# Patient Record
Sex: MALE | Race: WHITE | NOT HISPANIC OR LATINO | Employment: OTHER | URBAN - METROPOLITAN AREA
[De-identification: names, ages, dates, MRNs, and addresses within clinical notes are randomized per-mention and may not be internally consistent; named-entity substitution may affect disease eponyms.]

---

## 2021-01-20 DIAGNOSIS — Z23 ENCOUNTER FOR IMMUNIZATION: ICD-10-CM

## 2021-02-08 ENCOUNTER — IMMUNIZATIONS (OUTPATIENT)
Dept: FAMILY MEDICINE CLINIC | Facility: HOSPITAL | Age: 81
End: 2021-02-08

## 2021-02-08 DIAGNOSIS — Z23 ENCOUNTER FOR IMMUNIZATION: Primary | ICD-10-CM

## 2021-02-08 PROCEDURE — 0011A SARS-COV-2 / COVID-19 MRNA VACCINE (MODERNA) 100 MCG: CPT

## 2021-02-08 PROCEDURE — 91301 SARS-COV-2 / COVID-19 MRNA VACCINE (MODERNA) 100 MCG: CPT

## 2021-03-09 ENCOUNTER — IMMUNIZATIONS (OUTPATIENT)
Dept: FAMILY MEDICINE CLINIC | Facility: HOSPITAL | Age: 81
End: 2021-03-09

## 2021-03-09 DIAGNOSIS — Z23 ENCOUNTER FOR IMMUNIZATION: Primary | ICD-10-CM

## 2021-03-09 PROCEDURE — 0012A SARS-COV-2 / COVID-19 MRNA VACCINE (MODERNA) 100 MCG: CPT

## 2021-03-09 PROCEDURE — 91301 SARS-COV-2 / COVID-19 MRNA VACCINE (MODERNA) 100 MCG: CPT

## 2021-07-20 ENCOUNTER — HOSPITAL ENCOUNTER (EMERGENCY)
Facility: HOSPITAL | Age: 81
Discharge: HOME | End: 2021-07-20
Attending: EMERGENCY MEDICINE | Admitting: EMERGENCY MEDICINE
Payer: COMMERCIAL

## 2021-07-20 VITALS
TEMPERATURE: 97.1 F | WEIGHT: 175 LBS | HEART RATE: 83 BPM | BODY MASS INDEX: 23.7 KG/M2 | DIASTOLIC BLOOD PRESSURE: 86 MMHG | OXYGEN SATURATION: 97 % | HEIGHT: 72 IN | RESPIRATION RATE: 18 BRPM | SYSTOLIC BLOOD PRESSURE: 186 MMHG

## 2021-07-20 DIAGNOSIS — R21 RASH: Primary | ICD-10-CM

## 2021-07-20 DIAGNOSIS — R20.0 NUMBNESS OF TOES: ICD-10-CM

## 2021-07-20 LAB
GLUCOSE SERPL-MCNC: 91 MG/DL (ref 65–140)
VIT B12 SERPL-MCNC: 109 PG/ML (ref 100–900)

## 2021-07-20 PROCEDURE — 99282 EMERGENCY DEPT VISIT SF MDM: CPT | Performed by: PHYSICIAN ASSISTANT

## 2021-07-20 PROCEDURE — 82948 REAGENT STRIP/BLOOD GLUCOSE: CPT

## 2021-07-20 PROCEDURE — 36415 COLL VENOUS BLD VENIPUNCTURE: CPT | Performed by: PHYSICIAN ASSISTANT

## 2021-07-20 PROCEDURE — 82607 VITAMIN B-12: CPT | Performed by: PHYSICIAN ASSISTANT

## 2021-07-20 PROCEDURE — 83918 ORGANIC ACIDS TOTAL QUANT: CPT | Performed by: PHYSICIAN ASSISTANT

## 2021-07-20 PROCEDURE — 86617 LYME DISEASE ANTIBODY: CPT | Performed by: PHYSICIAN ASSISTANT

## 2021-07-20 PROCEDURE — 86618 LYME DISEASE ANTIBODY: CPT | Performed by: PHYSICIAN ASSISTANT

## 2021-07-20 PROCEDURE — 99283 EMERGENCY DEPT VISIT LOW MDM: CPT

## 2021-07-20 RX ORDER — TAMSULOSIN HYDROCHLORIDE 0.4 MG/1
0.4 CAPSULE ORAL
COMMUNITY

## 2021-07-20 NOTE — ED PROVIDER NOTES
History  Chief Complaint   Patient presents with    Rash     pt reports a rash on his legs and arms that he noticed about 3 weeks ago  denies any irritation or itchiness  states that he thinks he might have lyme's disease, as he found a tick on him about a month ago  denies any other symptoms  78 yo male w/ hx of HTN and PPM presents to the Emergency Department for evaluation of "rash" to the legs and arms intermittently x 3 weeks  Had removed a tick from his body prior to onset of symptoms, states he thought it was a skin tag and left it on for >2 days  No fevers, chills, sweats, N/V/D  He also reports ongoing toe and finger paresthesias  Prior to Admission Medications   Prescriptions Last Dose Informant Patient Reported? Taking?   tamsulosin (Flomax) 0 4 mg   Yes Yes   Sig: Take 0 4 mg by mouth daily with dinner      Facility-Administered Medications: None       Past Medical History:   Diagnosis Date    Hypertension        Past Surgical History:   Procedure Laterality Date    CARDIAC PACEMAKER PLACEMENT         History reviewed  No pertinent family history  I have reviewed and agree with the history as documented  E-Cigarette/Vaping     E-Cigarette/Vaping Substances     Social History     Tobacco Use    Smoking status: Never Smoker    Smokeless tobacco: Never Used   Substance Use Topics    Alcohol use: Yes     Comment: socially    Drug use: Never       Review of Systems   Constitutional: Negative for chills, diaphoresis and fever  Eyes: Negative for visual disturbance  Respiratory: Negative for cough and shortness of breath  Cardiovascular: Negative for chest pain and palpitations  Gastrointestinal: Negative for abdominal pain, diarrhea, nausea and vomiting  Genitourinary: Negative for dysuria, flank pain and frequency  Musculoskeletal: Negative for arthralgias and myalgias  Skin: Positive for color change  Negative for rash and wound     Allergic/Immunologic: Negative for immunocompromised state  Neurological: Positive for numbness  Negative for dizziness and light-headedness  Hematological: Does not bruise/bleed easily  Psychiatric/Behavioral: Negative for confusion  The patient is not nervous/anxious  Physical Exam  Physical Exam  Vitals and nursing note reviewed  Constitutional:       Appearance: He is well-developed  HENT:      Head: Normocephalic and atraumatic  Mouth/Throat:      Mouth: Mucous membranes are moist    Eyes:      Conjunctiva/sclera: Conjunctivae normal    Cardiovascular:      Rate and Rhythm: Normal rate and regular rhythm  Heart sounds: No murmur heard  Pulmonary:      Effort: Pulmonary effort is normal  No respiratory distress  Breath sounds: Normal breath sounds  Abdominal:      Palpations: Abdomen is soft  Tenderness: There is no abdominal tenderness  Musculoskeletal:      Cervical back: Neck supple  Skin:     General: Skin is warm and dry  Capillary Refill: Capillary refill takes less than 2 seconds  Comments: No obvious skin discolorations, lesions or rashes   Neurological:      General: No focal deficit present  Mental Status: He is alert        Comments: Normal digital sensation throughout   Psychiatric:         Mood and Affect: Mood normal          Behavior: Behavior normal          Vital Signs  ED Triage Vitals   Temperature Pulse Respirations Blood Pressure SpO2   07/20/21 1120 07/20/21 1121 07/20/21 1121 07/20/21 1121 07/20/21 1121   (!) 97 1 °F (36 2 °C) 83 18 (!) 186/86 97 %      Temp Source Heart Rate Source Patient Position - Orthostatic VS BP Location FiO2 (%)   07/20/21 1120 07/20/21 1121 -- -- --   Temporal Monitor         Pain Score       --                  Vitals:    07/20/21 1121   BP: (!) 186/86   Pulse: 83         Visual Acuity      ED Medications  Medications - No data to display    Diagnostic Studies  Results Reviewed     Procedure Component Value Units Date/Time Methylmalonic acid, serum [[id number]] Collected: 07/20/21 1208    Lab Status: In process Specimen: Blood from Arm, Right Updated: 07/20/21 1212    Lyme Antibody Profile with reflex to [facility] [[id number]] Collected: 07/20/21 1208    Lab Status: In process Specimen: Blood from Arm, Right Updated: 07/20/21 1212    Vitamin B12 [[id number]] Collected: 07/20/21 1208    Lab Status: In process Specimen: Blood from Arm, Right Updated: 07/20/21 1212    Fingerstick Glucose (POCT) [[id number]]  (Normal) Collected: 07/20/21 1200    Lab Status: Final result Updated: 07/20/21 1201     POC Glucose 91 mg/dl                  No orders to display              Procedures  Procedures         ED Course                                           MDM  Number of Diagnoses or Management Options  Numbness of toes: new and requires workup  Rash: new and requires workup  Diagnosis management comments: Will send lyme titer given preceding tick bite  Also send for B12/MMA given digital paresthesia, advised pt to f/u with PCP for these results       Amount and/or Complexity of Data Reviewed  Tests in the medicine section of CPT®: ordered and reviewed  Review and summarize past medical records: yes        Disposition  Final diagnoses:   Rash   Numbness of toes     Time reflects when diagnosis was documented in both MDM as applicable and the Disposition within this note     Time User Action Codes Description Comment    7/20/2021 11:46 AM Maria Elena Leos Add [R21] Rash     7/20/2021 11:48 AM Maria Elena Leos Add [R20 0] Numbness of toes       ED Disposition     ED Disposition Condition Date/Time Comment    Discharge Stable Tue Jul 20, 2021 11:46 AM Veronica Gut discharge to home/self care              Follow-up Information     Follow up With Specialties Details Why Contact Info    Everette Gant MD Family Medicine   [address]  [phone]            Discharge Medication List as of 7/20/2021 11:48 AM      CONTINUE these medications which have NOT CHANGED    Details   tamsulosin (Flomax) 0 4 mg Take 0 4 mg by mouth daily with dinner, Historical Med           No discharge procedures on file      PDMP Review     None          ED Provider  Electronically Signed by           Shahnaz Schroeder PA-C  07/20/21 4121

## 2021-07-22 LAB — B BURGDOR IGG+IGM SER-ACNC: 539

## 2021-07-23 LAB
B BURGDOR IGG PATRN SER IB-IMP: POSITIVE
B BURGDOR IGM PATRN SER IB-IMP: NEGATIVE
B BURGDOR18KD IGG SER QL IB: PRESENT
B BURGDOR23KD IGG SER QL IB: PRESENT
B BURGDOR23KD IGM SER QL IB: PRESENT
B BURGDOR28KD IGG SER QL IB: PRESENT
B BURGDOR30KD IGG SER QL IB: PRESENT
B BURGDOR39KD IGG SER QL IB: PRESENT
B BURGDOR39KD IGM SER QL IB: ABNORMAL
B BURGDOR41KD IGG SER QL IB: PRESENT
B BURGDOR41KD IGM SER QL IB: ABNORMAL
B BURGDOR45KD IGG SER QL IB: ABNORMAL
B BURGDOR58KD IGG SER QL IB: PRESENT
B BURGDOR66KD IGG SER QL IB: PRESENT
B BURGDOR93KD IGG SER QL IB: PRESENT
METHYLMALONATE SERPL-SCNC: 315 NMOL/L (ref 0–378)
SL AMB DISCLAIMER: NORMAL

## 2021-07-26 ENCOUNTER — TELEPHONE (OUTPATIENT)
Dept: EMERGENCY DEPT | Facility: HOSPITAL | Age: 81
End: 2021-07-26

## 2021-07-26 DIAGNOSIS — A69.20 LYME DISEASE: Primary | ICD-10-CM

## 2021-07-26 RX ORDER — DOXYCYCLINE HYCLATE 100 MG/1
100 CAPSULE ORAL 2 TIMES DAILY
Qty: 42 CAPSULE | Refills: 0 | Status: SHIPPED | OUTPATIENT
Start: 2021-07-26 | End: 2021-08-16

## 2023-05-04 ENCOUNTER — OFFICE VISIT (OUTPATIENT)
Dept: CARDIOLOGY CLINIC | Facility: CLINIC | Age: 83
End: 2023-05-04

## 2023-05-04 VITALS
HEART RATE: 64 BPM | SYSTOLIC BLOOD PRESSURE: 142 MMHG | BODY MASS INDEX: 24.42 KG/M2 | WEIGHT: 174.4 LBS | HEIGHT: 71 IN | DIASTOLIC BLOOD PRESSURE: 60 MMHG

## 2023-05-04 DIAGNOSIS — I49.5 SICK SINUS SYNDROME (HCC): ICD-10-CM

## 2023-05-04 DIAGNOSIS — I10 PRIMARY HYPERTENSION: ICD-10-CM

## 2023-05-04 DIAGNOSIS — I48.91 ATRIAL FIBRILLATION, UNSPECIFIED TYPE (HCC): Primary | ICD-10-CM

## 2023-05-04 DIAGNOSIS — I71.21 ANEURYSM OF ASCENDING AORTA WITHOUT RUPTURE (HCC): ICD-10-CM

## 2023-05-04 PROBLEM — Z95.0 PACEMAKER: Status: ACTIVE | Noted: 2023-05-04

## 2023-05-04 RX ORDER — APIXABAN 5 MG/1
5 TABLET, FILM COATED ORAL 2 TIMES DAILY
COMMUNITY
Start: 2023-04-21 | End: 2023-05-04

## 2023-05-04 RX ORDER — AMLODIPINE BESYLATE 10 MG/1
10 TABLET ORAL DAILY
Qty: 90 TABLET | Refills: 3 | Status: SHIPPED | OUTPATIENT
Start: 2023-05-04

## 2023-05-04 RX ORDER — METOPROLOL SUCCINATE 50 MG/1
50 TABLET, EXTENDED RELEASE ORAL DAILY
Qty: 90 TABLET | Refills: 3 | Status: SHIPPED | OUTPATIENT
Start: 2023-05-04

## 2023-05-04 RX ORDER — LISINOPRIL 40 MG/1
TABLET ORAL
COMMUNITY
End: 2023-05-04 | Stop reason: SDUPTHER

## 2023-05-04 RX ORDER — LISINOPRIL 20 MG/1
20 TABLET ORAL DAILY
Qty: 90 TABLET | Refills: 3 | Status: SHIPPED | OUTPATIENT
Start: 2023-05-04

## 2023-05-04 RX ORDER — AMLODIPINE BESYLATE 10 MG/1
10 TABLET ORAL DAILY
COMMUNITY
Start: 2023-04-13 | End: 2023-05-04 | Stop reason: SDUPTHER

## 2023-05-04 NOTE — PROGRESS NOTES
[address] Cardiology  Office Consultation  Willard Whatley 81 y o  male MRN: [id number]        Chief Complaint    Chief Complaint   Patient presents with    New Patient Visit     Est  Care     Edema     Right foot-mild       Referring Provider: Self, Referral    Impression & Plan:    1  Atrial fibrillation, unspecified type [facility])  He is presently atrial paced  Continue metoprolol XL 50 mg daily  Continue apixaban 5 mg twice daily for thromboembolic prophylaxis  - POCT ECG  - apixaban (Eliquis) 5 mg; Take 1 tablet (5 mg total) by mouth 2 (two) times a day  Dispense: 90 tablet; Refill: 3  - metoprolol succinate (TOPROL-XL) 50 mg 24 hr tablet; Take 1 tablet (50 mg total) by mouth daily  Dispense: 90 tablet; Refill: 3  - Lipid Panel with Direct LDL reflex; Future    2  Aneurysm of ascending aorta without rupture (HCC)  We will continue to monitor with a noncontrast CT after next visit  3  Primary hypertension  Controlled on current therapy  Continue amlodipine 10 mg daily and metoprolol XL 50 mg daily and lisinopril 20 mg daily  - lisinopril (ZESTRIL) 20 mg tablet; Take 1 tablet (20 mg total) by mouth daily  Dispense: 90 tablet; Refill: 3  - amLODIPine (NORVASC) 10 mg tablet; Take 1 tablet (10 mg total) by mouth daily  Dispense: 90 tablet; Refill: 3  - metoprolol succinate (TOPROL-XL) 50 mg 24 hr tablet; Take 1 tablet (50 mg total) by mouth daily  Dispense: 90 tablet; Refill: 3  - Lipid Panel with Direct LDL reflex; Future    4  Sick sinus syndrome ([address] )  With pacemaker in place  We will enroll him in our device clinic  We will see Willard Whatley back in 12 months for routine follow-up  HPI: Willard Whatley is a 81y o  year old male with PAF, SSS s/p PPM, HTN, presenting today to establish care with cardiology locally  He was previously seen by [facility] but no longer wants to make the commute  He has no cardiac complaints         Cardiac testing:     He had an echocardiogram at [facility] 10/14/2022 which showed normal LV size, wall thickness, systolic function  LVEF 60 to 65%  Indeterminate diastolic function  Mild RV dilation with normal systolic function  Moderately dilated left atrium  No significant valvular disease  Mild dilation of the aortic root and ascending aorta (4 0 cm and 4 1 cm, respectively)      EKG reviewed personally:   EKG in the office 5/4/2023 shows atrial paced rhythm, 64 bpm, normal axis,  ms and incomplete LBBB, nonspecific ST changes  Abnormal study  Relevant Laboratory Studies:  (Laboratory studies personally reviewed)      Review of Systems   Constitutional: Negative for activity change and unexpected weight change  HENT: Negative for facial swelling  Eyes: Negative for visual disturbance  Respiratory: Negative for cough and chest tightness  Cardiovascular: Negative for chest pain  Gastrointestinal: Negative for blood in stool  Musculoskeletal: Negative for myalgias  Skin: Negative for pallor  Allergic/Immunologic: Negative for immunocompromised state  Neurological: Negative for syncope and light-headedness  Psychiatric/Behavioral: Negative for agitation and hallucinations  Past Medical History:   Diagnosis Date    Hypertension      Past Surgical History:   Procedure Laterality Date    CARDIAC PACEMAKER PLACEMENT       Social History     Substance and Sexual Activity   Alcohol Use Yes    Comment: socially     Social History     Substance and Sexual Activity   Drug Use Never     Social History     Tobacco Use   Smoking Status Never   Smokeless Tobacco Never     No family history on file  Allergies:  No Known Allergies    Medications (as of START of this encounter):    Outpatient Medications Prior to Visit   Medication Sig Dispense Refill    amLODIPine (NORVASC) 10 mg tablet Take 10 mg by mouth daily      Eliquis 5 MG 5 mg 2 (two) times a day      lisinopril (ZESTRIL) 40 mg tablet       "METOPROLOL-HYDROCHLOROTHIAZIDE PO 50 mg daily      tamsulosin (FLOMAX) 0 4 mg Take 0 4 mg by mouth daily with dinner       No facility-administered medications prior to visit  Vitals:    05/04/23 1008   BP: 142/60   Pulse: 64     Weight (last 2 days)     Date/Time Weight    05/04/23 1008 79 1 (174 4)          General: Camilo Masterson is a well appearing elderly male, in no acute distress, sitting comfortably  HEENT: moist mucous membranes, EOMI  Neck:  No JVD, supple, trachea midline   Cardiovascular: unremarkable S1/S2, regular rate and rhythm, no murmurs, rubs or gallops   Pulmonary: normal respiratory effort, CTAB   Abdomen: soft and nondistended  Extremities: No lower extremity edema  Warm and well perfused extremities  Neuro: no focal motor deficits, AAOx3 (person, place, time)  Psych: Normal mood and affect, cooperative        Time Spent:  Total time (face-to-face and non-face-to-face) spent on today's visit was 40 minutes  This includes preparation for the visits (i e  reviewing test results), performance of a medically appropriate history and examination, and orders for medications, tests or other procedures  This time is exclusive of procedures performed and time spent teaching  Lelia Dickson MD    This note was completed in part utilizing [facility] [employer] recognition software  Grammatical errors, random word insertion, spelling mistakes, occasional wrong word or \"sound-alike\" substitutions and incomplete sentences may be an occasional consequence of the system secondary to software limitations, ambient noise and hardware issues  At the time of dictation, efforts were made to edit, clarify and /or correct errors  Please read the chart carefully and recognize, using context, where substitutions have occurred    If you have any questions or concerns about the context, text or information contained within the body of this dictation, please contact myself, the provider, for further " clarification

## 2023-05-23 ENCOUNTER — TELEPHONE (OUTPATIENT)
Dept: CARDIOLOGY CLINIC | Facility: CLINIC | Age: 83
End: 2023-05-23

## 2023-05-23 NOTE — TELEPHONE ENCOUNTER
P/C left a message on nurse line, would like to speak to a nurse     Called pt and left a message to call office

## 2023-07-31 ENCOUNTER — APPOINTMENT (OUTPATIENT)
Dept: LAB | Facility: CLINIC | Age: 83
End: 2023-07-31
Payer: COMMERCIAL

## 2023-07-31 DIAGNOSIS — I10 PRIMARY HYPERTENSION: ICD-10-CM

## 2023-07-31 DIAGNOSIS — I48.91 ATRIAL FIBRILLATION, UNSPECIFIED TYPE (HCC): ICD-10-CM

## 2023-07-31 LAB
CHOLEST SERPL-MCNC: 144 MG/DL
HDLC SERPL-MCNC: 44 MG/DL
LDLC SERPL CALC-MCNC: 80 MG/DL (ref 0–100)
TRIGL SERPL-MCNC: 98 MG/DL

## 2023-07-31 PROCEDURE — 36415 COLL VENOUS BLD VENIPUNCTURE: CPT

## 2023-07-31 PROCEDURE — 80061 LIPID PANEL: CPT

## 2023-10-11 ENCOUNTER — APPOINTMENT (EMERGENCY)
Dept: CT IMAGING | Facility: HOSPITAL | Age: 83
End: 2023-10-11
Payer: COMMERCIAL

## 2023-10-11 ENCOUNTER — APPOINTMENT (EMERGENCY)
Dept: RADIOLOGY | Facility: HOSPITAL | Age: 83
End: 2023-10-11
Payer: COMMERCIAL

## 2023-10-11 ENCOUNTER — HOSPITAL ENCOUNTER (EMERGENCY)
Facility: HOSPITAL | Age: 83
Discharge: HOME | End: 2023-10-11
Attending: EMERGENCY MEDICINE
Payer: COMMERCIAL

## 2023-10-11 VITALS
HEART RATE: 60 BPM | HEIGHT: 71 IN | RESPIRATION RATE: 17 BRPM | TEMPERATURE: 97.5 F | OXYGEN SATURATION: 97 % | BODY MASS INDEX: 24.32 KG/M2 | SYSTOLIC BLOOD PRESSURE: 122 MMHG | DIASTOLIC BLOOD PRESSURE: 58 MMHG
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
## 2023-10-11 DIAGNOSIS — M50.30 DEGENERATIVE DISC DISEASE, CERVICAL: ICD-10-CM

## 2023-10-11 DIAGNOSIS — E27.8 ADRENAL NODULE (HCC): ICD-10-CM

## 2023-10-11 DIAGNOSIS — E04.2 MULTIPLE THYROID NODULES: ICD-10-CM

## 2023-10-11 DIAGNOSIS — I72.2 RENAL ARTERY ANEURYSM (HCC): ICD-10-CM

## 2023-10-11 LAB
2HR DELTA HS TROPONIN: 0 NG/L
ABO GROUP BLD: NORMAL
ALBUMIN SERPL BCP-MCNC: 3.8 G/DL (ref 3.5–5)
ALP SERPL-CCNC: 54 U/L (ref 34–104)
ALT SERPL W P-5'-P-CCNC: 15 U/L (ref 7–52)
ANION GAP SERPL CALCULATED.3IONS-SCNC: 3 MMOL/L
AST SERPL W P-5'-P-CCNC: 17 U/L (ref 13–39)
ATRIAL RATE: 64 BPM
BASOPHILS # BLD AUTO: 0.04 THOUSANDS/ÂΜL (ref 0–0.1)
BASOPHILS NFR BLD AUTO: 1 % (ref 0–1)
BILIRUB SERPL-MCNC: 0.69 MG/DL (ref 0.2–1)
BLD GP AB SCN SERPL QL: NEGATIVE
BUN SERPL-MCNC: 12 MG/DL (ref 5–25)
CALCIUM SERPL-MCNC: 8.8 MG/DL (ref 8.4–10.2)
CARDIAC TROPONIN I PNL SERPL HS: 6 NG/L
CARDIAC TROPONIN I PNL SERPL HS: 6 NG/L
CHLORIDE SERPL-SCNC: 107 MMOL/L (ref 96–108)
CO2 SERPL-SCNC: 27 MMOL/L (ref 21–32)
CREAT SERPL-MCNC: 1.11 MG/DL (ref 0.6–1.3)
EOSINOPHIL # BLD AUTO: 0.09 THOUSAND/ÂΜL (ref 0–0.61)
EOSINOPHIL NFR BLD AUTO: 2 % (ref 0–6)
ERYTHROCYTE [DISTWIDTH] IN BLOOD BY AUTOMATED COUNT: 13.9 % (ref 11.6–15.1)
GFR SERPL CREATININE-BSD FRML MDRD: 61 ML/MIN/1.73SQ M
GLUCOSE SERPL-MCNC: 108 MG/DL (ref 65–140)
HCT VFR BLD AUTO: 39.5 % (ref 36.5–49.3)
HGB BLD-MCNC: 13.4 G/DL (ref 12–17)
IMM GRANULOCYTES # BLD AUTO: 0.03 THOUSAND/UL (ref 0–0.2)
IMM GRANULOCYTES NFR BLD AUTO: 1 % (ref 0–2)
LYMPHOCYTES # BLD AUTO: 1.05 THOUSANDS/ÂΜL (ref 0.6–4.47)
LYMPHOCYTES NFR BLD AUTO: 18 % (ref 14–44)
MAGNESIUM SERPL-MCNC: 1.9 MG/DL (ref 1.9–2.7)
MCH RBC QN AUTO: 31.2 PG (ref 26.8–34.3)
MCHC RBC AUTO-ENTMCNC: 33.9 G/DL (ref 31.4–37.4)
MCV RBC AUTO: 92 FL (ref 82–98)
MONOCYTES # BLD AUTO: 0.56 THOUSAND/ÂΜL (ref 0.17–1.22)
MONOCYTES NFR BLD AUTO: 10 % (ref 4–12)
NEUTROPHILS # BLD AUTO: 4.01 THOUSANDS/ÂΜL (ref 1.85–7.62)
NEUTS SEG NFR BLD AUTO: 68 % (ref 43–75)
NRBC BLD AUTO-RTO: 0 /100 WBCS
PLATELET # BLD AUTO: 153 THOUSANDS/UL (ref 149–390)
PMV BLD AUTO: 9.9 FL (ref 8.9–12.7)
POTASSIUM SERPL-SCNC: 4.2 MMOL/L (ref 3.5–5.3)
PR INTERVAL: 196 MS
PROT SERPL-MCNC: 6.1 G/DL (ref 6.4–8.4)
QRS AXIS: 81 DEGREES
QRSD INTERVAL: 114 MS
QT INTERVAL: 438 MS
QTC INTERVAL: 451 MS
RBC # BLD AUTO: 4.3 MILLION/UL (ref 3.88–5.62)
RH BLD: POSITIVE
SODIUM SERPL-SCNC: 137 MMOL/L (ref 135–147)
SPECIMEN EXPIRATION DATE: NORMAL
T WAVE AXIS: 86 DEGREES
VENTRICULAR RATE: 64 BPM
WBC # BLD AUTO: 5.78 THOUSAND/UL (ref 4.31–10.16)

## 2023-10-11 PROCEDURE — 70450 CT HEAD/BRAIN W/O DYE: CPT

## 2023-10-11 PROCEDURE — 72170 X-RAY EXAM OF PELVIS: CPT

## 2023-10-11 PROCEDURE — 86850 RBC ANTIBODY SCREEN: CPT

## 2023-10-11 PROCEDURE — 36415 COLL VENOUS BLD VENIPUNCTURE: CPT

## 2023-10-11 PROCEDURE — 85025 COMPLETE CBC W/AUTO DIFF WBC: CPT

## 2023-10-11 PROCEDURE — 83735 ASSAY OF MAGNESIUM: CPT

## 2023-10-11 PROCEDURE — 93005 ELECTROCARDIOGRAM TRACING: CPT

## 2023-10-11 PROCEDURE — 72125 CT NECK SPINE W/O DYE: CPT

## 2023-10-11 PROCEDURE — 99285 EMERGENCY DEPT VISIT HI MDM: CPT

## 2023-10-11 PROCEDURE — 93010 ELECTROCARDIOGRAM REPORT: CPT | Performed by: INTERNAL MEDICINE

## 2023-10-11 PROCEDURE — 84484 ASSAY OF TROPONIN QUANT: CPT

## 2023-10-11 PROCEDURE — 80053 COMPREHEN METABOLIC PANEL: CPT

## 2023-10-11 PROCEDURE — 71045 X-RAY EXAM CHEST 1 VIEW: CPT

## 2023-10-11 PROCEDURE — 71260 CT THORAX DX C+: CPT

## 2023-10-11 PROCEDURE — 74177 CT ABD & PELVIS W/CONTRAST: CPT

## 2023-10-11 PROCEDURE — 86900 BLOOD TYPING SEROLOGIC ABO: CPT

## 2023-10-11 PROCEDURE — 86901 BLOOD TYPING SEROLOGIC RH(D): CPT

## 2023-10-11 RX ADMIN — IOHEXOL 100 ML: 350 INJECTION, SOLUTION INTRAVENOUS at 11:09

## 2023-10-11 NOTE — ED PROVIDER NOTES
Emergency Department Trauma Note  Prudencio Berman 81 y.o. male MRN: [id number]  Unit/Bed#: ED OVR 02/OVR 02 Encounter: [id number]      Trauma Alert: Trauma Acuity: Trauma Evaluation  Model of Arrival: Mode of Arrival: Direct from scene via    Trauma Team: Current Providers  Attending Provider: Laith Mullen DO  Physician Assistant: Newton Champion, PAAbrilC  Registered Nurse: Zaria Gomez RN  Consultants:     None      History of Present Illness     Chief Complaint:   Chief Complaint   Patient presents with    Fall     Pt reports that he fell striking head yesterday. Pt on Eliquis. +LOC     HPI:  Prudencio Berman is a 81 y.o. male who presents with headache and back pain after fall yesterday. Mechanism:Details of Incident: patient reports feeling dizziness while walking in to kitchen, reports passing out and waking up on floor Injury Date: 10/10/23 Injury Time: 0900 Injury Occurence Location - [address]    This is an 82 y/o male with PMH a fib on eliquis, sick sinus syndrome with pacemaker, HTN who presents to the ER with back pain, headache after fall yesterday around 1030 AM. Patient reports he was walking up the stairs after working outside and felt lightheaded and then passed out on the hardwood floor when he got to the top of the stairs. It was an unwitnessed fall and he reports he did lose consciousness. Since then has had some right lower back pain and headache. He also reports he has had this intermittent lightheadedness for one month before this fall yesterday. he denies any chest pain, shortness of breath, difficulty breathing, abdominal pain, nausea, vomiting, blood in urine, fevers. No sick contacts, recent travel, recent hospitalizations or surgeries. History provided by:  Patient   used: No      Review of Systems   Constitutional:  Negative for chills and fever. Respiratory:  Negative for shortness of breath.     Cardiovascular:  Negative for chest pain, palpitations and leg swelling. Gastrointestinal:  Negative for abdominal pain, nausea and vomiting. Genitourinary:  Negative for hematuria. Musculoskeletal:  Positive for back pain. Negative for neck pain. Skin:  Negative for color change. Neurological:  Positive for syncope, light-headedness and headaches. Negative for dizziness, weakness and numbness. Psychiatric/Behavioral:  Negative for behavioral problems, confusion and sleep disturbance. All other systems reviewed and are negative. Historical Information     Immunizations:   Immunization History   Administered Date(s) Administered    COVID-19 MODERNA VACC 0.5 ML IM 02/08/2021, 03/09/2021, 11/01/2021       Past Medical History:   Diagnosis Date    A-fib ([address])     Hypertension      No family history on file. Past Surgical History:   Procedure Laterality Date    CARDIAC PACEMAKER PLACEMENT       Social History     Tobacco Use    Smoking status: Never    Smokeless tobacco: Never   Vaping Use    Vaping Use: Never used   Substance Use Topics    Alcohol use: Yes     Comment: socially    Drug use: Never     E-Cigarette/Vaping    E-Cigarette Use Never User      E-Cigarette/Vaping Substances       Family History: non-contributory    Meds/Allergies   Prior to Admission Medications   Prescriptions Last Dose Informant Patient Reported? Taking?    amLODIPine (NORVASC) 10 mg tablet   No No   Sig: Take 1 tablet (10 mg total) by mouth daily   apixaban (Eliquis) 5 mg   No No   Sig: Take 1 tablet (5 mg total) by mouth 2 (two) times a day   lisinopril (ZESTRIL) 20 mg tablet   No No   Sig: Take 1 tablet (20 mg total) by mouth daily   metoprolol succinate (TOPROL-XL) 50 mg 24 hr tablet   No No   Sig: Take 1 tablet (50 mg total) by mouth daily   tamsulosin (FLOMAX) 0.4 mg  Self Yes No   Sig: Take 0.4 mg by mouth daily with dinner      Facility-Administered Medications: None       No Known Allergies    PHYSICAL EXAM    PE limited by: nothing    Objective Vitals:   First set: Temperature: 97.5 °F (36.4 °C) (10/11/23 1033)  Pulse: 71 (Simultaneous filing. User may not have seen previous data.) (10/11/23 1033)  Respirations: 18 (10/11/23 1033)  Blood Pressure: 162/75 (10/11/23 1033)  SpO2: 99 % (Simultaneous filing. User may not have seen previous data.) (10/11/23 1033)    Primary Survey:   (A) Airway: patent  (B) Breathing: normal  (C) Circulation: Pulses:   normal  (D) Disabliity:  GCS Total:  15  (E) Expose:  Completed    Secondary Survey: (Click on Physical Exam tab above)  Physical Exam  Vitals and nursing note reviewed. Constitutional:       General: He is awake. Appearance: Normal appearance. He is well-developed. HENT:      Head: Normocephalic and atraumatic. Right Ear: External ear normal.      Left Ear: External ear normal.      Nose: Nose normal.   Eyes:      General: No scleral icterus. Extraocular Movements: Extraocular movements intact. Cardiovascular:      Rate and Rhythm: Normal rate and regular rhythm. Heart sounds: Normal heart sounds, S1 normal and S2 normal. No murmur heard. No gallop. Pulmonary:      Effort: Pulmonary effort is normal.      Breath sounds: Normal breath sounds. No wheezing, rhonchi or rales. Musculoskeletal:         General: Normal range of motion. Cervical back: Normal range of motion. Back:       Comments: Tenderness to palpation of ilac crest right lower back. No ecchymosis, step of or deformity noted. No midline c, t or l spine tenderness. No swelling, bruising, step offs or deformities of the spine noted. Pelvis stable   Skin:     General: Skin is warm and dry. Neurological:      General: No focal deficit present. Mental Status: He is alert and oriented to person, place, and time. GCS: GCS eye subscore is 4. GCS verbal subscore is 5. GCS motor subscore is 6. Cranial Nerves: No facial asymmetry. Sensory: Sensation is intact.       Motor: Motor function is intact. Gait: Gait is intact. Psychiatric:         Attention and Perception: Attention and perception normal.         Mood and Affect: Mood normal.         Behavior: Behavior normal. Behavior is cooperative. Cervical spine cleared by clinical criteria?  No (imaging required)      Invasive Devices       None                   Lab Results:   Results Reviewed       Procedure Component Value Units Date/Time    HS Troponin I 2hr [[id number]]  (Normal) Collected: 10/11/23 1234    Lab Status: Final result Specimen: Blood from Arm, Right Updated: 10/11/23 1306     hs TnI 2hr 6 ng/L      Delta 2hr hsTnI 0 ng/L     Magnesium [[id number]]  (Normal) Collected: 10/11/23 1043    Lab Status: Final result Specimen: Blood from Arm, Right Updated: 10/11/23 1302     Magnesium 1.9 mg/dL     HS Troponin I 4hr [[id number]]     Lab Status: No result Specimen: Blood     HS Troponin 0hr (reflex protocol) [[id number]]  (Normal) Collected: 10/11/23 1043    Lab Status: Final result Specimen: Blood from Arm, Right Updated: 10/11/23 1130     hs TnI 0hr 6 ng/L     Comprehensive metabolic panel [[id number]]  (Abnormal) Collected: 10/11/23 1043    Lab Status: Final result Specimen: Blood from Arm, Right Updated: 10/11/23 1124     Sodium 137 mmol/L      Potassium 4.2 mmol/L      Chloride 107 mmol/L      CO2 27 mmol/L      ANION GAP 3 mmol/L      BUN 12 mg/dL      Creatinine 1.11 mg/dL      Glucose 108 mg/dL      Calcium 8.8 mg/dL      AST 17 U/L      ALT 15 U/L      Alkaline Phosphatase 54 U/L      Total Protein 6.1 g/dL      Albumin 3.8 g/dL      Total Bilirubin 0.69 mg/dL      eGFR 61 ml/min/1.73sq m     Narrative:      [place] guidelines for Chronic Kidney Disease (CKD):     Stage 1 with normal or high GFR (GFR > 90 mL/min/1.73 square meters)    Stage 2 Mild CKD (GFR = 60-89 mL/min/1.73 square meters)    Stage 3A Moderate CKD (GFR = 45-59 mL/min/1.73 square meters)    Stage 3B Moderate CKD (GFR = 30-44 mL/min/1.73 square meters)    Stage 4 Severe CKD (GFR = 15-29 mL/min/1.73 square meters)    Stage 5 End Stage CKD (GFR <15 mL/min/1.73 square meters)  Note: GFR calculation is accurate only with a steady state creatinine    CBC and differential [[id number]] Collected: 10/11/23 1043    Lab Status: Final result Specimen: Blood from Arm, Right Updated: 10/11/23 1056     WBC 5.78 Thousand/uL      RBC 4.30 Million/uL      Hemoglobin 13.4 g/dL      Hematocrit 39.5 %      MCV 92 fL      MCH 31.2 pg      MCHC 33.9 g/dL      RDW 13.9 %      MPV 9.9 fL      Platelets 015 Thousands/uL      nRBC 0 /100 WBCs      Neutrophils Relative 68 %      Immat GRANS % 1 %      Lymphocytes Relative 18 %      Monocytes Relative 10 %      Eosinophils Relative 2 %      Basophils Relative 1 %      Neutrophils Absolute 4.01 Thousands/µL      Immature Grans Absolute 0.03 Thousand/uL      Lymphocytes Absolute 1.05 Thousands/µL      Monocytes Absolute 0.56 Thousand/µL      Eosinophils Absolute 0.09 Thousand/µL      Basophils Absolute 0.04 Thousands/µL                    Imaging Studies:   Direct to CT: Yes  TRAUMA - CT head wo contrast   Final Result by Brooklyn Kinney MD (10/11 1223)      No acute intracranial hemorrhage seen   No mass effect or midline shift seen                  Workstation performed: [id number]         TRAUMA - CT spine cervical wo contrast   Final Result by Brooklyn Kinney MD (10/11 1150)      No acute compression collapse of the vetebra      Moderate to severe central canal narrowing at C5-6 with a degenerative disc disease and broad-based ridging and disc and osteophyte complex with bilateral moderate foraminal narrowing      Incidentally detected 2.2 cm left thyroid nodule and 1.8 cm right thyroid nodule, meets the size criteria for further evaluation with ultrasound for incidentally detected thyroid nodules      The study was marked in EPIC for immediate notification.       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               Workstation performed: [id number]         XR Trauma pelvis ap only 1 or 2 vw   Final Result by Jair Koroma MD (10/11 1224)   No acute osseous abnormality. Workstation performed: [id number]         XR Trauma chest portable   Final Result by Jair Koroma MD (10/11 0484 31 [id number] 02)   No acute cardiopulmonary findings.                   Workstation performed: [id number]               Procedures  ECG 12 Lead Documentation Only    Date/Time: 10/11/2023 11:21 AM    Performed by: Patricia Miller PA-C  Authorized by: Patricia Miller PA-C    Indications / Diagnosis:  Dizziness/syncope  Patient location:  ED  Previous ECG:     Previous ECG:  Compared to current    Comparison ECG info:  Paced normal sinus rhythm with occasional PVCs when compared to EKG from 5/4/23 PVCs now presents otherwise no new changes    Similarity:  Changes noted  Interpretation:     Interpretation: normal    Rate:     ECG rate: 64    ECG rate assessment: normal    Rhythm:     Rhythm: sinus rhythm    Ectopy:     Ectopy: PVCs    ST segments:     ST segments:  Normal           ED Course           Medical Decision Making  80 y/o male here for fall after syncopal event, HA, dizziness  Differential diagnosis including but not limited to: ACS, arrythmias, pacemaker failure, anemia, dehydration, viral syndrome, less likely (azs patient is well appearing and has stable vital signs but will get imaging to r/o) intracranial hemorrhage, intra-abdominal bleeding, rib fracture, hip fracture, pneumo/hemothorax  Assessment: fall, with incidental findings on CT of: thyroid nodules, renal artery aneurysm, DDD, adrenal nodule  Plan:  no acute findings on labs or imaging. Heart score of 3. Supportive care with PCP follow up. Informed him of his incidental findings that require follow up imaging. He  was given strict return to ER precautions both verbally and in discharge papers. Patient verbalized understanding and agrees with plan. Amount and/or Complexity of Data Reviewed  Labs: ordered. Radiology: ordered. Risk  Prescription drug management. 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 QCLA] Fall, initial encounter     10/11/2023 12:13 PM Gladystine Dulce Add [E04.2] Multiple thyroid nodules     10/11/2023 12:14 PM Gladystine Dulce Add [M50.30] Degenerative disc disease, cervical     10/11/2023 12:29 PM Gladystine Dulce Add [I72.2] Renal artery aneurysm ([address])     10/11/2023 12:29 PM Gladystine Dulce Add [E27.8] Adrenal nodule [facility])           ED Disposition       ED Disposition Discharge    Condition   Stable    Date/Time   Wed Oct 11, 2023  1:10 PM    Comment   Apropose discharge to home/self care.                    Follow-up Information       Follow up With Specialties Details Why Contact Info Additional Information    Leonila Christie MD Family Medicine Schedule an appointment as soon as possible for a visit   [address] [facility]  [phone] [address] [facility] Vascular Surgery Schedule an appointment as soon as possible for a visit   [address] [address] [phone] [facility], [address] [place] [address], [place], [place], [address] [place] [address] Emergency Department Emergency Medicine Go to  if you develop chest pain, shortness of breath, difficulty breathing, intense headache, slurred speech, facial droop, difficulty walking, numbness, tingling, weakness of extremity, fevers [address] [facility] [address] [phone] [address] [place] [address] Emergency Department, [address] [facility], [address]          Discharge Medication List as of 10/11/2023  1:10 PM        CONTINUE these medications which have NOT CHANGED    Details   amLODIPine (NORVASC) 10 mg tablet Take 1 tablet (10 mg total) by mouth daily, Starting Thu 5/4/2023, Normal      apixaban (Eliquis) 5 mg Take 1 tablet (5 mg total) by mouth 2 (two) times a day, Starting Thu 5/4/2023, Normal      lisinopril (ZESTRIL) 20 mg tablet Take 1 tablet (20 mg total) by mouth daily, Starting Thu 5/4/2023, Normal      metoprolol succinate (TOPROL-XL) 50 mg 24 hr tablet Take 1 tablet (50 mg total) by mouth daily, Starting Thu 5/4/2023, Normal      tamsulosin (FLOMAX) 0.4 mg Take 0.4 mg by mouth daily with dinner, Historical Med               PDMP Review       None            ED Provider  Electronically Signed by           Stevie Salazar Zachary Catalan PA-C  10/11/23 9917

## 2023-10-11 NOTE — DISCHARGE INSTRUCTIONS
CT findings:   - Incidentally detected 2.2 cm left thyroid nodule and 1.8 cm right thyroid nodule, meets the size criteria for further evaluation with ultrasound for incidentally detected thyroid nodules.   - Incidentally noted is a 1.8 cm aneurysm in relation to the upper pole branch of the left renal artery, nonemergent vascular evaluation suggested  - 1.6 cm left adrenal nodule, indeterminate possibly adenoma if patient has no known oncological history, can be characterized with the nonemergent noncontrast CT/CT with adrenal protocol   You need to schedule an appointment with your PCP to schedule U/S, nonemergent CT  Follow up with vascular surgery for incidental finding of renal artery aneurysm  F/u with PCP for lightheadedness  Return to the ER if you develop chest pain, shortness of breath, difficulty breathing, intense headache, slurred speech, facial droop, difficulty walking, numbness, tingling, weakness of extremity, fevers

## 2024-03-27 ENCOUNTER — IN-CLINIC DEVICE VISIT (OUTPATIENT)
Dept: CARDIOLOGY CLINIC | Facility: CLINIC | Age: 84
End: 2024-03-27
Payer: COMMERCIAL

## 2024-03-27 DIAGNOSIS — Z95.0 PRESENCE OF CARDIAC PACEMAKER: Primary | ICD-10-CM

## 2024-03-27 PROCEDURE — 93280 PM DEVICE PROGR EVAL DUAL: CPT | Performed by: INTERNAL MEDICINE

## 2024-03-27 NOTE — PROGRESS NOTES
Results for orders placed or performed in visit on 03/27/24   Cardiac EP device report    Narrative    DEVICE INTERROGATED IN THE [place] OFFICE: PT NEW TO [facility]: BATTERY VOLTAGE ADEQUATE (8.4 YRS). AP: 99.4%. : 0.1% (MVP-ON). ALL LEAD PARAMETERS WITHIN NORMAL LIMITS. NO SIGNIFICANT HIGH RATE EPISODES AT/AF EPISODES & VT EPISODES PREVIOUSLY ADDRESSED W/ PRIOR CARDIOLOGIST~ AT/AF EPISODES W EGMS SHOWING AF, MAX DURATION 13 HRS). AF BURDEN: 0.5%. VT EPISODE SHOWING NSVT 10 BEATS @ 173 BPM. PT TAKES ELIQUIS, METOPROLOL SUCC. NO AVAIL EF IN EPIC. NO PROGRAMMING CHANGES MADE TO DEVICE PARAMETERS. NORMAL DEVICE FUNCTION. CH

## 2024-05-17 ENCOUNTER — OFFICE VISIT (OUTPATIENT)
Dept: CARDIOLOGY CLINIC | Facility: CLINIC | Age: 84
End: 2024-05-17
Payer: COMMERCIAL

## 2024-05-17 VITALS
DIASTOLIC BLOOD PRESSURE: 60 MMHG | HEIGHT: 71 IN | HEART RATE: 67 BPM | SYSTOLIC BLOOD PRESSURE: 120 MMHG | BODY MASS INDEX: 22.96 KG/M2 | WEIGHT: 164 LBS

## 2024-05-17 DIAGNOSIS — I71.21 ANEURYSM OF ASCENDING AORTA WITHOUT RUPTURE (HCC): ICD-10-CM

## 2024-05-17 DIAGNOSIS — I49.5 SICK SINUS SYNDROME (HCC): ICD-10-CM

## 2024-05-17 DIAGNOSIS — I48.0 PAROXYSMAL ATRIAL FIBRILLATION (HCC): Primary | ICD-10-CM

## 2024-05-17 DIAGNOSIS — R60.0 BILATERAL LEG EDEMA: ICD-10-CM

## 2024-05-17 PROCEDURE — 99214 OFFICE O/P EST MOD 30 MIN: CPT | Performed by: INTERNAL MEDICINE

## 2024-05-17 RX ORDER — FUROSEMIDE 40 MG/1
40 TABLET ORAL DAILY
COMMUNITY
Start: 2024-03-19

## 2024-05-17 NOTE — ASSESSMENT & PLAN NOTE
Unclear etiology, possible adverse effect of amlodipine   Stop amlodipine, lisinopril as patient has been hypotensive with addition of Lasix  No s/s of CHF --no dyspnea, orthopnea, PND  However we will check echo, BNP  Check urine study to evaluate for proteinuria  Check BMP as he has been on lasix therapy.  He reports minimal increase urine output.

## 2024-05-17 NOTE — PROGRESS NOTES
"Cardiology Outpatient Follow-Up Note - Daniel Martins 83 y.o. male MRN: [id number]      Assessment/Plan:    1. Paroxysmal atrial fibrillation (HCC)  Overview:  On rate control with metoprolol XL  Anticoagulated with Eliquis 5 mg BID  Device interrogation March 2024 showed 0.5% AF burden  Assessment & Plan:  Continue current medications.  2. Aneurysm of ascending aorta without rupture (HCC)  Overview:  CT on 10/11/23: 4.1 cm ascending aorta.  On anti-impulse therapy with beta-blocker.  Will need annual surveillance.  Assessment & Plan:  Next CT scan approximately October 2024  3. Sick sinus syndrome (HCC)  Overview:  [employer]tr[employer] PPM implanted Oct 2019  4. Bilateral leg edema  Assessment & Plan:  Unclear etiology, possible adverse effect of amlodipine   Stop amlodipine, lisinopril as patient has been hypotensive with addition of Lasix  No s/s of CHF --no dyspnea, orthopnea, PND  However we will check echo, BNP  Check urine study to evaluate for proteinuria  Check BMP as he has been on lasix therapy.  He reports minimal increase urine output.  Orders:  -     Echo complete w/ contrast if indicated; Future; Expected date: 05/17/2024  -     Albumin / creatinine urine ratio; Future  -     Albumin / creatinine urine ratio  -     Basic metabolic panel; Future  -     Basic metabolic panel  -     B-Type Natriuretic Peptide(BNP); Future  -     B-Type Natriuretic Peptide(BNP)        We will see Daniel Martins back in 1 month for follow-up.    Subjective:     HPI: Daniel Martins is a 83 y.o. year old male with PAF, TAA, SSS s/p PPM, presenting for follow-up with complaints of new LE edema.     He has been having a couple weeks of LE edema. He was evaluated by his PCP and was started on lasix 40 mg daily. He has been feeling lightheaded since starting the lasix. He states that while lightheaded he measured his BP and SBP was in 70s.   No dyspnea, orthopnea, or PND. \"Running up the steps doesn't bother me\". Denies chest pain, chest " "pressure.     EP device report 3/27/2024--AF 0.5%, NSVT 10 beats, no other significant high rate episodes.    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     1.6 cm left adrenal nodule, indeterminate possibly adenoma if patient has no known oncological history, can be characterized with the nonemergent noncontrast CT/CT with adrenal protocol     Mild nonspecific thickening of the jejunum probably due to redundant small bowel loops     Diffuse bladder wall thickening likely sequela of chronic bladder outlet obstruction         EKGs, personally reviewed:  N/a    Relevant Labs & Results:    CMP 10/11/23 - K 4.2, Cr 1.11  Lipid panel 7/31/23 - LDL 80 mg/dL   CBC 10/11/23 - Hgb 13.4 g/dL    ROS:  Review of Systems:  Review of Systems    Objective:     Vitals:   Vitals:    05/17/24 0939   BP: 120/60   BP Location: Left arm   Patient Position: Sitting   Cuff Size: Standard   Pulse: 67   Weight: 74.4 kg (164 lb)   Height: 5' 11\" (1.803 m)    Body surface area is 1.94 meters squared.  Wt Readings from Last 3 Encounters:   05/17/24 74.4 kg (164 lb)   05/04/23 79.1 kg (174 lb 6.4 oz)   07/20/21 79.4 kg (175 lb)       Physical Exam:  General: Daniel Martins is a well appearing male, in no acute distress, sitting comfortably  HEENT: moist mucous membranes, EOMI  Neck:  No JVD, supple, trachea midline  Cardiovascular: soft S1/S2, regular rate and rhythm, no murmurs, rubs or gallops  Pulmonary: normal respiratory effort, CTAB  Abdomen: soft and nondistended  Extremities: +2 " "pitting bilateral lower extremity edema.   Neuro: no focal motor deficits, AAOx3 (person, place, time)  Psych: Normal mood and affect, cooperative      Medications (at the START of this encounter):  Outpatient Medications Prior to Visit   Medication Sig Dispense Refill    apixaban (Eliquis) 5 mg Take 1 tablet (5 mg total) by mouth 2 (two) times a day 90 tablet 3    furosemide (LASIX) 40 mg tablet Take 40 mg by mouth daily      metoprolol succinate (TOPROL-XL) 50 mg 24 hr tablet Take 1 tablet (50 mg total) by mouth daily 90 tablet 3    tamsulosin (FLOMAX) 0.4 mg Take 0.4 mg by mouth daily with dinner      amLODIPine (NORVASC) 10 mg tablet Take 1 tablet (10 mg total) by mouth daily 90 tablet 3    lisinopril (ZESTRIL) 20 mg tablet Take 1 tablet (20 mg total) by mouth daily 90 tablet 3     No facility-administered medications prior to visit.                 Time Spent:  Total time (face-to-face and non-face-to-face) spent on today's visit was 25 minutes. This includes preparation for the visits (i.e. reviewing test results), performance of a medically appropriate history and examination, and orders for medications, tests or other procedures. This time is exclusive of procedures performed and time spent teaching.      This note was completed in part utilizing Dragon [employer] voice recognition software. Grammatical errors, random word insertion, spelling mistakes, occasional wrong word or \"sound-alike\" substitutions and incomplete sentences may be an occasional consequence of the system secondary to software limitations, ambient noise and hardware issues. At the time of dictation, efforts were made to edit, clarify and /or correct errors.  Please read the chart carefully and recognize, using context, where substitutions have occurred.  If you have any questions or concerns about the context, text or information contained within the body of this dictation, please contact myself, the provider, for further " clarification.

## 2024-05-21 LAB
BUN SERPL-MCNC: 16 MG/DL (ref 8–27)
BUN/CREAT SERPL: 15 (ref 10–24)
CALCIUM SERPL-MCNC: 7.8 MG/DL (ref 8.6–10.2)
CHLORIDE SERPL-SCNC: 104 MMOL/L (ref 96–106)
CO2 SERPL-SCNC: 24 MMOL/L (ref 20–29)
CREAT SERPL-MCNC: 1.09 MG/DL (ref 0.76–1.27)
EGFR: 68 ML/MIN/1.73
GLUCOSE SERPL-MCNC: 109 MG/DL (ref 70–99)
POTASSIUM SERPL-SCNC: 4.2 MMOL/L (ref 3.5–5.2)
SODIUM SERPL-SCNC: 138 MMOL/L (ref 134–144)

## 2024-05-22 LAB
ALBUMIN/CREAT UR: 3 MG/G CREAT (ref 0–29)
BNP SERPL-MCNC: 59.4 PG/ML (ref 0–100)
CREAT UR-MCNC: 118.8 MG/DL
MICROALBUMIN UR-MCNC: 3 UG/ML

## 2024-05-24 ENCOUNTER — HOSPITAL ENCOUNTER (EMERGENCY)
Facility: HOSPITAL | Age: 84
Discharge: HOME | End: 2024-05-24
Attending: EMERGENCY MEDICINE
Payer: COMMERCIAL

## 2024-05-24 VITALS
HEART RATE: 60 BPM | RESPIRATION RATE: 15 BRPM | TEMPERATURE: 98.1 F | SYSTOLIC BLOOD PRESSURE: 147 MMHG | DIASTOLIC BLOOD PRESSURE: 62 MMHG | WEIGHT: 155.4 LBS | BODY MASS INDEX: 21.67 KG/M2 | OXYGEN SATURATION: 96 %

## 2024-05-24 DIAGNOSIS — L03.90 CELLULITIS: Primary | ICD-10-CM

## 2024-05-24 LAB
ALBUMIN SERPL BCP-MCNC: 2.7 G/DL (ref 3.5–5)
ALP SERPL-CCNC: 45 U/L (ref 34–104)
ALT SERPL W P-5'-P-CCNC: 16 U/L (ref 7–52)
ANION GAP SERPL CALCULATED.3IONS-SCNC: 2 MMOL/L (ref 4–13)
AST SERPL W P-5'-P-CCNC: 17 U/L (ref 13–39)
BASOPHILS # BLD AUTO: 0.01 THOUSANDS/ÂΜL (ref 0–0.1)
BASOPHILS NFR BLD AUTO: 0 % (ref 0–1)
BILIRUB SERPL-MCNC: 0.83 MG/DL (ref 0.2–1)
BNP SERPL-MCNC: 79 PG/ML (ref 0–100)
BUN SERPL-MCNC: 26 MG/DL (ref 5–25)
CALCIUM ALBUM COR SERPL-MCNC: 8.7 MG/DL (ref 8.3–10.1)
CALCIUM SERPL-MCNC: 7.7 MG/DL (ref 8.4–10.2)
CHLORIDE SERPL-SCNC: 103 MMOL/L (ref 96–108)
CO2 SERPL-SCNC: 30 MMOL/L (ref 21–32)
CREAT SERPL-MCNC: 1.18 MG/DL (ref 0.6–1.3)
EOSINOPHIL # BLD AUTO: 0.03 THOUSAND/ÂΜL (ref 0–0.61)
EOSINOPHIL NFR BLD AUTO: 1 % (ref 0–6)
ERYTHROCYTE [DISTWIDTH] IN BLOOD BY AUTOMATED COUNT: 15 % (ref 11.6–15.1)
GFR SERPL CREATININE-BSD FRML MDRD: 57 ML/MIN/1.73SQ M
GLUCOSE SERPL-MCNC: 115 MG/DL (ref 65–140)
HCT VFR BLD AUTO: 35.3 % (ref 36.5–49.3)
HGB BLD-MCNC: 11.9 G/DL (ref 12–17)
IMM GRANULOCYTES # BLD AUTO: 0.04 THOUSAND/UL (ref 0–0.2)
IMM GRANULOCYTES NFR BLD AUTO: 1 % (ref 0–2)
LIPASE SERPL-CCNC: 46 U/L (ref 11–82)
LYMPHOCYTES # BLD AUTO: 0.61 THOUSANDS/ÂΜL (ref 0.6–4.47)
LYMPHOCYTES NFR BLD AUTO: 11 % (ref 14–44)
MCH RBC QN AUTO: 32.6 PG (ref 26.8–34.3)
MCHC RBC AUTO-ENTMCNC: 33.7 G/DL (ref 31.4–37.4)
MCV RBC AUTO: 97 FL (ref 82–98)
MONOCYTES # BLD AUTO: 0.58 THOUSAND/ÂΜL (ref 0.17–1.22)
MONOCYTES NFR BLD AUTO: 11 % (ref 4–12)
NEUTROPHILS # BLD AUTO: 4.22 THOUSANDS/ÂΜL (ref 1.85–7.62)
NEUTS SEG NFR BLD AUTO: 76 % (ref 43–75)
NRBC BLD AUTO-RTO: 0 /100 WBCS
PLATELET # BLD AUTO: 158 THOUSANDS/UL (ref 149–390)
PMV BLD AUTO: 8.7 FL (ref 8.9–12.7)
POTASSIUM SERPL-SCNC: 3.9 MMOL/L (ref 3.5–5.3)
PROT SERPL-MCNC: 4.7 G/DL (ref 6.4–8.4)
RBC # BLD AUTO: 3.65 MILLION/UL (ref 3.88–5.62)
SODIUM SERPL-SCNC: 135 MMOL/L (ref 135–147)
WBC # BLD AUTO: 5.49 THOUSAND/UL (ref 4.31–10.16)

## 2024-05-24 PROCEDURE — 36415 COLL VENOUS BLD VENIPUNCTURE: CPT

## 2024-05-24 PROCEDURE — 85025 COMPLETE CBC W/AUTO DIFF WBC: CPT | Performed by: EMERGENCY MEDICINE

## 2024-05-24 PROCEDURE — 83690 ASSAY OF LIPASE: CPT | Performed by: EMERGENCY MEDICINE

## 2024-05-24 PROCEDURE — 80053 COMPREHEN METABOLIC PANEL: CPT | Performed by: EMERGENCY MEDICINE

## 2024-05-24 PROCEDURE — 83880 ASSAY OF NATRIURETIC PEPTIDE: CPT | Performed by: EMERGENCY MEDICINE

## 2024-05-24 PROCEDURE — 99283 EMERGENCY DEPT VISIT LOW MDM: CPT

## 2024-05-24 PROCEDURE — 99284 EMERGENCY DEPT VISIT MOD MDM: CPT | Performed by: EMERGENCY MEDICINE

## 2024-05-24 RX ORDER — CEPHALEXIN 250 MG/1
500 CAPSULE ORAL ONCE
Status: COMPLETED | OUTPATIENT
Start: 2024-05-24 | End: 2024-05-24

## 2024-05-24 RX ORDER — CEPHALEXIN 500 MG/1
500 CAPSULE ORAL EVERY 6 HOURS SCHEDULED
Qty: 28 CAPSULE | Refills: 0 | Status: SHIPPED | OUTPATIENT
Start: 2024-05-24 | End: 2024-05-31

## 2024-05-24 RX ADMIN — CEPHALEXIN 500 MG: 250 CAPSULE ORAL at 12:23

## 2024-05-24 NOTE — ED PROVIDER NOTES
History  Chief Complaint   Patient presents with    Arm Swelling     Pt c/o left arm  swelling that started yesterday. Pt also states he has had bilateral lower leg swelling for the past x 2 weeks. Pt denies cp/sob/n/v/d or fevers     83-year-old male presents for evaluation of left forearm redness and swelling that started yesterday.  Patient reports he was gardening 4 days ago and obtained a small cut to his left proximal forearm.  Did not bother him until yesterday where his arm started to swell and the redness spread.  Denies chest pain, shortness of breath, fever and otherwise feels well.  Patient with a history of edema and states his arm was seeping clear fluid which is now resolved.  Denies any purulent drainage.        Prior to Admission Medications   Prescriptions Last Dose Informant Patient Reported? Taking?   apixaban (Eliquis) 5 mg  Self No No   Sig: Take 1 tablet (5 mg total) by mouth 2 (two) times a day   furosemide (LASIX) 40 mg tablet  Self Yes No   Sig: Take 40 mg by mouth daily   metoprolol succinate (TOPROL-XL) 50 mg 24 hr tablet  Self No No   Sig: Take 1 tablet (50 mg total) by mouth daily   tamsulosin (FLOMAX) 0.4 mg  Self Yes No   Sig: Take 0.4 mg by mouth daily with dinner      Facility-Administered Medications: None       Past Medical History:   Diagnosis Date    A-fib (HCC)     Hypertension        Past Surgical History:   Procedure Laterality Date    CARDIAC PACEMAKER PLACEMENT         History reviewed. No pertinent family history.  I have reviewed and agree with the history as documented.    E-Cigarette/Vaping    E-Cigarette Use Never User      E-Cigarette/Vaping Substances     Social History     Tobacco Use    Smoking status: Never    Smokeless tobacco: Never   Vaping Use    Vaping status: Never Used   Substance Use Topics    Alcohol use: Yes     Comment: socially    Drug use: Never       Review of Systems   Constitutional:  Negative for fever.   Skin:  Positive for color change.        Physical Exam  Physical Exam  Vitals and nursing note reviewed.   Constitutional:       General: He is not in acute distress.     Appearance: He is well-developed.   HENT:      Head: Normocephalic and atraumatic.      Right Ear: External ear normal.      Left Ear: External ear normal.      Nose: Nose normal.   Eyes:      General: No scleral icterus.  Pulmonary:      Effort: Pulmonary effort is normal. No respiratory distress.   Abdominal:      General: There is no distension.      Palpations: Abdomen is soft.   Musculoskeletal:         General: No deformity. Normal range of motion.      Cervical back: Normal range of motion and neck supple.   Skin:     General: Skin is warm.      Findings: Erythema present. No rash.      Comments: Large area of erythema of left forearm.  Picture below   Neurological:      General: No focal deficit present.      Mental Status: He is alert.      Gait: Gait normal.   Psychiatric:         Mood and Affect: Mood normal.                 Vital Signs  ED Triage Vitals [05/24/24 1116]   Temperature Pulse Respirations Blood Pressure SpO2   98.1 °F (36.7 °C) 68 18 114/83 96 %      Temp Source Heart Rate Source Patient Position - Orthostatic VS BP Location FiO2 (%)   Oral Monitor Sitting Right arm --      Pain Score       5           Vitals:    05/24/24 1116 05/24/24 1227   BP: 114/83 147/62   Pulse: 68 60   Patient Position - Orthostatic VS: Sitting          Visual Acuity      ED Medications  Medications   cephalexin (KEFLEX) capsule 500 mg (500 mg Oral Given 5/24/24 1223)       Diagnostic Studies  Results Reviewed       Procedure Component Value Units Date/Time    B-Type Natriuretic Peptide(BNP) [[id number]]  (Normal) Collected: 05/24/24 1126    Lab Status: Final result Specimen: Blood from Arm, Right Updated: 05/24/24 1206     BNP 79 pg/mL     Comprehensive metabolic panel [[id number]]  (Abnormal) Collected: 05/24/24 1126    Lab Status: Final result Specimen: Blood from Arm, Right  Updated: 05/24/24 1159     Sodium 135 mmol/L      Potassium 3.9 mmol/L      Chloride 103 mmol/L      CO2 30 mmol/L      ANION GAP 2 mmol/L      BUN 26 mg/dL      Creatinine 1.18 mg/dL      Glucose 115 mg/dL      Calcium 7.7 mg/dL      Corrected Calcium 8.7 mg/dL      AST 17 U/L      ALT 16 U/L      Alkaline Phosphatase 45 U/L      Total Protein 4.7 g/dL      Albumin 2.7 g/dL      Total Bilirubin 0.83 mg/dL      eGFR 57 ml/min/1.73sq m     Narrative:      National Kidney Disease Foundation guidelines for Chronic Kidney Disease (CKD):     Stage 1 with normal or high GFR (GFR > 90 mL/min/1.73 square meters)    Stage 2 Mild CKD (GFR = 60-89 mL/min/1.73 square meters)    Stage 3A Moderate CKD (GFR = 45-59 mL/min/1.73 square meters)    Stage 3B Moderate CKD (GFR = 30-44 mL/min/1.73 square meters)    Stage 4 Severe CKD (GFR = 15-29 mL/min/1.73 square meters)    Stage 5 End Stage CKD (GFR <15 mL/min/1.73 square meters)  Note: GFR calculation is accurate only with a steady state creatinine    Lipase [[id number]]  (Normal) Collected: 05/24/24 1126    Lab Status: Final result Specimen: Blood from Arm, Right Updated: 05/24/24 1159     Lipase 46 u/L     CBC and differential [[id number]]  (Abnormal) Collected: 05/24/24 1126    Lab Status: Final result Specimen: Blood from Arm, Right Updated: 05/24/24 1132     WBC 5.49 Thousand/uL      RBC 3.65 Million/uL      Hemoglobin 11.9 g/dL      Hematocrit 35.3 %      MCV 97 fL      MCH 32.6 pg      MCHC 33.7 g/dL      RDW 15.0 %      MPV 8.7 fL      Platelets 158 Thousands/uL      nRBC 0 /100 WBCs      Segmented % 76 %      Immature Grans % 1 %      Lymphocytes % 11 %      Monocytes % 11 %      Eosinophils Relative 1 %      Basophils Relative 0 %      Absolute Neutrophils 4.22 Thousands/µL      Absolute Immature Grans 0.04 Thousand/uL      Absolute Lymphocytes 0.61 Thousands/µL      Absolute Monocytes 0.58 Thousand/µL      Eosinophils Absolute 0.03 Thousand/µL      Basophils Absolute 0.01  Thousands/µL                    No orders to display              Procedures  Procedures         ED Course                                             Medical Decision Making  83-year-old male presenting with left forearm cellulitis.  Vital signs stable.  No leukocytosis.  Stable for outpatient antibiotics.  Very strict return precautions discussed given size of cellulitis.  Patient okay with trial of outpatient treatment.     Amount and/or Complexity of Data Reviewed  Labs: ordered.    Risk  Prescription drug management.             Disposition  Final diagnoses:   Cellulitis     Time reflects when diagnosis was documented in both MDM as applicable and the Disposition within this note       Time User Action Codes Description Comment    5/24/2024 12:23 PM Black Joseph [L03.90] Cellulitis           ED Disposition       ED Disposition   Discharge    Condition   Stable    Date/Time   Fri May 24, 2024 12:23 PM    Comment   Daniel Martins discharge to home/self care.                   Follow-up Information       Follow up With Specialties Details Why Contact Info Additional Information    Bacilio Estrada, DO Family Medicine In 2 days For wound re-check [address] [address]  [phone]        [facility] Emergency Department Emergency Medicine  If symptoms worsen [address] [facility] [address] [phone] [facility] Emergency Department, [address] [place], [place] [address]            Discharge Medication List as of 5/24/2024 12:23 PM        START taking these medications    Details   cephalexin (KEFLEX) 500 mg capsule Take 1 capsule (500 mg total) by mouth every 6 (six) hours for 7 days, Starting Fri 5/24/2024, Until Fri 5/31/2024, Normal           CONTINUE these medications which have NOT CHANGED    Details   apixaban (Eliquis) 5 mg Take 1 tablet (5 mg total) by mouth 2 (two) times a day, Starting  Thu 5/4/2023, Normal      furosemide (LASIX) 40 mg tablet Take 40 mg by mouth daily, Starting Tue 3/19/2024, Historical Med      metoprolol succinate (TOPROL-XL) 50 mg 24 hr tablet Take 1 tablet (50 mg total) by mouth daily, Starting Thu 5/4/2023, Normal      tamsulosin (FLOMAX) 0.4 mg Take 0.4 mg by mouth daily with dinner, Historical Med             No discharge procedures on file.    PDMP Review       None            ED Provider  Electronically Signed by             Black Joseph DO  05/24/24 5350

## 2024-06-05 ENCOUNTER — HOSPITAL ENCOUNTER (OUTPATIENT)
Dept: NON INVASIVE DIAGNOSTICS | Age: 84
Discharge: HOME | End: 2024-06-05
Payer: COMMERCIAL

## 2024-06-05 VITALS
HEART RATE: 86 BPM | WEIGHT: 155 LBS | HEIGHT: 71 IN | SYSTOLIC BLOOD PRESSURE: 147 MMHG | DIASTOLIC BLOOD PRESSURE: 62 MMHG | BODY MASS INDEX: 21.7 KG/M2

## 2024-06-05 DIAGNOSIS — R60.0 BILATERAL LEG EDEMA: ICD-10-CM

## 2024-06-05 LAB
AORTIC ROOT: 3.9 CM
APICAL FOUR CHAMBER EJECTION FRACTION: 64 %
ASCENDING AORTA: 3.9 CM
BSA FOR ECHO PROCEDURE: 1.89 M2
E WAVE DECELERATION TIME: 139 MS
E/A RATIO: 1.36
FRACTIONAL SHORTENING: 34 (ref 28–44)
INTERVENTRICULAR SEPTUM IN DIASTOLE (PARASTERNAL SHORT AXIS VIEW): 1.3 CM
INTERVENTRICULAR SEPTUM: 1.3 CM (ref 0.6–1.1)
LAAS-AP2: 15.3 CM2
LAAS-AP4: 15.9 CM2
LEFT ATRIUM SIZE: 3.9 CM
LEFT ATRIUM VOLUME (MOD BIPLANE): 44 ML
LEFT ATRIUM VOLUME INDEX (MOD BIPLANE): 23.3 ML/M2
LEFT INTERNAL DIMENSION IN SYSTOLE: 3.1 CM (ref 2.1–4)
LEFT VENTRICULAR INTERNAL DIMENSION IN DIASTOLE: 4.7 CM (ref 3.5–6)
LEFT VENTRICULAR POSTERIOR WALL IN END DIASTOLE: 1.2 CM
LEFT VENTRICULAR STROKE VOLUME: 62 ML
LVSV (TEICH): 62 ML
MV E'TISSUE VEL-LAT: 10 CM/S
MV E'TISSUE VEL-SEP: 9 CM/S
MV PEAK A VEL: 0.67 M/S
MV PEAK E VEL: 91 CM/S
MV STENOSIS PRESSURE HALF TIME: 40 MS
MV VALVE AREA P 1/2 METHOD: 5.5
RIGHT ATRIUM AREA SYSTOLE A4C: 12.5 CM2
RIGHT VENTRICLE ID DIMENSION: 3.2 CM
SL CV LEFT ATRIUM LENGTH A2C: 4.3 CM
SL CV LV EF: 55
SL CV PED ECHO LEFT VENTRICLE DIASTOLIC VOLUME (MOD BIPLANE) 2D: 101 ML
SL CV PED ECHO LEFT VENTRICLE SYSTOLIC VOLUME (MOD BIPLANE) 2D: 39 ML
TR MAX PG: 35 MMHG
TR PEAK VELOCITY: 3 M/S
TRICUSPID ANNULAR PLANE SYSTOLIC EXCURSION: 3.1 CM
TRICUSPID VALVE PEAK REGURGITATION VELOCITY: 2.98 M/S

## 2024-06-05 PROCEDURE — 93306 TTE W/DOPPLER COMPLETE: CPT | Performed by: INTERNAL MEDICINE

## 2024-06-05 PROCEDURE — 93306 TTE W/DOPPLER COMPLETE: CPT

## 2024-06-17 NOTE — PROGRESS NOTES
"Cardiology Office Follow Up  Daniel Martins  1940  [id number]      ASSESSMENT:   Paroxysmal atrial fibrillation (HCC)  Overview:  On rate control with metoprolol XL  Anticoagulated with Eliquis 5 mg BID  Device interrogation March 2024 showed 0.5% AF burden  Assessment & Plan:  Continue current medications.  2. Aneurysm of ascending aorta without rupture (HCC)  Overview:  CT on 10/11/23: 4.1 cm ascending aorta.  On anti-impulse therapy with beta-blocker.  Will need annual surveillance.  Assessment & Plan:  Next CT scan approximately October 2024  3. Sick sinus syndrome (HCC)  Overview:  [employer] PPM implanted Oct 2019  4. Bilateral leg edema  Assessment & Plan:  6/5/2024 echo: EF 55% mild regurgitant valvular disease  5/24/2024 lab work showing normal BNP    PLAN/ DISCUSSION:  Atrial paced rhythm on EKG today  Continue apixaban and metoprolol  Continue furosemide 40 daily  Lower extremity edema still present today which I suspect is multifactorial and perhaps in part from hypoalbuminemia.  Improving protein in diet was discussed.  Daily weights and low-sodium discussed in detail as well.  No changes were made to medications today.  We will see him back in 6 months or sooner if needed.    Interval History/ HPI:   He was last seen in the office about 1 month ago at which time amlodipine was discontinued and he was changed to lisinopril given lower extremity edema.  In the interim he has had an echocardiogram which was fairly unremarkable.  He is here today for follow-up.    Today comes the office unaccompanied.  He reports some intermittent neck pain which may be related to prior Lyme disease.  He reports that his legs are still swollen but no worse than usual.  He previously felt that there was some swelling in his arms but this has resolved.     Vitals:  /68 (BP Location: Left arm, Patient Position: Sitting, Cuff Size: Standard)   Pulse 62   Ht 5' 11\" (1.803 m)   Wt 70 kg (154 lb 6.4 oz)   BMI 21.53 " kg/m²      Past Medical History:   Diagnosis Date    A-fib (HCC)     Hypertension      Social History     Socioeconomic History    Marital status: /Civil Union     Spouse name: Not on file    Number of children: Not on file    Years of education: Not on file    Highest education level: Not on file   Occupational History    Not on file   Tobacco Use    Smoking status: Never    Smokeless tobacco: Never   Vaping Use    Vaping status: Never Used   Substance and Sexual Activity    Alcohol use: Yes     Comment: socially    Drug use: Never    Sexual activity: Not on file   Other Topics Concern    Not on file   Social History Narrative    Not on file     Social Determinants of Health     Financial Resource Strain: Not on file   Food Insecurity: Not on file   Transportation Needs: Not on file   Physical Activity: Not on file   Stress: Not on file   Social Connections: Not on file   Intimate Partner Violence: Not on file   Housing Stability: Not on file      No family history on file.  Past Surgical History:   Procedure Laterality Date    CARDIAC PACEMAKER PLACEMENT         Current Outpatient Medications:     apixaban (Eliquis) 5 mg, Take 1 tablet (5 mg total) by mouth 2 (two) times a day, Disp: 90 tablet, Rfl: 3    furosemide (LASIX) 40 mg tablet, Take 40 mg by mouth daily, Disp: , Rfl:     metoprolol succinate (TOPROL-XL) 50 mg 24 hr tablet, Take 1 tablet (50 mg total) by mouth daily, Disp: 90 tablet, Rfl: 3    tamsulosin (FLOMAX) 0.4 mg, Take 0.4 mg by mouth daily with dinner, Disp: , Rfl:       Review of Systems:  Review of Systems   Constitutional:  Negative for chills and fever.   HENT:  Negative for ear pain and sore throat.    Eyes:  Negative for pain and visual disturbance.   Respiratory:  Negative for cough and shortness of breath.    Cardiovascular:  Positive for leg swelling. Negative for chest pain and palpitations.   Gastrointestinal:  Negative for abdominal pain and vomiting.   Genitourinary:  Negative  for dysuria and hematuria.   Musculoskeletal:  Negative for arthralgias and back pain.   Skin:  Negative for color change and rash.   Neurological:  Negative for seizures and syncope.   All other systems reviewed and are negative.        Physical Exam:  Physical Exam  Constitutional:       Appearance: He is well-developed.   HENT:      Head: Normocephalic and atraumatic.   Eyes:      Pupils: Pupils are equal, round, and reactive to light.   Cardiovascular:      Rate and Rhythm: Normal rate and regular rhythm.      Heart sounds: No murmur heard.     No friction rub. No gallop.   Pulmonary:      Effort: Pulmonary effort is normal. No respiratory distress.      Breath sounds: Normal breath sounds. No wheezing or rales.   Abdominal:      General: Bowel sounds are normal. There is no distension.      Palpations: Abdomen is soft.      Tenderness: There is no abdominal tenderness.   Musculoskeletal:         General: Swelling present. No deformity. Normal range of motion.      Cervical back: Normal range of motion and neck supple.   Skin:     General: Skin is warm and dry.   Neurological:      Mental Status: He is alert and oriented to person, place, and time.   Psychiatric:         Behavior: Behavior normal.         This note was completed in part utilizing M-Modal Fluency Direct Software.  Grammatical errors, random word insertions, spelling mistakes, and incomplete sentences can be an occasional consequence of this system secondary to software limitations, ambient noise, and hardware issues.  If you have any questions or concerns about the content, text, or information contained within the body of this dictation, please contact the provider for clarification.

## 2024-06-20 DIAGNOSIS — I10 PRIMARY HYPERTENSION: ICD-10-CM

## 2024-06-20 DIAGNOSIS — I48.91 ATRIAL FIBRILLATION, UNSPECIFIED TYPE (HCC): ICD-10-CM

## 2024-06-20 RX ORDER — METOPROLOL SUCCINATE 50 MG/1
50 TABLET, EXTENDED RELEASE ORAL DAILY
Qty: 90 TABLET | Refills: 1 | Status: SHIPPED | OUTPATIENT
Start: 2024-06-20

## 2024-06-20 RX ORDER — AMLODIPINE BESYLATE 10 MG/1
10 TABLET ORAL DAILY
Qty: 90 TABLET | Refills: 3 | OUTPATIENT
Start: 2024-06-20

## 2024-06-25 ENCOUNTER — OFFICE VISIT (OUTPATIENT)
Dept: CARDIOLOGY CLINIC | Facility: CLINIC | Age: 84
End: 2024-06-25
Payer: COMMERCIAL

## 2024-06-25 VITALS
SYSTOLIC BLOOD PRESSURE: 126 MMHG | DIASTOLIC BLOOD PRESSURE: 68 MMHG | HEIGHT: 71 IN | HEART RATE: 62 BPM | WEIGHT: 154.4 LBS | BODY MASS INDEX: 21.61 KG/M2

## 2024-06-25 DIAGNOSIS — I48.0 PAROXYSMAL ATRIAL FIBRILLATION (HCC): ICD-10-CM

## 2024-06-25 DIAGNOSIS — I49.5 SICK SINUS SYNDROME (HCC): Primary | ICD-10-CM

## 2024-06-25 DIAGNOSIS — I10 PRIMARY HYPERTENSION: ICD-10-CM

## 2024-06-25 PROCEDURE — 99214 OFFICE O/P EST MOD 30 MIN: CPT | Performed by: PHYSICIAN ASSISTANT

## 2024-06-25 PROCEDURE — 93000 ELECTROCARDIOGRAM COMPLETE: CPT | Performed by: PHYSICIAN ASSISTANT

## 2024-06-25 NOTE — PATIENT INSTRUCTIONS
No changes to medications today, please continue everything the same. If you need refills of your cardiac medications prescribed by our office, please call us ahead of time so that they can be filled.   Please try to increase the protein in your diet   We will see you back in the office in 6 months. If you have any questions or concerns in the mean time please do not hesitate to call our office.

## 2024-07-01 ENCOUNTER — REMOTE DEVICE CLINIC VISIT (OUTPATIENT)
Dept: CARDIOLOGY CLINIC | Facility: CLINIC | Age: 84
End: 2024-07-01
Payer: COMMERCIAL

## 2024-07-01 DIAGNOSIS — Z95.0 CARDIAC PACEMAKER IN SITU: Primary | ICD-10-CM

## 2024-07-01 PROCEDURE — 93294 REM INTERROG EVL PM/LDLS PM: CPT | Performed by: INTERNAL MEDICINE

## 2024-07-01 PROCEDURE — 93296 REM INTERROG EVL PM/IDS: CPT | Performed by: INTERNAL MEDICINE

## 2024-07-02 NOTE — PROGRESS NOTES
"Results for orders placed or performed in visit on 07/01/24   Cardiac EP device report    Narrative    CARELINK TRANSMISSION: BATTERY STATUS \"8 YRS.\" %  0%. ALL AVAILABLE LEAD PARAMETERS WITHIN NORMAL LIMITS. NO SIGNIFICANT HIGH RATE EPISODES. NORMAL DEVICE FUNCTION. NC         "

## 2024-09-30 ENCOUNTER — REMOTE DEVICE CLINIC VISIT (OUTPATIENT)
Dept: CARDIOLOGY CLINIC | Facility: CLINIC | Age: 84
End: 2024-09-30
Payer: COMMERCIAL

## 2024-09-30 DIAGNOSIS — Z95.0 CARDIAC PACEMAKER IN SITU: Primary | ICD-10-CM

## 2024-09-30 PROCEDURE — 93294 REM INTERROG EVL PM/LDLS PM: CPT | Performed by: INTERNAL MEDICINE

## 2024-09-30 PROCEDURE — 93296 REM INTERROG EVL PM/IDS: CPT | Performed by: INTERNAL MEDICINE

## 2024-09-30 NOTE — PROGRESS NOTES
Results for orders placed or performed in visit on 09/30/24   Cardiac EP device report    Narrative    MDT DC PPM (MVP ON) - ACTIVE SYSTEM IS MRI CONDITIONAL  CARELINK TRANSMISSION: BATTERY VOLTAGE ADEQUATE (7.9 YR). AP 99.5%  <0.1% (AAIR-DDDR 60PPM). ALL AVAILABLE LEAD PARAMETERS WITHIN NORMAL LIMITS. 1 VT-NS EPISODE WITH SVT ON EGM ( MORPHOLOGY SIMILARE TO INTRINSIC QRS). EF 55% (6/5/24 ECHO). PATIENT TAKING ELIQUIS, METOPROLOL SUCC. NORMAL DEVICE FUNCTION.  ES

## 2024-11-07 ENCOUNTER — HOSPITAL ENCOUNTER (INPATIENT)
Facility: HOSPITAL | Age: 84
LOS: 2 days | Discharge: HOME | DRG: 603 | End: 2024-11-09
Attending: EMERGENCY MEDICINE | Admitting: INTERNAL MEDICINE
Payer: COMMERCIAL

## 2024-11-07 DIAGNOSIS — L03.114 LEFT ARM CELLULITIS: Primary | ICD-10-CM

## 2024-11-07 DIAGNOSIS — M79.89 LEFT ARM SWELLING: ICD-10-CM

## 2024-11-07 LAB
ALBUMIN SERPL BCG-MCNC: 2.4 G/DL (ref 3.5–5)
ALP SERPL-CCNC: 44 U/L (ref 34–104)
ALT SERPL W P-5'-P-CCNC: 10 U/L (ref 7–52)
ANION GAP SERPL CALCULATED.3IONS-SCNC: 3 MMOL/L (ref 4–13)
APTT PPP: 34 SECONDS (ref 23–34)
AST SERPL W P-5'-P-CCNC: 13 U/L (ref 13–39)
BASOPHILS # BLD AUTO: 0.01 THOUSANDS/ÂΜL (ref 0–0.1)
BASOPHILS NFR BLD AUTO: 0 % (ref 0–1)
BILIRUB SERPL-MCNC: 0.96 MG/DL (ref 0.2–1)
BUN SERPL-MCNC: 18 MG/DL (ref 5–25)
CALCIUM ALBUM COR SERPL-MCNC: 9 MG/DL (ref 8.3–10.1)
CALCIUM SERPL-MCNC: 7.7 MG/DL (ref 8.4–10.2)
CHLORIDE SERPL-SCNC: 104 MMOL/L (ref 96–108)
CO2 SERPL-SCNC: 28 MMOL/L (ref 21–32)
CREAT SERPL-MCNC: 1.09 MG/DL (ref 0.6–1.3)
EOSINOPHIL # BLD AUTO: 0.04 THOUSAND/ÂΜL (ref 0–0.61)
EOSINOPHIL NFR BLD AUTO: 1 % (ref 0–6)
ERYTHROCYTE [DISTWIDTH] IN BLOOD BY AUTOMATED COUNT: 14.5 % (ref 11.6–15.1)
GFR SERPL CREATININE-BSD FRML MDRD: 62 ML/MIN/1.73SQ M
GLUCOSE SERPL-MCNC: 119 MG/DL (ref 65–140)
HCT VFR BLD AUTO: 37.5 % (ref 36.5–49.3)
HGB BLD-MCNC: 13.1 G/DL (ref 12–17)
IMM GRANULOCYTES # BLD AUTO: 0.02 THOUSAND/UL (ref 0–0.2)
IMM GRANULOCYTES NFR BLD AUTO: 0 % (ref 0–2)
INR PPP: 1.07 (ref 0.85–1.19)
LACTATE SERPL-SCNC: 0.6 MMOL/L (ref 0.5–2)
LYMPHOCYTES # BLD AUTO: 0.65 THOUSANDS/ÂΜL (ref 0.6–4.47)
LYMPHOCYTES NFR BLD AUTO: 14 % (ref 14–44)
MCH RBC QN AUTO: 34.4 PG (ref 26.8–34.3)
MCHC RBC AUTO-ENTMCNC: 34.9 G/DL (ref 31.4–37.4)
MCV RBC AUTO: 98 FL (ref 82–98)
MONOCYTES # BLD AUTO: 0.5 THOUSAND/ÂΜL (ref 0.17–1.22)
MONOCYTES NFR BLD AUTO: 11 % (ref 4–12)
NEUTROPHILS # BLD AUTO: 3.53 THOUSANDS/ÂΜL (ref 1.85–7.62)
NEUTS SEG NFR BLD AUTO: 74 % (ref 43–75)
NRBC BLD AUTO-RTO: 0 /100 WBCS
PLATELET # BLD AUTO: 172 THOUSANDS/UL (ref 149–390)
PMV BLD AUTO: 9.6 FL (ref 8.9–12.7)
POTASSIUM SERPL-SCNC: 3.7 MMOL/L (ref 3.5–5.3)
PROCALCITONIN SERPL-MCNC: 0.56 NG/ML
PROT SERPL-MCNC: 4.6 G/DL (ref 6.4–8.4)
PROTHROMBIN TIME: 14.4 SECONDS (ref 12.3–15)
RBC # BLD AUTO: 3.81 MILLION/UL (ref 3.88–5.62)
SODIUM SERPL-SCNC: 135 MMOL/L (ref 135–147)
WBC # BLD AUTO: 4.75 THOUSAND/UL (ref 4.31–10.16)

## 2024-11-07 PROCEDURE — 87040 BLOOD CULTURE FOR BACTERIA: CPT

## 2024-11-07 PROCEDURE — 93005 ELECTROCARDIOGRAM TRACING: CPT

## 2024-11-07 PROCEDURE — 85610 PROTHROMBIN TIME: CPT

## 2024-11-07 PROCEDURE — 85730 THROMBOPLASTIN TIME PARTIAL: CPT

## 2024-11-07 PROCEDURE — 80053 COMPREHEN METABOLIC PANEL: CPT

## 2024-11-07 PROCEDURE — 84145 PROCALCITONIN (PCT): CPT

## 2024-11-07 PROCEDURE — 83605 ASSAY OF LACTIC ACID: CPT

## 2024-11-07 PROCEDURE — 99285 EMERGENCY DEPT VISIT HI MDM: CPT

## 2024-11-07 PROCEDURE — 36415 COLL VENOUS BLD VENIPUNCTURE: CPT

## 2024-11-07 PROCEDURE — 99223 1ST HOSP IP/OBS HIGH 75: CPT | Performed by: INTERNAL MEDICINE

## 2024-11-07 PROCEDURE — 96365 THER/PROPH/DIAG IV INF INIT: CPT

## 2024-11-07 PROCEDURE — 99284 EMERGENCY DEPT VISIT MOD MDM: CPT

## 2024-11-07 PROCEDURE — 85025 COMPLETE CBC W/AUTO DIFF WBC: CPT

## 2024-11-07 RX ORDER — CEFAZOLIN SODIUM 2 G/50ML
2000 SOLUTION INTRAVENOUS EVERY 8 HOURS
Status: DISCONTINUED | OUTPATIENT
Start: 2024-11-07 | End: 2024-11-09 | Stop reason: HOSPADM

## 2024-11-07 RX ORDER — CEFEPIME HYDROCHLORIDE 2 G/50ML
2000 INJECTION, SOLUTION INTRAVENOUS ONCE
Status: COMPLETED | OUTPATIENT
Start: 2024-11-07 | End: 2024-11-07

## 2024-11-07 RX ORDER — FUROSEMIDE 40 MG/1
40 TABLET ORAL DAILY
Status: DISCONTINUED | OUTPATIENT
Start: 2024-11-07 | End: 2024-11-09 | Stop reason: HOSPADM

## 2024-11-07 RX ORDER — METOPROLOL SUCCINATE 50 MG/1
50 TABLET, EXTENDED RELEASE ORAL DAILY
Status: DISCONTINUED | OUTPATIENT
Start: 2024-11-07 | End: 2024-11-09 | Stop reason: HOSPADM

## 2024-11-07 RX ORDER — TAMSULOSIN HYDROCHLORIDE 0.4 MG/1
0.4 CAPSULE ORAL
Status: DISCONTINUED | OUTPATIENT
Start: 2024-11-07 | End: 2024-11-09 | Stop reason: HOSPADM

## 2024-11-07 RX ORDER — ACETAMINOPHEN 325 MG/1
650 TABLET ORAL EVERY 6 HOURS PRN
Status: DISCONTINUED | OUTPATIENT
Start: 2024-11-07 | End: 2024-11-09 | Stop reason: HOSPADM

## 2024-11-07 RX ADMIN — CEFEPIME HYDROCHLORIDE 2000 MG: 2 INJECTION, SOLUTION INTRAVENOUS at 09:31

## 2024-11-07 RX ADMIN — CEFAZOLIN SODIUM 2000 MG: 2 SOLUTION INTRAVENOUS at 21:17

## 2024-11-07 RX ADMIN — APIXABAN 5 MG: 5 TABLET, FILM COATED ORAL at 12:41

## 2024-11-07 RX ADMIN — APIXABAN 5 MG: 5 TABLET, FILM COATED ORAL at 21:17

## 2024-11-07 RX ADMIN — TAMSULOSIN HYDROCHLORIDE 0.4 MG: 0.4 CAPSULE ORAL at 17:33

## 2024-11-07 RX ADMIN — FUROSEMIDE 40 MG: 40 TABLET ORAL at 12:41

## 2024-11-07 RX ADMIN — METOPROLOL SUCCINATE 50 MG: 50 TABLET, EXTENDED RELEASE ORAL at 12:41

## 2024-11-07 NOTE — PLAN OF CARE

## 2024-11-07 NOTE — ASSESSMENT & PLAN NOTE
Presented with left upper extremity swelling and redness.  Patient reported he usually has intermittent swelling in his hands however he noticed redness and worsening swelling in the beginning of the week.  Fell about 3 weeks ago and had skin tear however it was healing well.   Not meeting sepsis criteria  Denied any spider bites.  Start Ancef  Ace wrap  Extremity elevation  If improvement, discharge tomorrow on oral antibiotics

## 2024-11-07 NOTE — H&P
H&P - Hospitalist   Name: Daniel Martins 84 y.o. male I MRN: [id number]  Unit/Bed#: -01 I Date of Admission: 11/7/2024   Date of Service: 11/7/2024 I Hospital Day: 0     Assessment & Plan  Cellulitis of left upper extremity  Presented with left upper extremity swelling and redness.  Patient reported he usually has intermittent swelling in his hands however he noticed redness and worsening swelling in the beginning of the week.  Fell about 3 weeks ago and had skin tear however it was healing well.   Not meeting sepsis criteria  Denied any spider bites.  Start Ancef  Ace wrap  Extremity elevation  If improvement, discharge tomorrow on oral antibiotics  Hypertension  Continue metoprolol 50 mg and Lasix  Monitor  Atrial fibrillation (HCC)  History of A-fib  On Toprol and Eliquis  Continue  Bilateral leg edema  History of bilateral lower extremity edema  Continue Lasix      VTE Pharmacologic Prophylaxis: VTE Score: 3 Moderate Risk (Score 3-4) - Pharmacological DVT Prophylaxis Ordered: apixaban (Eliquis).  Code Status: Level 1 - Full Code   Discussion with family: Patient declined call to .     Anticipated Length of Stay: Patient will be admitted on an inpatient basis with an anticipated length of stay of greater than 2 midnights secondary to cellulitis, upper extremity swelling .    History of Present Illness   Chief Complaint: Left upper extremity swelling and redness    Daniel Martins is a 84 y.o. male with a PMH of A-fib, hypertension, bilateral leg edema, sick sinus syndrome status post pacemaker, who presents with left upper extremity edema and redness.  Patient reported 3 weeks ago he fell on the left arm and had a skin tear however that healed well.  He has intermittently bilateral hands edema however towards the beginning of this week he noted worsening left upper extremity swelling and redness mostly around his elbow.    Presented to emergency department.  Stable vital signs on presentation.   Was admitted for cellulitis management.    Review of Systems   Constitutional:  Negative for chills and fever.   Respiratory:  Negative for shortness of breath.    Cardiovascular:  Positive for leg swelling. Negative for chest pain.   Gastrointestinal:  Negative for abdominal pain, diarrhea, nausea and vomiting.   Genitourinary:  Negative for difficulty urinating.   Musculoskeletal:  Positive for joint swelling.   All other systems reviewed and are negative.      Historical Information   Past Medical History:   Diagnosis Date    A-fib (HCC)     Hypertension     Lyme disease      Past Surgical History:   Procedure Laterality Date    CARDIAC PACEMAKER PLACEMENT       Social History     Tobacco Use    Smoking status: Never    Smokeless tobacco: Never   Vaping Use    Vaping status: Never Used   Substance and Sexual Activity    Alcohol use: Yes     Comment: socially    Drug use: Never    Sexual activity: Not on file     E-Cigarette/Vaping    E-Cigarette Use Never User      E-Cigarette/Vaping Substances     History reviewed. No pertinent family history.  Social History:  Marital Status: /Civil Union   Occupation: retired   Patient Pre-hospital Living Situation: Home  Patient Pre-hospital Level of Mobility: walks  Patient Pre-hospital Diet Restrictions: none    Meds/Allergies   I have reviewed home medications with patient personally.  Prior to Admission medications    Medication Sig Start Date End Date Taking? Authorizing Provider   apixaban (Eliquis) 5 mg Take 1 tablet (5 mg total) by mouth 2 (two) times a day 5/4/23  Yes Bertrand Cordero MD   furosemide (LASIX) 40 mg tablet Take 40 mg by mouth daily 3/19/24  Yes Historical Provider, MD   metoprolol succinate (TOPROL-XL) 50 mg 24 hr tablet TAKE 1 TABLET BY MOUTH EVERY DAY 6/20/24  Yes Bertrand Cordero MD   tamsulosin (FLOMAX) 0.4 mg Take 0.4 mg by mouth daily with dinner   Yes Historical Provider, MD     No Known Allergies    Objective :  Temp:  [97 °F  (36.1 °C)-97.8 °F (36.6 °C)] 97.2 °F (36.2 °C)  HR:  [60-97] 63  BP: (105-179)/(63-92) 105/63  Resp:  [15-20] 20  SpO2:  [95 %-99 %] 97 %  O2 Device: None (Room air)    Physical Exam  Vitals and nursing note reviewed.   Constitutional:       General: He is not in acute distress.     Appearance: He is not diaphoretic.   HENT:      Head: Normocephalic.   Eyes:      General:         Right eye: No discharge.         Left eye: No discharge.   Cardiovascular:      Rate and Rhythm: Normal rate and regular rhythm.   Pulmonary:      Effort: Pulmonary effort is normal. No respiratory distress.      Breath sounds: Normal breath sounds. No wheezing, rhonchi or rales.   Abdominal:      General: There is no distension.      Palpations: Abdomen is soft.      Tenderness: There is no abdominal tenderness. There is no guarding or rebound.   Musculoskeletal:         General: Swelling present.      Cervical back: Normal range of motion.      Right lower leg: Edema (trace) present.      Left lower leg: Edema (trace) present.   Skin:     General: Skin is warm.      Findings: Erythema present.   Neurological:      Mental Status: He is alert and oriented to person, place, and time.   Psychiatric:         Mood and Affect: Mood normal.         Behavior: Behavior normal.         Thought Content: Thought content normal.         Judgment: Judgment normal.          Lines/Drains:            Lab Results: I have reviewed the following results:  Results from last 7 days   Lab Units 11/07/24  0921   WBC Thousand/uL 4.75   HEMOGLOBIN g/dL 13.1   HEMATOCRIT % 37.5   PLATELETS Thousands/uL 172   SEGS PCT % 74   LYMPHO PCT % 14   MONO PCT % 11   EOS PCT % 1     Results from last 7 days   Lab Units 11/07/24  0921   SODIUM mmol/L 135   POTASSIUM mmol/L 3.7   CHLORIDE mmol/L 104   CO2 mmol/L 28   BUN mg/dL 18   CREATININE mg/dL 1.09   ANION GAP mmol/L 3*   CALCIUM mg/dL 7.7*   ALBUMIN g/dL 2.4*   TOTAL BILIRUBIN mg/dL 0.96   ALK PHOS U/L 44   ALT U/L 10  "  AST U/L 13   GLUCOSE RANDOM mg/dL 119     Results from last 7 days   Lab Units 11/07/24  0921   INR  1.07         No results found for: \"HGBA1C\"  Results from last 7 days   Lab Units 11/07/24  0921   LACTIC ACID mmol/L 0.6   PROCALCITONIN ng/ml 0.56*       Imaging Results Review: No pertinent imaging studies reviewed.  Other Study Results Review: EKG was reviewed.     Administrative Statements       ** Please Note: This note has been constructed using a voice recognition system. **    "

## 2024-11-07 NOTE — ED PROVIDER NOTES
Time reflects when diagnosis was documented in both MDM as applicable and the Disposition within this note       Time User Action Codes Description Comment    11/7/2024 11:22 AM Val Bean Add [L03.114] Left arm cellulitis           ED Disposition       ED Disposition   Admit    Condition   Stable    Date/Time   Thu Nov 7, 2024 11:22 AM    Comment   Case was discussed with Dr. Sanchez and the patient's admission status was agreed to be Admission Status: inpatient status to the service of Dr. Sanchez .               Assessment & Plan       Medical Decision Making  DDX including but not limited to: Cellulitis, dependent edema; considered but doubt abscess or DVT    Patient with wound to the left arm now with 2 weeks of redness, warmth, edema, and swelling.  Exam is consistent with an acute cellulitis of the left upper extremity.  He is compliant with his Eliquis and has no prior VTE's, doubt DVT.  Given heart rate of 97 on arrival, will obtain sepsis screening labs.  Will give first dose of IV cefepime.       Problems Addressed:  Left arm cellulitis: acute illness or injury    Amount and/or Complexity of Data Reviewed  Labs: ordered.    Risk  OTC drugs.  Prescription drug management.  Decision regarding hospitalization.        ED Course as of 11/07/24 1139   Thu Nov 07, 2024   1105 Patient updated on results, he notes no pain at rest, only some pain when moving the arm at times.  Given the extent of his exam and progression of symptoms x 2 weeks, I do recommend admission for IV antibiotics and reevaluation prior to discharge on oral antibiotics.  The patient reports he is going to call his wife to make sure that is okay   1125 The patient spoke to his wife and does feel comfortable moving forward with hospitalization for IV antibiotics.  I discussed the case with Dr. Sanchez from [facility] who was accepting of admission       Medications   cefepime (MAXIPIME) IVPB (premix in dextrose) 2,000 mg 50 mL (0 mg  Intravenous Stopped 11/7/24 1036)       ED Risk Strat Scores                           SBIRT 20yo+      Flowsheet Row Most Recent Value   Initial Alcohol Screen: US AUDIT-C     1. How often do you have a drink containing alcohol? 6 Filed at: 11/07/2024 0850   2. How many drinks containing alcohol do you have on a typical day you are drinking?  1 Filed at: 11/07/2024 0850   3a. Male UNDER 65: How often do you have five or more drinks on one occasion? 0 Filed at: 11/07/2024 0850   Audit-C Score 7 Filed at: 11/07/2024 0850   Full Alcohol Screen: US AUDIT    4. How often during the last year have you found that you were not able to stop drinking once you had started? 0 Filed at: 11/07/2024 0850   5. How often during past year have you failed to do what was normally expected of you because of drinking?  0 Filed at: 11/07/2024 0850   6. How often in past year have you needed a first drink in the morning to get yourself going after a heavy drinking session?  0 Filed at: 11/07/2024 0850   7. How often in past year have you had feeling of guilt or remorse after drinking?  0 Filed at: 11/07/2024 0850   8. How often in past year have you been unable to remember what happened night before because you had been drinking?  0 Filed at: 11/07/2024 0850   9. Have you or someone else been injured as a result of your drinking?  0 Filed at: 11/07/2024 0850   10. Has a relative, friend, doctor or other health worker been concerned about your drinking and suggested you cut down?  0 Filed at: 11/07/2024 0850   AUDIT Total Score 7 Filed at: 11/07/2024 0850   LEIA: How many times in the past year have you...    Used an illegal drug or used a prescription medication for non-medical reasons? Never Filed at: 11/07/2024 0850              Aramis' Criteria for DVT      Flowsheet Row Most Recent Value   Wells' Criteria for DVT    Active cancer Treatment or palliation within 6 months 0 Filed at: 11/07/2024 0909   Bedridden recently >3 days or major  surgery within 12 weeks 0 Filed at: 11/07/2024 0909   Calf swelling >3 cm compared to the other leg 0 Filed at: 11/07/2024 0909   Entire leg swollen 0 Filed at: 11/07/2024 0909   Collateral (nonvaricose) superficial veins present 0 Filed at: 11/07/2024 0909   Localized tenderness along the deep venous system 0 Filed at: 11/07/2024 0909   Pitting edema, confined to symptomatic leg 1 Filed at: 11/07/2024 0909   Paralysis, paresis, or recent plaster immobilization of the lower extremity 0 Filed at: 11/07/2024 0909   Previously documented DVT 0 Filed at: 11/07/2024 0909   Alternative diagnosis to DVT as likely or more likely 2 Filed at: 11/07/2024 0909   Wells DVT Critera Score -1 Filed at: 11/07/2024 0909                      History of Present Illness       Chief Complaint   Patient presents with    Arm Swelling     Patient presents to the ER with left arm swelling.       Past Medical History:   Diagnosis Date    A-fib (HCC)     Hypertension     Lyme disease       Past Surgical History:   Procedure Laterality Date    CARDIAC PACEMAKER PLACEMENT        History reviewed. No pertinent family history.   Social History     Tobacco Use    Smoking status: Never    Smokeless tobacco: Never   Vaping Use    Vaping status: Never Used   Substance Use Topics    Alcohol use: Yes     Comment: socially    Drug use: Never      E-Cigarette/Vaping    E-Cigarette Use Never User       E-Cigarette/Vaping Substances      I have reviewed and agree with the history as documented.     The patient is an 84-year-old male with PMH of HTN and A-fib on Eliquis presenting for evaluation of 2 weeks of left upper extremity swelling, redness, and warmth.  The patient started 2 weeks ago with swelling around his forearm and wrist.  He notes over the past 2 weeks he has had progressive swelling to the whole arm with associated redness, warmth, and discomfort.  He denies associated fevers and chills.  He denies trauma to the arm.  There is a healing  abrasion to the antecubital of the left arm which I asked if was before this all started.  He is uncertain but does believe he had a wound there prior to the symptoms.  He presents today as he has been taking his water pill as directed but continues to have swelling of the left arm and for further evaluation.      History provided by:  Patient   used: No        Review of Systems   Constitutional:  Negative for chills and fever.   Respiratory:  Negative for shortness of breath.    Cardiovascular:  Negative for chest pain.   Gastrointestinal:  Negative for abdominal pain, diarrhea and vomiting.   Musculoskeletal:  Positive for joint swelling (Left upper extremity swelling x 2 weeks). Negative for arthralgias, back pain and gait problem.   Skin:  Positive for color change (Redness to right upper extremity x 1 to 2 weeks) and wound (Healing abrasion to left forearm).   Neurological:  Negative for weakness and numbness.   All other systems reviewed and are negative.          Objective       ED Triage Vitals [11/07/24 0846]   Temperature Pulse Blood Pressure Respirations SpO2 Patient Position - Orthostatic VS   97.8 °F (36.6 °C) 97 160/73 18 98 % Lying      Temp Source Heart Rate Source BP Location FiO2 (%) Pain Score    Oral Monitor Right arm -- 3      Vitals      Date and Time Temp Pulse SpO2 Resp BP Pain Score FACES Pain Rating User   11/07/24 1100 -- 64 99 % 15 159/67 -- -- MI   11/07/24 0846 97.8 °F (36.6 °C) 97 98 % 18 160/73 3 -- CAC            Physical Exam  Vitals and nursing note reviewed.   Constitutional:       General: He is not in acute distress.     Appearance: Normal appearance. He is normal weight. He is not ill-appearing or toxic-appearing.   HENT:      Head: Normocephalic and atraumatic.      Nose: Nose normal.      Mouth/Throat:      Mouth: Mucous membranes are moist.      Pharynx: Oropharynx is clear.   Eyes:      Extraocular Movements: Extraocular movements intact.       Conjunctiva/sclera: Conjunctivae normal.   Cardiovascular:      Rate and Rhythm: Normal rate and regular rhythm.      Pulses:           Radial pulses are 2+ on the right side and 2+ on the left side.        Dorsalis pedis pulses are 2+ on the right side and 2+ on the left side.      Heart sounds: Normal heart sounds, S1 normal and S2 normal.   Pulmonary:      Effort: Pulmonary effort is normal. No respiratory distress.      Breath sounds: Normal breath sounds and air entry.   Musculoskeletal:         General: Normal range of motion.      Cervical back: Normal range of motion and neck supple.      Comments: Left upper extremity with pitting edema as well as redness, warmth.  Clear demarcation of redness and warmth at the upper bicep prior to the left shoulder joint.  Full range of motion of the left shoulder without difficulty.  Able to range the left elbow and left wrist without difficulty, tenderness, or pain.  Distal radial pulse 2+.  Full  strength.   Skin:     General: Skin is warm and dry.      Capillary Refill: Capillary refill takes less than 2 seconds.   Neurological:      General: No focal deficit present.      Mental Status: He is alert and oriented to person, place, and time. Mental status is at baseline.         Results Reviewed       Procedure Component Value Units Date/Time    Lactic acid [[id number]]  (Normal) Collected: 11/07/24 0921    Lab Status: Final result Specimen: Blood from Arm, Right Updated: 11/07/24 1002     LACTIC ACID 0.6 mmol/L     Narrative:      Result may be elevated if tourniquet was used during collection.    Comprehensive metabolic panel [[id number]]  (Abnormal) Collected: 11/07/24 0921    Lab Status: Final result Specimen: Blood from Arm, Right Updated: 11/07/24 1002     Sodium 135 mmol/L      Potassium 3.7 mmol/L      Chloride 104 mmol/L      CO2 28 mmol/L      ANION GAP 3 mmol/L      BUN 18 mg/dL      Creatinine 1.09 mg/dL      Glucose 119 mg/dL      Calcium 7.7 mg/dL       Corrected Calcium 9.0 mg/dL      AST 13 U/L      ALT 10 U/L      Alkaline Phosphatase 44 U/L      Total Protein 4.6 g/dL      Albumin 2.4 g/dL      Total Bilirubin 0.96 mg/dL      eGFR 62 ml/min/1.73sq m     Narrative:      National Kidney Disease Foundation guidelines for Chronic Kidney Disease (CKD):     Stage 1 with normal or high GFR (GFR > 90 mL/min/1.73 square meters)    Stage 2 Mild CKD (GFR = 60-89 mL/min/1.73 square meters)    Stage 3A Moderate CKD (GFR = 45-59 mL/min/1.73 square meters)    Stage 3B Moderate CKD (GFR = 30-44 mL/min/1.73 square meters)    Stage 4 Severe CKD (GFR = 15-29 mL/min/1.73 square meters)    Stage 5 End Stage CKD (GFR <15 mL/min/1.73 square meters)  Note: GFR calculation is accurate only with a steady state creatinine    Procalcitonin [[id number]]  (Abnormal) Collected: 11/07/24 0921    Lab Status: Final result Specimen: Blood from Arm, Right Updated: 11/07/24 1002     Procalcitonin 0.56 ng/ml     Protime-INR [[id number]]  (Normal) Collected: 11/07/24 0921    Lab Status: Final result Specimen: Blood from Arm, Right Updated: 11/07/24 0947     Protime 14.4 seconds      INR 1.07    Narrative:      INR Therapeutic Range    Indication                                             INR Range      Atrial Fibrillation                                               2.0-3.0  Hypercoagulable State                                    2.0.2.3  Left Ventricular Asist Device                            2.0-3.0  Mechanical Heart Valve                                  -    Aortic(with afib, MI, embolism, HF, LA enlargement,    and/or coagulopathy)                                     2.0-3.0 (2.5-3.5)     Mitral                                                             2.5-3.5  Prosthetic/Bioprosthetic Heart Valve               2.0-3.0  Venous thromboembolism (VTE: VT, PE        2.0-3.0    APTT [[id number]]  (Normal) Collected: 11/07/24 0921    Lab Status: Final result Specimen: Blood from Arm, Right  Updated: 11/07/24 0947     PTT 34 seconds     CBC and differential [[id number]]  (Abnormal) Collected: 11/07/24 0921    Lab Status: Final result Specimen: Blood from Arm, Right Updated: 11/07/24 0934     WBC 4.75 Thousand/uL      RBC 3.81 Million/uL      Hemoglobin 13.1 g/dL      Hematocrit 37.5 %      MCV 98 fL      MCH 34.4 pg      MCHC 34.9 g/dL      RDW 14.5 %      MPV 9.6 fL      Platelets 172 Thousands/uL      nRBC 0 /100 WBCs      Segmented % 74 %      Immature Grans % 0 %      Lymphocytes % 14 %      Monocytes % 11 %      Eosinophils Relative 1 %      Basophils Relative 0 %      Absolute Neutrophils 3.53 Thousands/µL      Absolute Immature Grans 0.02 Thousand/uL      Absolute Lymphocytes 0.65 Thousands/µL      Absolute Monocytes 0.50 Thousand/µL      Eosinophils Absolute 0.04 Thousand/µL      Basophils Absolute 0.01 Thousands/µL     Blood culture #2 [[id number]] Collected: 11/07/24 0921    Lab Status: In process Specimen: Blood from Arm, Right Updated: 11/07/24 0932    Blood culture #1 [[id number]] Collected: 11/07/24 0921    Lab Status: In process Specimen: Blood from Arm, Left Updated: 11/07/24 0932            No orders to display       ECG 12 Lead Documentation Only    Date/Time: 11/7/2024 9:47 AM    Performed by: LINWOOD Johansen  Authorized by: LINWOOD Johansen    Indications / Diagnosis:  Sepsis  ECG reviewed by me, the ED Provider: yes    Patient location:  ED  Previous ECG:     Previous ECG:  Compared to current    Comparison ECG info:  October 11, 2023    Similarity:  No change    Comparison to cardiac monitor: Yes    Interpretation:     Interpretation: non-specific    Rate:     ECG rate:  64    ECG rate assessment: normal    Rhythm:     Rhythm: sinus rhythm and paced    Pacing:     Capture:  Complete    Type of pacing:  Atrial  Ectopy:     Ectopy: none    QRS:     QRS axis:  Normal    QRS intervals:  Normal  Conduction:     Conduction: normal    ST segments:     ST segments:  Non-specific  T  waves:     T waves: non-specific    Comments:      Sinus rhythm, normal axis, normal intervals, nonstick T and ST abnormalities      ED Medication and Procedure Management   Prior to Admission Medications   Prescriptions Last Dose Informant Patient Reported? Taking?   apixaban (Eliquis) 5 mg  Self No No   Sig: Take 1 tablet (5 mg total) by mouth 2 (two) times a day   furosemide (LASIX) 40 mg tablet  Self Yes No   Sig: Take 40 mg by mouth daily   metoprolol succinate (TOPROL-XL) 50 mg 24 hr tablet   No No   Sig: TAKE 1 TABLET BY MOUTH EVERY DAY   tamsulosin (FLOMAX) 0.4 mg  Self Yes No   Sig: Take 0.4 mg by mouth daily with dinner      Facility-Administered Medications: None     Patient's Medications   Discharge Prescriptions    No medications on file     No discharge procedures on file.  ED SEPSIS DOCUMENTATION   Time reflects when diagnosis was documented in both MDM as applicable and the Disposition within this note       Time User Action Codes Description Comment    11/7/2024 11:22 AM Val Bean Add [L03.114] Left arm cellulitis            Initial Sepsis Screening       Row Name 11/07/24 4120                Is the patient's history suggestive of a new or worsening infection? Yes (Proceed)  -TS        Suspected source of infection soft tissue  -TS        Indicate SIRS criteria Tachycardia > 90 bpm  -TS        Are two or more of the above signs & symptoms of infection both present and new to the patient? No  -TS                  User Key  (r) = Recorded By, (t) = Taken By, (c) = Cosigned By      Initials Name Provider Type    TS LINWOOD Johansen Nurse Practitioner                       LINWOOD Johansen  11/07/24 3728

## 2024-11-08 ENCOUNTER — APPOINTMENT (INPATIENT)
Dept: NON INVASIVE DIAGNOSTICS | Facility: HOSPITAL | Age: 84
DRG: 603 | End: 2024-11-08
Payer: COMMERCIAL

## 2024-11-08 LAB
ANION GAP SERPL CALCULATED.3IONS-SCNC: 3 MMOL/L (ref 4–13)
ATRIAL RATE: 64 BPM
BUN SERPL-MCNC: 18 MG/DL (ref 5–25)
CALCIUM SERPL-MCNC: 7.6 MG/DL (ref 8.4–10.2)
CHLORIDE SERPL-SCNC: 107 MMOL/L (ref 96–108)
CO2 SERPL-SCNC: 27 MMOL/L (ref 21–32)
CREAT SERPL-MCNC: 1.08 MG/DL (ref 0.6–1.3)
ERYTHROCYTE [DISTWIDTH] IN BLOOD BY AUTOMATED COUNT: 14.6 % (ref 11.6–15.1)
GFR SERPL CREATININE-BSD FRML MDRD: 63 ML/MIN/1.73SQ M
GLUCOSE SERPL-MCNC: 110 MG/DL (ref 65–140)
HCT VFR BLD AUTO: 34.4 % (ref 36.5–49.3)
HGB BLD-MCNC: 11.7 G/DL (ref 12–17)
MCH RBC QN AUTO: 33.6 PG (ref 26.8–34.3)
MCHC RBC AUTO-ENTMCNC: 34 G/DL (ref 31.4–37.4)
MCV RBC AUTO: 99 FL (ref 82–98)
P AXIS: 77 DEGREES
PLATELET # BLD AUTO: 165 THOUSANDS/UL (ref 149–390)
PMV BLD AUTO: 9.7 FL (ref 8.9–12.7)
POTASSIUM SERPL-SCNC: 3.5 MMOL/L (ref 3.5–5.3)
PR INTERVAL: 170 MS
QRS AXIS: 70 DEGREES
QRSD INTERVAL: 120 MS
QT INTERVAL: 438 MS
QTC INTERVAL: 451 MS
RBC # BLD AUTO: 3.48 MILLION/UL (ref 3.88–5.62)
SODIUM SERPL-SCNC: 137 MMOL/L (ref 135–147)
T WAVE AXIS: 87 DEGREES
VENTRICULAR RATE: 64 BPM
WBC # BLD AUTO: 4.48 THOUSAND/UL (ref 4.31–10.16)

## 2024-11-08 PROCEDURE — 93010 ELECTROCARDIOGRAM REPORT: CPT | Performed by: INTERNAL MEDICINE

## 2024-11-08 PROCEDURE — 80048 BASIC METABOLIC PNL TOTAL CA: CPT | Performed by: INTERNAL MEDICINE

## 2024-11-08 PROCEDURE — 93971 EXTREMITY STUDY: CPT

## 2024-11-08 PROCEDURE — 85027 COMPLETE CBC AUTOMATED: CPT | Performed by: INTERNAL MEDICINE

## 2024-11-08 PROCEDURE — 99232 SBSQ HOSP IP/OBS MODERATE 35: CPT | Performed by: INTERNAL MEDICINE

## 2024-11-08 PROCEDURE — 93971 EXTREMITY STUDY: CPT | Performed by: SURGERY

## 2024-11-08 PROCEDURE — 99222 1ST HOSP IP/OBS MODERATE 55: CPT | Performed by: PHYSICIAN ASSISTANT

## 2024-11-08 RX ADMIN — METOPROLOL SUCCINATE 50 MG: 50 TABLET, EXTENDED RELEASE ORAL at 10:05

## 2024-11-08 RX ADMIN — CEFAZOLIN SODIUM 2000 MG: 2 SOLUTION INTRAVENOUS at 05:24

## 2024-11-08 RX ADMIN — CEFAZOLIN SODIUM 2000 MG: 2 SOLUTION INTRAVENOUS at 14:01

## 2024-11-08 RX ADMIN — FUROSEMIDE 40 MG: 40 TABLET ORAL at 10:05

## 2024-11-08 RX ADMIN — CEFAZOLIN SODIUM 2000 MG: 2 SOLUTION INTRAVENOUS at 20:46

## 2024-11-08 RX ADMIN — TAMSULOSIN HYDROCHLORIDE 0.4 MG: 0.4 CAPSULE ORAL at 16:35

## 2024-11-08 RX ADMIN — APIXABAN 5 MG: 5 TABLET, FILM COATED ORAL at 20:45

## 2024-11-08 RX ADMIN — APIXABAN 5 MG: 5 TABLET, FILM COATED ORAL at 10:05

## 2024-11-08 NOTE — ASSESSMENT & PLAN NOTE
Presented with left upper extremity swelling and redness.  Patient reported he usually has intermittent swelling in his hands however he noticed redness and worsening swelling in the beginning of the week.  Fell about 3 weeks ago and had skin tear however it was healing well.   Not meeting sepsis criteria  Denied any spider bites.  Start Ancef  Ace wrap  Extremity elevation  Persistent swelling-check left upper extremity Doppler

## 2024-11-08 NOTE — PROGRESS NOTES
Progress Note - Hospitalist   Name: Daniel Martins 84 y.o. male I MRN: [id number]  Unit/Bed#: -01 I Date of Admission: 11/7/2024   Date of Service: 11/8/2024 I Hospital Day: 1    Assessment & Plan  Cellulitis of left upper extremity  Presented with left upper extremity swelling and redness.  Patient reported he usually has intermittent swelling in his hands however he noticed redness and worsening swelling in the beginning of the week.  Fell about 3 weeks ago and had skin tear however it was healing well.   Not meeting sepsis criteria  Denied any spider bites.  Start Ancef  Ace wrap  Extremity elevation  Persistent swelling-check left upper extremity Doppler  Hypertension  Continue metoprolol 50 mg and Lasix  Monitor  Atrial fibrillation (HCC)  History of A-fib  On Toprol and Eliquis  Continue  Bilateral leg edema  History of bilateral lower extremity edema  Continue Lasix    VTE  Prophylaxis:   Pharmacologic: in place  Mechanical VTE Prophylaxis in Place: Yes    Patient Centered Rounds: I have performed bedside rounds with nursing staff today.    Discussions with Specialists or Other Care Team Provider: case management    Education and Discussions with Family / Patient: patient, declined call    Mobility:   Basic Mobility Inpatient Raw Score: 24  JH-HLM Goal: 8: Walk 250 feet or more  JH-HLM Achieved: 8: Walk 250 feet ot more  JH-HLM Goal achieved. Continue to encourage appropriate mobility.    Total Time Spent on Date of Encounter in care of patient: 45 mins. This time was spent on one or more of the following: performing physical exam; counseling and coordination of care; obtaining or reviewing history; documenting in the medical record; reviewing/ordering tests, medications or procedures; communicating with other healthcare professionals and discussing with patient's family/caregivers.      Current Length of Stay: 1 day(s)    Current Patient Status: Inpatient        Code Status: Level 1 - Full  Code    Discharge Plan: Pt will require continued inpatient hospitalization.    Subjective:   States still has swelling.     Patient is seen and examined at bedside.  All other ROS are negative.    Objective:     Vitals:   Temp (24hrs), Av.4 °F (36.3 °C), Min:97 °F (36.1 °C), Max:97.7 °F (36.5 °C)    Temp:  [97 °F (36.1 °C)-97.7 °F (36.5 °C)] 97.5 °F (36.4 °C)  HR:  [60-74] 67  Resp:  [20] 20  BP: (105-154)/(63-74) 115/68  SpO2:  [95 %-98 %] 98 %  Body mass index is 21.62 kg/m².     Input and Output Summary (last 24 hours):       Intake/Output Summary (Last 24 hours) at 2024 1314  Last data filed at 2024 0601  Gross per 24 hour   Intake 240 ml   Output --   Net 240 ml       Physical Exam:       GEN: No acute distress, comfortable  HEEENT: No JVD, PERRLA, no scleral icterus  RESP: Lungs clear to auscultation bilaterally  CV: RRR, +s1/s2   ABD: SOFT NON TENDER, POSITIVE BOWEL SOUNDS, NO DISTENTION  PSYCH: CALM  NEURO: Mentation baseline, NO FOCAL DEFICITS  SKIN: NO RASH  EXTREM: Left upper extremity swollen, erythematous, not warm    Additional Data:     Labs:    Results from last 7 days   Lab Units 24  0523 24  0921   WBC Thousand/uL 4.48 4.75   HEMOGLOBIN g/dL 11.7* 13.1   HEMATOCRIT % 34.4* 37.5   PLATELETS Thousands/uL 165 172   SEGS PCT %  --  74   LYMPHO PCT %  --  14   MONO PCT %  --  11   EOS PCT %  --  1     Results from last 7 days   Lab Units 24  0523 24  0921   SODIUM mmol/L 137 135   POTASSIUM mmol/L 3.5 3.7   CHLORIDE mmol/L 107 104   CO2 mmol/L 27 28   BUN mg/dL 18 18   CREATININE mg/dL 1.08 1.09   ANION GAP mmol/L 3* 3*   CALCIUM mg/dL 7.6* 7.7*   ALBUMIN g/dL  --  2.4*   TOTAL BILIRUBIN mg/dL  --  0.96   ALK PHOS U/L  --  44   ALT U/L  --  10   AST U/L  --  13   GLUCOSE RANDOM mg/dL 110 119     Results from last 7 days   Lab Units 24  0921   INR  1.07             Results from last 7 days   Lab Units 24  0921   LACTIC ACID mmol/L 0.6   PROCALCITONIN  ng/ml 0.56*       Lines/Drains:  Invasive Devices       Peripheral Intravenous Line  Duration             Peripheral IV 11/07/24 Right Antecubital 1 day                    Telemetry:        * I Have Reviewed All Lab Data Listed Above.           Imaging:     Results for orders placed during the hospital encounter of 10/11/23    XR Trauma chest portable    Narrative  CHEST    INDICATION:   TRAUMA.    COMPARISON:  None    EXAM PERFORMED/VIEWS:  XR CHEST PORTABLE      FINDINGS:    Lung volumes are within normal limits.    Lungs are clear.    No effusion or pneumothorax.    Heart, mediastinal and hilar structures are within normal limits. Left-sided dual-chamber pacemaker.    No acute osseous or soft tissue pathology.    Impression  No acute cardiopulmonary findings.            Workstation performed: [id number]    No results found for this or any previous visit.      *I have reviewed all imaging reports listed above      Recent Cultures (last 7 days):     Results from last 7 days   Lab Units 11/07/24  0921   BLOOD CULTURE  Received in Microbiology Lab. Culture in Progress.  Received in Microbiology Lab. Culture in Progress.       Last 24 Hours Medication List:   Current Facility-Administered Medications   Medication Dose Route Frequency Provider Last Rate    acetaminophen  650 mg Oral Q6H PRN Rhiannon Sanchez MD      apixaban  5 mg Oral BID Rhiannon Sanchez MD      cefazolin  2,000 mg Intravenous Q8H Rhiannon Sanchez MD 2,000 mg (11/08/24 0524)    furosemide  40 mg Oral Daily Rhiannon Sanchez MD      metoprolol succinate  50 mg Oral Daily Rhiannon Sanchez MD      tamsulosin  0.4 mg Oral Daily With Dinner Rhiannon Sanchez MD          Today, Patient Was Seen By: Doc Swenson MD    ** Please Note: Dictation voice to text software may have been used in the creation of this document. **

## 2024-11-08 NOTE — PLAN OF CARE
Problem: PAIN - ADULT  Goal: Verbalizes/displays adequate comfort level or baseline comfort level  Description: Interventions:  - Encourage patient to monitor pain and request assistance  - Assess pain using appropriate pain scale  - Administer analgesics based on type and severity of pain and evaluate response  - Implement non-pharmacological measures as appropriate and evaluate response  - Consider cultural and social influences on pain and pain management  - Notify physician/advanced practitioner if interventions unsuccessful or patient reports new pain  Outcome: Progressing     Problem: INFECTION - ADULT  Goal: Absence or prevention of progression during hospitalization  Description: INTERVENTIONS:  - Assess and monitor for signs and symptoms of infection  - Monitor lab/diagnostic results  - Monitor all insertion sites, i.e. indwelling lines, tubes, and drains  - Monitor endotracheal if appropriate and nasal secretions for changes in amount and color  - [place] appropriate cooling/warming therapies per order  - Administer medications as ordered  - Instruct and encourage patient and family to use good hand hygiene technique  - Identify and instruct in appropriate isolation precautions for identified infection/condition  Outcome: Progressing  Goal: Absence of fever/infection during neutropenic period  Description: INTERVENTIONS:  - Monitor WBC    Outcome: Progressing     Problem: SAFETY ADULT  Goal: Maintain or return to baseline ADL function  Description: INTERVENTIONS:  -  Assess patient's ability to carry out ADLs; assess patient's baseline for ADL function and identify physical deficits which impact ability to perform ADLs (bathing, care of mouth/teeth, toileting, grooming, dressing, etc.)  - Assess/evaluate cause of self-care deficits   - Assess range of motion  - Assess patient's mobility; develop plan if impaired  - Assess patient's need for assistive devices and provide as appropriate  - Encourage  maximum independence but intervene and supervise when necessary  - Involve family in performance of ADLs  - Assess for home care needs following discharge   - Consider OT consult to assist with ADL evaluation and planning for discharge  - Provide patient education as appropriate  Outcome: Progressing     Problem: DISCHARGE PLANNING  Goal: Discharge to home or other facility with appropriate resources  Description: INTERVENTIONS:  - Identify barriers to discharge w/patient and caregiver  - Arrange for needed discharge resources and transportation as appropriate  - Identify discharge learning needs (meds, wound care, etc.)  - Arrange for interpretive services to assist at discharge as needed  - Refer to Case Management Department for coordinating discharge planning if the patient needs post-hospital services based on physician/advanced practitioner order or complex needs related to functional status, cognitive ability, or social support system  Outcome: Progressing     Problem: Knowledge Deficit  Goal: Patient/family/caregiver demonstrates understanding of disease process, treatment plan, medications, and discharge instructions  Description: Complete learning assessment and assess knowledge base.  Interventions:  - Provide teaching at level of understanding  - Provide teaching via preferred learning methods  Outcome: Progressing

## 2024-11-08 NOTE — CONSULTS
Consultation - Surgery-General   Name: Daniel Martins 84 y.o. male I MRN: [id number]  Unit/Bed#: -01 I Date of Admission: 11/7/2024   Date of Service: 11/8/2024 I Hospital Day: 1   Inpatient consult to Acute Care Surgery  Consult performed by: Maximilian Estes PA-C  Consult ordered by: Doc Swenson MD      Physician Requesting Evaluation: Doc Swenson MD   Reason for Evaluation / Principal Problem: left upper extremity swelling      Assessment & Plan  Cellulitis of left upper extremity  Left upper extremity duplex preliminary results reviewed  No sign of DVT or thrombophlebitis  No fluctuance or signs of collection that would need drained upon my exam    Continue Lasix and Ancef  ACE wrap replaced for compression  Elevate LUE as much as possible  Ice to left upper extremity as needed  High protein diet and/or Ensure supplementation      Hypertension    Atrial fibrillation (HCC)  On eliquis     Bilateral leg edema      Pacemaker  Implanted October 2019    Surgery-General service signing off.      History of Present Illness   Daniel Martins is a 84 y.o. male with history of Lyme disease, HTN, and a fib on eliquis whom presented to the ED yesterday for LUE swelling and redness. Approx 3 weeks ago patient had a skin tear in the antecubital fossa of the left arm secondary to a fall however he reports that has healed nicely. Denies any other bites or wounds. For several months now patient has reported intermittent upper extremity swelling that spontaneously resolves. He said over the last couple days the redness and tenderness started and the fluid in his left arm became severe, so much so it was dripping and creating puddles.   Denies any pain with flexion/extension of the elbow or wrist. The redness and swelling have improved over the past 24 hrs per patient.  Patient has been compliant with his eliquis. No history of DVT.   Patient does report taking a commercial flight last week.  Denies any lymph node  dissection surgery, upper extremity AV fistula, recent central lines, etc.  ECHO in June 2024 was relatively unremarkable with mild regurgitation at all 4 valves and EF of 55%      Review of Systems   Constitutional:  Negative for appetite change, chills, fatigue and fever.   HENT:  Negative for congestion, rhinorrhea, sneezing, sore throat and trouble swallowing.    Eyes: Negative.    Respiratory:  Negative for cough, chest tightness, shortness of breath and wheezing.    Cardiovascular:  Positive for leg swelling. Negative for chest pain and palpitations.   Gastrointestinal:  Negative for abdominal pain, blood in stool, diarrhea, nausea and vomiting.   Genitourinary:  Positive for frequency. Negative for difficulty urinating, dysuria, flank pain and hematuria.   Musculoskeletal: Negative.    Skin:  Positive for color change. Negative for rash and wound.   Neurological:  Negative for dizziness, syncope, weakness, light-headedness and headaches.   Hematological: Negative.    Psychiatric/Behavioral: Negative.       I have reviewed the patient's PMH, PSH, Social History, Family History, Meds, and Allergies    Objective :  Temp:  [97.3 °F (36.3 °C)-97.7 °F (36.5 °C)] 97.3 °F (36.3 °C)  HR:  [65-74] 65  BP: (115-144)/(68-83) 144/83  SpO2:  [97 %-100 %] 100 %  O2 Device: None (Room air)      Physical Exam  Constitutional:       General: He is awake. He is not in acute distress.     Appearance: Normal appearance. He is well-developed, well-groomed and underweight.      Interventions: He is not intubated.  HENT:      Head: Normocephalic and atraumatic. No right periorbital erythema or left periorbital erythema.   Eyes:      General: No scleral icterus.  Cardiovascular:      Rate and Rhythm: Normal rate and regular rhythm.   Pulmonary:      Effort: Pulmonary effort is normal. No tachypnea or bradypnea. He is not intubated.      Breath sounds: Normal breath sounds. No stridor or transmitted upper airway sounds.   Abdominal:       General: Abdomen is flat.      Palpations: Abdomen is soft.      Tenderness: There is no abdominal tenderness.   Musculoskeletal:      Right lower leg: Edema present.      Left lower le+ Edema present.   Skin:     General: Skin is warm and dry.      Coloration: Skin is ashen. Skin is not cyanotic.      Findings: Erythema (LUE) present. No abscess.   Neurological:      General: No focal deficit present.      Mental Status: He is alert.      GCS: GCS eye subscore is 4. GCS verbal subscore is 5. GCS motor subscore is 6.      Motor: Motor function is intact.   Psychiatric:         Attention and Perception: Attention normal.         Mood and Affect: Mood normal.         Speech: Speech normal.         Behavior: Behavior normal. Behavior is cooperative.         LUE pitting edema        Lab Results: I have reviewed the following results:  Recent Labs     24  0924  0523   WBC 4.75 4.48   HGB 13.1 11.7*   HCT 37.5 34.4*    165   SODIUM 135 137   K 3.7 3.5    107   CO2 28 27   BUN 18 18   CREATININE 1.09 1.08   GLUC 119 110   AST 13  --    ALT 10  --    ALB 2.4*  --    TBILI 0.96  --    ALKPHOS 44  --    PTT 34  --    INR 1.07  --    LACTICACID 0.6  --        Imaging Results Review: I reviewed radiology reports from this admission including: Ultrasound(s).  Other Study Results Review: No additional pertinent studies reviewed.    VTE Pharmacologic Prophylaxis: VTE covered by:  apixaban, Oral, 5 mg at 24 1005     VTE Mechanical Prophylaxis: sequential compression device    Administrative Statements   I have spent a total time of 50 minutes in caring for this patient on the day of the visit/encounter including Diagnostic results, Instructions for management, Patient and family education, Risk factor reductions, Counseling / Coordination of care, Documenting in the medical record, Reviewing / ordering tests, medicine, procedures  , Obtaining or reviewing history  , and Communicating with  other healthcare professionals .

## 2024-11-08 NOTE — ASSESSMENT & PLAN NOTE
Left upper extremity duplex preliminary results reviewed  No sign of DVT or thrombophlebitis  No fluctuance or signs of collection that would need drained upon my exam    Continue Lasix and Ancef  ACE wrap replaced for compression  Elevate LUE as much as possible  Ice to left upper extremity as needed  High protein diet and/or Ensure supplementation

## 2024-11-08 NOTE — UTILIZATION REVIEW
Initial Clinical Review    Admission: Date/Time/Statement:   Admission Orders (From admission, onward)       Ordered        11/07/24 1123  INPATIENT ADMISSION  Once                          Orders Placed This Encounter   Procedures    INPATIENT ADMISSION     Standing Status:   Standing     Number of Occurrences:   1     Order Specific Question:   Level of Care     Answer:   Med Surg [16]     Order Specific Question:   Estimated length of stay     Answer:   More than 2 Midnights     Order Specific Question:   Certification     Answer:   I certify that inpatient services are medically necessary for this patient for a duration of greater than two midnights. See H&P and MD Progress Notes for additional information about the patient's course of treatment.     ED Arrival Information       Expected   -    Arrival   11/7/2024 08:39    Acuity   Urgent              Means of arrival   Walk-In    Escorted by   Self    Service   Hospitalist    Admission type   Emergency              Arrival complaint   Lt arm swelling             Chief Complaint   Patient presents with    Arm Swelling     Patient presents to the ER with left arm swelling.       Initial Presentation: 84 y.o. male presents to ED from home with LUE  edema and redness.  States he fell  on left arm 3 weeks  prior to admission, had a  skin tear which healed well.  Has  intermittent B/L  hand edema,  but  noticed  worsening  LUE  swelling and redness, mostly  at the elbow  a  few days  ago. PMH  is  SSS, S/P  pacemaker, HTN and Afib.  Not meeting sepsis criteria.  Admit  Ip with  Cellulitis  LUE  and plan is   [place],  elevate extremity, monitor labs  and continue home meds.                Anticipated Length of Stay/Certification Statement: Patient will be admitted on an inpatient basis with an anticipated length of stay of greater than 2 midnights secondary to cellulitis, upper extremity swelling .       Date:   11/8   Day 2:   Remains on   [place].  Still with swelling   LUE.     LUE  swollen, erythematous, no warmth on exam.  Continue  ace wrap/eleavtion  LUE.       ED Treatment-Medication Administration from 11/07/2024 0839 to 11/07/2024 1359         Date/Time Order Dose Route Action     11/07/2024 0931 cefepime (MAXIPIME) IVPB (premix in dextrose) 2,000 mg 50 mL 2,000 mg Intravenous New Bag     11/07/2024 1241 apixaban (ELIQUIS) tablet 5 mg 5 mg Oral Given     11/07/2024 1241 furosemide (LASIX) tablet 40 mg 40 mg Oral Given     11/07/2024 1241 metoprolol succinate (TOPROL-XL) 24 hr tablet 50 mg 50 mg Oral Given            Scheduled Medications:  apixaban, 5 mg, Oral, BID  cefazolin, 2,000 mg, Intravenous, Q8H  furosemide, 40 mg, Oral, Daily  metoprolol succinate, 50 mg, Oral, Daily  tamsulosin, 0.4 mg, Oral, Daily With Dinner      Continuous IV Infusions:     PRN Meds:  acetaminophen, 650 mg, Oral, Q6H PRN      ED Triage Vitals [11/07/24 0846]   Temperature Pulse Respirations Blood Pressure SpO2 Pain Score   97.8 °F (36.6 °C) 97 18 160/73 98 % 3     Weight (last 2 days)       Date/Time Weight    11/07/24 0846 70.3 (155)            Vital Signs (last 3 days)       Date/Time Temp Pulse Resp BP MAP (mmHg) SpO2 O2 Flow Rate (L/min) O2 Device Patient Position - Orthostatic VS CIWA-Ar Total Pain    11/08/24 07:18:21 97.5 °F (36.4 °C) 67 -- 115/68 84 98 % -- -- -- -- --    11/07/24 22:08:19 97.5 °F (36.4 °C) 74 -- 124/71 89 97 % -- None (Room air) Lying -- --    11/07/24 2000 -- -- -- -- -- -- -- -- -- -- No Pain    11/07/24 1758 97.7 °F (36.5 °C) -- -- -- -- -- -- -- -- 0 --    11/07/24 1755 -- -- -- -- -- 98 % 0 L/min None (Room air) -- -- --    11/07/24 15:26:33 97.2 °F (36.2 °C) 63 -- 105/63 77 97 % -- None (Room air) Sitting -- --    11/07/24 14:05:50 97 °F (36.1 °C) 67 -- 131/74 93 97 % -- None (Room air) Sitting -- --    11/07/24 1330 -- 60 20 154/67 97 95 % -- None (Room air) Lying -- 3    11/07/24 1248 -- 60 18 179/79 -- 99 % -- None (Room air) Lying -- 3    11/07/24 1200 -- 60  17 154/68 98 97 % -- None (Room air) -- -- --    11/07/24 1130 -- 89 17 168/92 123 98 % -- None (Room air) -- -- --    11/07/24 1100 -- 64 15 159/67 97 99 % -- -- -- -- --    11/07/24 0846 97.8 °F (36.6 °C) 97 18 160/73 -- 98 % -- -- Lying -- 3              Pertinent Labs/Diagnostic Test Results:   Radiology:    Results from last 7 days   Lab Units 11/08/24  0523 11/07/24  0921   WBC Thousand/uL 4.48 4.75   HEMOGLOBIN g/dL 11.7* 13.1   HEMATOCRIT % 34.4* 37.5   PLATELETS Thousands/uL 165 172   TOTAL NEUT ABS Thousands/µL  --  3.53         Results from last 7 days   Lab Units 11/08/24  0523 11/07/24  0921   SODIUM mmol/L 137 135   POTASSIUM mmol/L 3.5 3.7   CHLORIDE mmol/L 107 104   CO2 mmol/L 27 28   ANION GAP mmol/L 3* 3*   BUN mg/dL 18 18   CREATININE mg/dL 1.08 1.09   EGFR ml/min/1.73sq m 63 62   CALCIUM mg/dL 7.6* 7.7*     Results from last 7 days   Lab Units 11/07/24  0921   AST U/L 13   ALT U/L 10   ALK PHOS U/L 44   TOTAL PROTEIN g/dL 4.6*   ALBUMIN g/dL 2.4*   TOTAL BILIRUBIN mg/dL 0.96         Results from last 7 days   Lab Units 11/08/24  0523 11/07/24  0921   GLUCOSE RANDOM mg/dL 110 119           Results from last 7 days   Lab Units 11/07/24  0921   PROTIME seconds 14.4   INR  1.07   PTT seconds 34         Results from last 7 days   Lab Units 11/07/24  0921   PROCALCITONIN ng/ml 0.56*     Results from last 7 days   Lab Units 11/07/24  0921   LACTIC ACID mmol/L 0.6         Results from last 7 days   Lab Units 11/07/24  0921   BLOOD CULTURE  Received in Microbiology Lab. Culture in Progress.  Received in Microbiology Lab. Culture in Progress.               Present on Admission:   Hypertension   Atrial fibrillation (HCC)   Bilateral leg edema      Admitting Diagnosis: Arm swelling [M79.89]  Left arm cellulitis [L03.114]  Age/Sex: 84 y.o. male    Network Utilization Review Department  ATTENTION: Please call with any questions or concerns to [phone] and carefully listen to the prompts so that you are  directed to the right person. All voicemails are confidential.   For Discharge needs, contact Care Management DC Support Team at [phone] opt. 2  Send all requests for admission clinical reviews, approved or denied determinations and any other requests to dedicated fax number below belonging to the [facility] where the patient is receiving treatment. List of dedicated fax numbers for the Facilities:  FACILITY NAME UR FAX NUMBER   ADMISSION DENIALS (Administrative/Medical Necessity) [phone]   DISCHARGE SUPPORT TEAM (NETWORK) [phone]   PARENT CHILD HEALTH (Maternity/NICU/Pediatrics) [phone]   [facility] [phone]   [facility] [phone]   [facility] [phone]   [facility] [phone]   [facility] [phone]   [facility] [phone]   [facility] [phone]   [facility] [phone]   [facility] [phone]   [facility] [phone]   [facility] [phone]   [facility] [phone]

## 2024-11-08 NOTE — PLAN OF CARE
Problem: Potential for Falls  Goal: Patient will remain free of falls  Description: INTERVENTIONS:  - Educate patient/family on patient safety including physical limitations  - Instruct patient to call for assistance with activity   - Consult OT/PT to assist with strengthening/mobility   - Keep Call bell within reach  - Keep bed low and locked with side rails adjusted as appropriate  - Keep care items and personal belongings within reach  - Initiate and maintain comfort rounds  - Make Fall Risk Sign visible to staff  - Offer Toileting every 2 Hours, in advance of need  - Initiate/Maintain call bell alarm  - Obtain necessary fall risk management equipment: call bell usage  - Apply yellow socks and bracelet for high fall risk patients  - Consider moving patient to room near nurses station  11/8/2024 1328 by Daniel Caldera LPN  Outcome: Progressing  11/8/2024 1325 by Daniel Caldera LPN  Outcome: Progressing     Problem: INFECTION - ADULT  Goal: Absence or prevention of progression during hospitalization  Description: INTERVENTIONS:  - Assess and monitor for signs and symptoms of infection  - Monitor lab/diagnostic results  - Monitor all insertion sites, i.e. indwelling lines, tubes, and drains  - Monitor endotracheal if appropriate and nasal secretions for changes in amount and color  - [place] appropriate cooling/warming therapies per order  - Administer medications as ordered  - Instruct and encourage patient and family to use good hand hygiene technique  - Identify and instruct in appropriate isolation precautions for identified infection/condition  11/8/2024 1328 by Daniel Caldera LPN  Outcome: Progressing  11/8/2024 1325 by Daniel Caldera LPN  Outcome: Progressing  Goal: Absence of fever/infection during neutropenic period  Description: INTERVENTIONS:  - Monitor WBC    11/8/2024 1328 by Daniel Caldera LPN  Outcome: Progressing  11/8/2024 1325 by Daniel Caldera LPN  Outcome: Progressing

## 2024-11-08 NOTE — PLAN OF CARE
Problem: INFECTION - ADULT  Goal: Absence or prevention of progression during hospitalization  Description: INTERVENTIONS:  - Assess and monitor for signs and symptoms of infection  - Monitor lab/diagnostic results  - Monitor all insertion sites, i.e. indwelling lines, tubes, and drains  - Monitor endotracheal if appropriate and nasal secretions for changes in amount and color  - [place] appropriate cooling/warming therapies per order  - Administer medications as ordered  - Instruct and encourage patient and family to use good hand hygiene technique  - Identify and instruct in appropriate isolation precautions for identified infection/condition  11/7/2024 2014 by Rayshawn Nye RN  Outcome: Progressing  11/7/2024 2012 by Rayshawn Nye RN  Outcome: Progressing    Goal: Maintain or return to baseline ADL function  Description: INTERVENTIONS:  -  Assess patient's ability to carry out ADLs; assess patient's baseline for ADL function and identify physical deficits which impact ability to perform ADLs (bathing, care of mouth/teeth, toileting, grooming, dressing, etc.)  - Assess/evaluate cause of self-care deficits   - Assess range of motion  - Assess patient's mobility; develop plan if impaired  - Assess patient's need for assistive devices and provide as appropriate  - Encourage maximum independence but intervene and supervise when necessary  - Involve family in performance of ADLs  - Assess for home care needs following discharge   - Consider OT consult to assist with ADL evaluation and planning for discharge  - Provide patient education as appropriate  11/7/2024 2014 by Rayshawn Nye RN  Outcome: Progressing  11/7/2024 2012 by Rayshawn Nye RN  Outcome: Progressing     Problem: DISCHARGE PLANNING  Goal: Discharge to home or other facility with appropriate resources  Description: INTERVENTIONS:  - Identify barriers to discharge w/patient and caregiver  - Arrange for needed discharge resources and transportation as  appropriate  - Identify discharge learning needs (meds, wound care, etc.)  - Arrange for interpretive services to assist at discharge as needed  - Refer to Case Management Department for coordinating discharge planning if the patient needs post-hospital services based on physician/advanced practitioner order or complex needs related to functional status, cognitive ability, or social support system  11/7/2024 2014 by Rayshawn Nye RN  Outcome: Progressing  11/7/2024 2012 by Rayshawn Nye RN  Outcome: Progressing     Problem: Knowledge Deficit  Goal: Patient/family/caregiver demonstrates understanding of disease process, treatment plan, medications, and discharge instructions  Description: Complete learning assessment and assess knowledge base.  Interventions:  - Provide teaching at level of understanding  - Provide teaching via preferred learning methods  11/7/2024 2014 by Rayshawn Nye RN  Outcome: Progressing  11/7/2024 2012 by Rayshawn Nye RN  Outcome: Progressing

## 2024-11-08 NOTE — PLAN OF CARE
Problem: Potential for Falls  Goal: Patient will remain free of falls  Description: INTERVENTIONS:  - Educate patient/family on patient safety including physical limitations  - Instruct patient to call for assistance with activity   - Consult OT/PT to assist with strengthening/mobility   - Keep Call bell within reach  - Keep bed low and locked with side rails adjusted as appropriate  - Keep care items and personal belongings within reach  - Initiate and maintain comfort rounds  - Make Fall Risk Sign visible to staff  - Offer Toileting every 2 Hours, in advance of need  - Initiate/Maintain call bell alarm  - Obtain necessary fall risk management equipment: call bell usage  - Apply yellow socks and bracelet for high fall risk patients  - Consider moving patient to room near nurses station  Outcome: Progressing     Problem: PAIN - ADULT  Goal: Verbalizes/displays adequate comfort level or baseline comfort level  Description: Interventions:  - Encourage patient to monitor pain and request assistance  - Assess pain using appropriate pain scale  - Administer analgesics based on type and severity of pain and evaluate response  - Implement non-pharmacological measures as appropriate and evaluate response  - Consider cultural and social influences on pain and pain management  - Notify physician/advanced practitioner if interventions unsuccessful or patient reports new pain  Outcome: Progressing     Problem: INFECTION - ADULT  Goal: Absence or prevention of progression during hospitalization  Description: INTERVENTIONS:  - Assess and monitor for signs and symptoms of infection  - Monitor lab/diagnostic results  - Monitor all insertion sites, i.e. indwelling lines, tubes, and drains  - Monitor endotracheal if appropriate and nasal secretions for changes in amount and color  - [place] appropriate cooling/warming therapies per order  - Administer medications as ordered  - Instruct and encourage patient and family to use good  hand hygiene technique  - Identify and instruct in appropriate isolation precautions for identified infection/condition  Outcome: Progressing  Goal: Absence of fever/infection during neutropenic period  Description: INTERVENTIONS:  - Monitor WBC    Outcome: Progressing

## 2024-11-08 NOTE — CASE MANAGEMENT
Case Management Assessment & Discharge Planning Note    Patient name Daniel Martins  Location /-01 MRN [id number]  : 1940 Date 2024       Current Admission Date: 2024  Current Admission Diagnosis:Cellulitis of left upper extremity   Patient Active Problem List    Diagnosis Date Noted Date Diagnosed    Cellulitis of left upper extremity 2024     Bilateral leg edema 2024     Aneurysm of ascending aorta without rupture (HCC) 2023     Atrial fibrillation (HCC) 2023     Sick sinus syndrome (HCC) 2023     Pacemaker 2023     Hypertension        LOS (days): 1  Geometric Mean LOS (GMLOS) (days): 3.1  Days to GMLOS:1.9     OBJECTIVE:    Risk of Unplanned Readmission Score: 9.68         Current admission status: Inpatient       Preferred Pharmacy:   [id number] - [address] [address]  Phone: [phone] Fax: [phone]    Primary Care Provider: Bacilio Estrada DO    Primary Insurance: BLUE CROSS [employer] REP  Secondary Insurance:     ASSESSMENT:  Active Health Care Proxies    There are no active Health Care Proxies on file.       Advance Directives  Does patient have a Health Care POA?: Yes  Does patient have Advance Directives?: Yes  Advance Directives: Living will, Power of  for health care  Primary Contact: Paulina Martins, wife         Readmission Root Cause  30 Day Readmission: No    Patient Information  Admitted from:: Home  Mental Status: Alert  During Assessment patient was accompanied by: Not accompanied during assessment  Assessment information provided by:: Patient  Primary Caregiver: Self  Support Systems: Self, Spouse/significant other, Family members, Friend  County of Residence: [place]  What city do you live in?: [place]  Home entry access options. Select all that apply.: No steps to enter home  Type of Current Residence: 2 story home  Upon entering residence, is there a  bedroom on the main floor (no further steps)?: Yes  Upon entering residence, is there a bathroom on the main floor (no further steps)?: Yes  Living Arrangements: Lives w/ Spouse/significant other    Activities of Daily Living Prior to Admission  Functional Status: Independent  Completes ADLs independently?: Yes  Ambulates independently?: Yes  Does patient use assisted devices?: No  Does patient currently own DME?: No  Does patient have a history of Outpatient Therapy (PT/OT)?: No  Does the patient have a history of Short-Term Rehab?: No  Does patient have a history of HHC?: No  Does patient currently have HHC?: No         Patient Information Continued  Income Source: Pension/custodial  Does patient have prescription coverage?: Yes  Does patient receive dialysis treatments?: No  Does patient have a history of substance abuse?: No  Does patient have a history of Mental Health Diagnosis?: No         Means of Transportation  Means of Transport to Appts:: Drives Self          DISCHARGE DETAILS:    Discharge planning discussed with:: Patient  Freedom of Choice: Yes     CM contacted family/caregiver?: No- see comments (Pt is alert and oriented)  Were Treatment Team discharge recommendations reviewed with patient/caregiver?: Yes  Did patient/caregiver verbalize understanding of patient care needs?: Yes  Were patient/caregiver advised of the risks associated with not following Treatment Team discharge recommendations?: Yes         Requested Home Health Care         Is the patient interested in HHC at discharge?: No    DME Referral Provided  Referral made for DME?: No              Treatment Team Recommendation: Home  Discharge Destination Plan:: Home                                         Additional Comments: CM met with pt to discuss role of CM and any needs prior to discharge. CM lives w/ spouse in 2SH w/ 0 CHATA and 2nd flr set up. Indp PTA. No DME. Pt is retired and drives. Pt prefers to use [facility] in [place].No  hx STR/OP PT/HH/DA/MH. Pt will drive himself home at discharge.

## 2024-11-09 VITALS
TEMPERATURE: 97.7 F | OXYGEN SATURATION: 100 % | BODY MASS INDEX: 21.7 KG/M2 | HEIGHT: 71 IN | HEART RATE: 60 BPM | SYSTOLIC BLOOD PRESSURE: 150 MMHG | WEIGHT: 155 LBS | DIASTOLIC BLOOD PRESSURE: 77 MMHG | RESPIRATION RATE: 20 BRPM

## 2024-11-09 LAB
ANION GAP SERPL CALCULATED.3IONS-SCNC: 4 MMOL/L (ref 4–13)
BASOPHILS # BLD AUTO: 0.03 THOUSANDS/ÂΜL (ref 0–0.1)
BASOPHILS NFR BLD AUTO: 1 % (ref 0–1)
BUN SERPL-MCNC: 14 MG/DL (ref 5–25)
CALCIUM SERPL-MCNC: 7.7 MG/DL (ref 8.4–10.2)
CHLORIDE SERPL-SCNC: 108 MMOL/L (ref 96–108)
CO2 SERPL-SCNC: 26 MMOL/L (ref 21–32)
CREAT SERPL-MCNC: 1.01 MG/DL (ref 0.6–1.3)
EOSINOPHIL # BLD AUTO: 0.07 THOUSAND/ÂΜL (ref 0–0.61)
EOSINOPHIL NFR BLD AUTO: 2 % (ref 0–6)
ERYTHROCYTE [DISTWIDTH] IN BLOOD BY AUTOMATED COUNT: 14.3 % (ref 11.6–15.1)
GFR SERPL CREATININE-BSD FRML MDRD: 68 ML/MIN/1.73SQ M
GLUCOSE SERPL-MCNC: 105 MG/DL (ref 65–140)
HCT VFR BLD AUTO: 35.9 % (ref 36.5–49.3)
HGB BLD-MCNC: 12.2 G/DL (ref 12–17)
IMM GRANULOCYTES # BLD AUTO: 0.02 THOUSAND/UL (ref 0–0.2)
IMM GRANULOCYTES NFR BLD AUTO: 1 % (ref 0–2)
LYMPHOCYTES # BLD AUTO: 0.94 THOUSANDS/ÂΜL (ref 0.6–4.47)
LYMPHOCYTES NFR BLD AUTO: 24 % (ref 14–44)
MCH RBC QN AUTO: 34 PG (ref 26.8–34.3)
MCHC RBC AUTO-ENTMCNC: 34 G/DL (ref 31.4–37.4)
MCV RBC AUTO: 100 FL (ref 82–98)
MONOCYTES # BLD AUTO: 0.44 THOUSAND/ÂΜL (ref 0.17–1.22)
MONOCYTES NFR BLD AUTO: 11 % (ref 4–12)
NEUTROPHILS # BLD AUTO: 2.42 THOUSANDS/ÂΜL (ref 1.85–7.62)
NEUTS SEG NFR BLD AUTO: 61 % (ref 43–75)
NRBC BLD AUTO-RTO: 0 /100 WBCS
PLATELET # BLD AUTO: 178 THOUSANDS/UL (ref 149–390)
PMV BLD AUTO: 9.3 FL (ref 8.9–12.7)
POTASSIUM SERPL-SCNC: 3.3 MMOL/L (ref 3.5–5.3)
RBC # BLD AUTO: 3.59 MILLION/UL (ref 3.88–5.62)
SODIUM SERPL-SCNC: 138 MMOL/L (ref 135–147)
WBC # BLD AUTO: 3.92 THOUSAND/UL (ref 4.31–10.16)

## 2024-11-09 PROCEDURE — 80048 BASIC METABOLIC PNL TOTAL CA: CPT | Performed by: INTERNAL MEDICINE

## 2024-11-09 PROCEDURE — 85025 COMPLETE CBC W/AUTO DIFF WBC: CPT | Performed by: INTERNAL MEDICINE

## 2024-11-09 PROCEDURE — 99239 HOSP IP/OBS DSCHRG MGMT >30: CPT | Performed by: INTERNAL MEDICINE

## 2024-11-09 PROCEDURE — 99232 SBSQ HOSP IP/OBS MODERATE 35: CPT | Performed by: SURGERY

## 2024-11-09 RX ORDER — CEFUROXIME AXETIL 500 MG/1
500 TABLET ORAL EVERY 12 HOURS SCHEDULED
Qty: 10 TABLET | Refills: 0 | Status: SHIPPED | OUTPATIENT
Start: 2024-11-09 | End: 2024-11-14

## 2024-11-09 RX ORDER — POTASSIUM CHLORIDE 1500 MG/1
40 TABLET, EXTENDED RELEASE ORAL ONCE
Status: DISCONTINUED | OUTPATIENT
Start: 2024-11-09 | End: 2024-11-09 | Stop reason: HOSPADM

## 2024-11-09 RX ADMIN — APIXABAN 5 MG: 5 TABLET, FILM COATED ORAL at 09:18

## 2024-11-09 RX ADMIN — METOPROLOL SUCCINATE 50 MG: 50 TABLET, EXTENDED RELEASE ORAL at 09:18

## 2024-11-09 RX ADMIN — FUROSEMIDE 40 MG: 40 TABLET ORAL at 09:18

## 2024-11-09 RX ADMIN — CEFAZOLIN SODIUM 2000 MG: 2 SOLUTION INTRAVENOUS at 04:58

## 2024-11-09 NOTE — PROGRESS NOTES
Progress Note - Surgery-General   Name: Daniel Martins 84 y.o. male I MRN: [id number]  Unit/Bed#: -01 I Date of Admission: 11/7/2024   Date of Service: 11/9/2024 I Hospital Day: 2     Assessment & Plan  Cellulitis of left upper extremity  Improved since yesterday, no longer erythematous   No acute surgical intervention required at this time, no drainable collection  Continue elevation, ACE    Hypertension    Atrial fibrillation (HCC)  On eliquis     Bilateral leg edema      Pacemaker  Implanted October 2019          Subjective   No acute overnight events. Patient reports ACE was too tight and removed it. Swelling moved up to upper arm after elevation and redness looks better     Objective :  Temp:  [97.3 °F (36.3 °C)-97.7 °F (36.5 °C)] 97.7 °F (36.5 °C)  HR:  [60-65] 60  BP: (121-150)/(63-83) 150/77  Resp:  [20] 20  SpO2:  [97 %-100 %] 100 %  O2 Device: None (Room air)    I/O         11/07 0701  11/08 0700 11/08 0701  11/09 0700 11/09 0701  11/10 0700    P.O. 240 1200 180    IV Piggyback 50      Total Intake(mL/kg) 290 (4.1) 1200 (17.1) 180 (2.6)    Net +290 +1200 +180           Unmeasured Urine Occurrence 3 x              Phys exam:  NAD AAOx3  LUE with mild edema from hand to elbow. No significant erythema       Lab Results: I have reviewed the following results:  Recent Labs     11/07/24  0921 11/08/24  0523 11/09/24  0454   WBC 4.75   < > 3.92*   HGB 13.1   < > 12.2   HCT 37.5   < > 35.9*      < > 178   SODIUM 135   < > 138   K 3.7   < > 3.3*      < > 108   CO2 28   < > 26   BUN 18   < > 14   CREATININE 1.09   < > 1.01   GLUC 119   < > 105   AST 13  --   --    ALT 10  --   --    ALB 2.4*  --   --    TBILI 0.96  --   --    ALKPHOS 44  --   --    PTT 34  --   --    INR 1.07  --   --    LACTICACID 0.6  --   --     < > = values in this interval not displayed.       Imaging Results Review: No pertinent imaging studies reviewed.  Other Study Results Review: No additional pertinent studies  reviewed.    VTE Pharmacologic Prophylaxis: per primary  VTE Mechanical Prophylaxis: per primary

## 2024-11-09 NOTE — DISCHARGE SUMMARY
Discharge Summary - Hospitalist   Name: Daniel Martins 84 y.o. male I MRN: [id number]  Unit/Bed#: -01 I Date of Admission: 11/7/2024   Date of Service: 11/9/2024 I Hospital Day: 2     Assessment & Plan  Cellulitis of left upper extremity  Presented with left upper extremity swelling and redness.  Patient reported he usually has intermittent swelling in his hands however he noticed redness and worsening swelling in the beginning of the week.  Fell about 3 weeks ago and had skin tear however it was healing well.   Not meeting sepsis criteria  Denied any spider bites.  Medical improvement in erythema, warmth, mild swelling improvement with Ancef, will transition to Ceftin  Duplex is negative for blood clot  Surgery consult appreciated, no abscess  Ace wrap, elevation, ice  Follow-up with PCP, referral provided for lymphedema therapy if not resolving  Hypertension  Continue metoprolol 50 mg and Lasix  Monitor  Atrial fibrillation (HCC)  History of A-fib  On Toprol and Eliquis  Continue  Bilateral leg edema  History of bilateral lower extremity edema  Continue Lasix  Pacemaker       Medical Problems       Resolved Problems  Date Reviewed: 5/17/2024   None       Discharging Physician / Practitioner: Doc Swenson MD  PCP: Bacilio Estrada DO  Admission Date:   Admission Orders (From admission, onward)       Ordered        11/07/24 1123  INPATIENT ADMISSION  Once                          Discharge Date: 11/09/24    Consultations During Hospital Stay:  Surgery        Significant Findings / Test Results:   Duplex-left upper extremity no acute DVT, no superficial thrombophlebitis        Reason for Admission:   Chief Complaint   Patient presents with    Arm Swelling     Patient presents to the ER with left arm swelling.        Hospital Course:   Daniel Martins is a 84 y.o. male patient who originally presented to the hospital on 11/7/2024 due to left arm swelling, redness.  Patient waited for cellulitis with IV  "antibiotics, used Ace wrap, elevation for swelling.  DVT negative for blood clot.  Patient discharged with p.o. antibiotic      Please see above list of diagnoses and related plan for additional information.     Condition at Discharge: stable    Discharge Day Visit / Exam:   Subjective: Denies complaints, wants to go home  Vitals: Blood Pressure: 150/77 (11/09/24 0638)  Pulse: 60 (11/09/24 0638)  Temperature: 97.7 °F (36.5 °C) (11/09/24 0638)  Temp Source: Temporal (11/09/24 0638)  Respirations: 20 (11/09/24 0638)  Height: 5' 11\" (180.3 cm) (11/07/24 0846)  Weight - Scale: 70.3 kg (155 lb) (11/07/24 0846)  SpO2: 100 % (11/09/24 0638)  Physical Exam     Gen.-Patient comfortable   Neck- Supple. No thyromegaly or lymphadenopathy  Lungs-Clear bilaterally without any wheeze or rales   Heart S1-S2, regular rate and rhythm, no murmurs  Abdomen-soft nontender, no organomegaly. Bowel sounds present  Extremities-no cyanosi,  clubbing, improved swelling in the left upper extremity  Skin- no rash  Neuro-nonfocal     Discussion with Family: Patient declined call to .     Discharge instructions/Information to patient and family:   See after visit summary for information provided to patient and family.      Provisions for Follow-Up Care:  See after visit summary for information related to follow-up care and any pertinent home health orders.      Mobility at time of Discharge:   Basic Mobility Inpatient Raw Score: 24  JH-HLM Goal: 8: Walk 250 feet or more  JH-HLM Achieved: 8: Walk 250 feet ot more  HLM Goal achieved. Continue to encourage appropriate mobility.     Disposition:   Home    Planned Readmission: none    Discharge Medications:  See after visit summary for reconciled discharge medications provided to patient and/or family.      Administrative Statements   Discharge Statement:  I have spent a total time of 33 minutes in caring for this patient on the day of the visit/encounter. >30 minutes of time was spent " on: Diagnostic results, Counseling / Coordination of care, Documenting in the medical record, Reviewing / ordering tests, medicine, procedures  , and Communicating with other healthcare professionals .    **Please Note: This note may have been constructed using a voice recognition system**

## 2024-11-09 NOTE — UTILIZATION REVIEW
SEE INITIAL REVIEW AT BOTTOM    Date: 11-09-24  Day 3: Has surpassed a 2nd midnight with active treatments and services.    Continued Stay Review    Date: 11-09-24                          Current Patient Class: Inpatient  Current Level of Care: medical    HPI:84 y.o. male initially admitted on 11-07-24     Assessment/Plan: Continue Lasix and Ancef ACE wrap replaced for compression  Elevate LUE as much as possible Ice to left upper extremity as needed High protein diet and/or Ensure supplementation continue Toprol and Eliquis for A Fib and supportive care    Medications:   Scheduled Medications:  apixaban, 5 mg, Oral, BID  cefazolin, 2,000 mg, Intravenous, Q8H  furosemide, 40 mg, Oral, Daily  metoprolol succinate, 50 mg, Oral, Daily  tamsulosin, 0.4 mg, Oral, Daily With Dinner      Continuous IV Infusions:     PRN Meds:  acetaminophen, 650 mg, Oral, Q6H PRN      Discharge Plan: TBD    Vital Signs (last 3 days)       Date/Time Temp Pulse Resp BP MAP (mmHg) SpO2 O2 Flow Rate (L/min) O2 Device Patient Position - Orthostatic VS CIWA-Ar Total Pain    11/09/24 0705 -- -- -- -- -- -- -- -- -- -- No Pain    11/09/24 06:38:21 97.7 °F (36.5 °C) 60 20 150/77 101 100 % -- None (Room air) Lying -- --    11/09/24 06:35:34 97.7 °F (36.5 °C) 61 -- -- -- 99 % -- -- -- -- --    11/08/24 21:49:17 97.5 °F (36.4 °C) 63 -- 121/63 82 97 % -- None (Room air) Lying -- --    11/08/24 2050 -- -- -- -- -- -- -- -- -- -- No Pain    11/08/24 15:27:56 97.3 °F (36.3 °C) 65 -- 144/83 103 100 % -- None (Room air) Lying 0 --    11/08/24 1200 -- 62 -- 116/71 -- -- -- -- -- 0 --    11/08/24 1100 -- -- -- -- -- -- -- -- -- -- No Pain    11/08/24 07:18:21 97.5 °F (36.4 °C) 67 -- 115/68 84 98 % -- -- -- 0 --    11/07/24 22:08:19 97.5 °F (36.4 °C) 74 -- 124/71 89 97 % -- None (Room air) Lying -- --    11/07/24 2000 -- -- -- -- -- -- -- -- -- -- No Pain    11/07/24 1758 97.7 °F (36.5 °C) -- -- -- -- -- -- -- -- 0 --    11/07/24 1755 -- -- -- -- -- 98 % 0  L/min None (Room air) -- -- --    11/07/24 15:26:33 97.2 °F (36.2 °C) 63 -- 105/63 77 97 % -- None (Room air) Sitting -- --    11/07/24 14:05:50 97 °F (36.1 °C) 67 -- 131/74 93 97 % -- None (Room air) Sitting -- --    11/07/24 1330 -- 60 20 154/67 97 95 % -- None (Room air) Lying -- 3    11/07/24 1248 -- 60 18 179/79 -- 99 % -- None (Room air) Lying -- 3    11/07/24 1200 -- 60 17 154/68 98 97 % -- None (Room air) -- -- --    11/07/24 1130 -- 89 17 168/92 123 98 % -- None (Room air) -- -- --    11/07/24 1100 -- 64 15 159/67 97 99 % -- -- -- -- --    11/07/24 0846 97.8 °F (36.6 °C) 97 18 160/73 -- 98 % -- -- Lying -- 3          Weight (last 2 days)       Date/Time Weight    11/07/24 0846 70.3 (155)           CIWA-Ar Score       Row Name 11/08/24 15:27:56 11/08/24 1200 11/08/24 07:18:21       CIWA-Ar    BP -- 116/71 --    Pulse -- 62 --    Nausea and Vomiting 0 0 0    Tactile Disturbances 0 0 0    Tremor 0 0 0    Auditory Disturbances 0 0 0    Paroxysmal Sweats 0 0 0    Visual Disturbances 0 0 0    Anxiety 0 0 0    Headache, Fullness in Head 0 0 0    Agitation 0 0 0    Orientation and Clouding of Sensorium 0 0 0    CIWA-Ar Total 0 0 0                  Pertinent Labs/Diagnostic Results:   Radiology:  VAS upper limb venous duplex scan, unilateral/limited   Final Interpretation by Russ Silva DO (11/08 1631)        Cardiology:  ECG 12 lead   Final Result by Chuy Lea MD (11/08 1828)   Electronic atrial pacemaker   Premature atrial complexes   Abnormal ECG   When compared with ECG of 11-Oct-2023 11:21,   No significant change was found   Confirmed by Chuy Lea ([id number]) on 11/8/2024 6:28:37 PM        GI:  No orders to display           Results from last 7 days   Lab Units 11/09/24  0454 11/08/24  0523 11/07/24  0921   WBC Thousand/uL 3.92* 4.48 4.75   HEMOGLOBIN g/dL 12.2 11.7* 13.1   HEMATOCRIT % 35.9* 34.4* 37.5   PLATELETS Thousands/uL 178 165 172   TOTAL NEUT ABS Thousands/µL 2.42  --  3.53          Results from last 7 days   Lab Units 11/09/24  0454 11/08/24  0523 11/07/24 0921   SODIUM mmol/L 138 137 135   POTASSIUM mmol/L 3.3* 3.5 3.7   CHLORIDE mmol/L 108 107 104   CO2 mmol/L 26 27 28   ANION GAP mmol/L 4 3* 3*   BUN mg/dL 14 18 18   CREATININE mg/dL 1.01 1.08 1.09   EGFR ml/min/1.73sq m 68 63 62   CALCIUM mg/dL 7.7* 7.6* 7.7*     Results from last 7 days   Lab Units 11/07/24 0921   AST U/L 13   ALT U/L 10   ALK PHOS U/L 44   TOTAL PROTEIN g/dL 4.6*   ALBUMIN g/dL 2.4*   TOTAL BILIRUBIN mg/dL 0.96         Results from last 7 days   Lab Units 11/09/24  0454 11/08/24  0523 11/07/24 0921   GLUCOSE RANDOM mg/dL 105 110 119       Results from last 7 days   Lab Units 11/07/24 0921   PROTIME seconds 14.4   INR  1.07   PTT seconds 34         Results from last 7 days   Lab Units 11/07/24 0921   PROCALCITONIN ng/ml 0.56*     Results from last 7 days   Lab Units 11/07/24 0921   LACTIC ACID mmol/L 0.6                                                                                 Results from last 7 days   Lab Units 11/07/24 0921   BLOOD CULTURE  No Growth at 24 hrs.  No Growth at 24 hrs.                   Network Utilization Review Department  ATTENTION: Please call with any questions or concerns to [phone] and carefully listen to the prompts so that you are directed to the right person. All voicemails are confidential.   For Discharge needs, contact Care Management DC Support Team at [phone] opt. 2  Send all requests for admission clinical reviews, approved or denied determinations and any other requests to dedicated fax number below belonging to the campus where the patient is receiving treatment. List of dedicated fax numbers for the Facilities:  FACILITY NAME UR FAX NUMBER   ADMISSION DENIALS (Administrative/Medical Necessity) [phone]   DISCHARGE SUPPORT TEAM (NETWORK) [phone]   PARENT CHILD HEALTH (Maternity/NICU/Pediatrics) [phone]   [facility]  [phone]   [facility] [phone]   [facility] [phone]   [facility] [phone]   [facility] [phone]   [facility] [phone]   General acute hospital [phone]   [facility] [phone]   [facility] [phone]   [facility] [phone]   [facility] [phone]   [facility] [phone]

## 2024-11-09 NOTE — ASSESSMENT & PLAN NOTE
Improved since yesterday, no longer erythematous   No acute surgical intervention required at this time, no drainable collection  Continue elevation, ACE

## 2024-11-09 NOTE — PLAN OF CARE

## 2024-11-09 NOTE — DISCHARGE INSTR - AVS FIRST PAGE
Please follow-up with your primary care doctor within 1 to 2 weeks of discharge  If persistent swelling of left arm, please see your primary care doctor

## 2024-11-09 NOTE — PLAN OF CARE
Problem: Potential for Falls  Goal: Patient will remain free of falls  Description: INTERVENTIONS:  - Educate patient/family on patient safety including physical limitations  - Instruct patient to call for assistance with activity   - Consult OT/PT to assist with strengthening/mobility   - Keep Call bell within reach  - Keep bed low and locked with side rails adjusted as appropriate  - Keep care items and personal belongings within reach  - Initiate and maintain comfort rounds  - Make Fall Risk Sign visible to staff  - Offer Toileting every 2 Hours, in advance of need  - Initiate/Maintain call bell alarm  - Obtain necessary fall risk management equipment: call bell usage  - Apply yellow socks and bracelet for high fall risk patients  - Consider moving patient to room near nurses station  Outcome: Progressing     Problem: PAIN - ADULT  Goal: Verbalizes/displays adequate comfort level or baseline comfort level  Description: Interventions:  - Encourage patient to monitor pain and request assistance  - Assess pain using appropriate pain scale  - Administer analgesics based on type and severity of pain and evaluate response  - Implement non-pharmacological measures as appropriate and evaluate response  - Consider cultural and social influences on pain and pain management  - Notify physician/advanced practitioner if interventions unsuccessful or patient reports new pain  Outcome: Progressing     Problem: INFECTION - ADULT  Goal: Absence or prevention of progression during hospitalization  Description: INTERVENTIONS:  - Assess and monitor for signs and symptoms of infection  - Monitor lab/diagnostic results  - Monitor all insertion sites, i.e. indwelling lines, tubes, and drains  - Monitor endotracheal if appropriate and nasal secretions for changes in amount and color  - [place] appropriate cooling/warming therapies per order  - Administer medications as ordered  - Instruct and encourage patient and family to use good  hand hygiene technique  - Identify and instruct in appropriate isolation precautions for identified infection/condition  Outcome: Progressing  Goal: Absence of fever/infection during neutropenic period  Description: INTERVENTIONS:  - Monitor WBC    Outcome: Progressing

## 2024-11-09 NOTE — ASSESSMENT & PLAN NOTE
Presented with left upper extremity swelling and redness.  Patient reported he usually has intermittent swelling in his hands however he noticed redness and worsening swelling in the beginning of the week.  Fell about 3 weeks ago and had skin tear however it was healing well.   Not meeting sepsis criteria  Denied any spider bites.  Medical improvement in erythema, warmth, mild swelling improvement with Ancef, will transition to Ceftin  Duplex is negative for blood clot  Surgery consult appreciated, no abscess  Ace wrap, elevation, ice  Follow-up with PCP, referral provided for lymphedema therapy if not resolving

## 2024-11-11 NOTE — UTILIZATION REVIEW
NOTIFICATION OF ADMISSION DISCHARGE   This is a Notification of Discharge from [facility]. Please be advised that this patient has been discharge from our facility. Below you will find the admission and discharge date and time including the patient’s disposition.   UTILIZATION REVIEW CONTACT:  Neva Hurtado  Utilization   Network Utilization Review Department  Phone: [phone] x carefully listen to the prompts. All voicemails are confidential.  Email: [email or web address]     ADMISSION INFORMATION  PRESENTATION DATE: 11/7/2024  8:44 AM  OBERVATION ADMISSION DATE: N/A  INPATIENT ADMISSION DATE: 11/7/24 11:23 AM   DISCHARGE DATE: 11/9/2024 12:00 PM   DISPOSITION:Home/Self Care    Network Utilization Review Department  ATTENTION: Please call with any questions or concerns to [phone] and carefully listen to the prompts so that you are directed to the right person. All voicemails are confidential.   For Discharge needs, contact Care Management DC Support Team at [phone] opt. 2  Send all requests for admission clinical reviews, approved or denied determinations and any other requests to dedicated fax number below belonging to the campus where the patient is receiving treatment. List of dedicated fax numbers for the Facilities:  FACILITY NAME UR FAX NUMBER   ADMISSION DENIALS (Administrative/Medical Necessity) [phone]   DISCHARGE SUPPORT TEAM ([facility]) [phone]   PARENT CHILD HEALTH (Maternity/NICU/Pediatrics) [phone]   [facility] [phone]   [facility] [phone]   [facility] [phone]   [facility] [phone]   [facility] [phone]   [facility] [phone]   [facility] [phone]   [facility] [phone]    [facility] [phone]   [facility] [phone]   [facility] [phone]   [facility] [phone]

## 2024-11-12 LAB
BACTERIA BLD CULT: NORMAL
BACTERIA BLD CULT: NORMAL

## 2024-11-25 ENCOUNTER — OFFICE VISIT (OUTPATIENT)
Dept: CARDIOLOGY CLINIC | Facility: CLINIC | Age: 84
End: 2024-11-25
Payer: COMMERCIAL

## 2024-11-25 VITALS
OXYGEN SATURATION: 99 % | WEIGHT: 157 LBS | DIASTOLIC BLOOD PRESSURE: 86 MMHG | BODY MASS INDEX: 21.98 KG/M2 | HEIGHT: 71 IN | HEART RATE: 73 BPM | SYSTOLIC BLOOD PRESSURE: 134 MMHG

## 2024-11-25 DIAGNOSIS — I10 PRIMARY HYPERTENSION: ICD-10-CM

## 2024-11-25 DIAGNOSIS — I48.0 PAROXYSMAL ATRIAL FIBRILLATION (HCC): ICD-10-CM

## 2024-11-25 DIAGNOSIS — I71.21 ANEURYSM OF ASCENDING AORTA WITHOUT RUPTURE (HCC): Primary | ICD-10-CM

## 2024-11-25 DIAGNOSIS — I49.5 SICK SINUS SYNDROME (HCC): ICD-10-CM

## 2024-11-25 DIAGNOSIS — R60.0 BILATERAL LEG EDEMA: ICD-10-CM

## 2024-11-25 PROCEDURE — 99214 OFFICE O/P EST MOD 30 MIN: CPT | Performed by: INTERNAL MEDICINE

## 2024-11-25 RX ORDER — METOPROLOL SUCCINATE 50 MG/1
50 TABLET, EXTENDED RELEASE ORAL DAILY
Qty: 90 TABLET | Refills: 3 | Status: SHIPPED | OUTPATIENT
Start: 2024-11-25

## 2024-11-25 NOTE — PROGRESS NOTES
Cardiology Outpatient Follow-Up Note - Daniel Martins 84 y.o. male MRN: [id number]      Assessment/Plan:    1. Aneurysm of ascending aorta without rupture (HCC)  Overview:  CT on 10/11/23: 4.1 cm ascending aorta.  On anti-impulse therapy with beta-blocker.  Will need annual surveillance.  2. Paroxysmal atrial fibrillation (HCC)  Overview:  On rate control with metoprolol XL  Anticoagulated with Eliquis 5 mg BID  Device interrogation March 2024 showed 0.5% AF burden. Sept 2024 interrogation did not show AF.   3. Sick sinus syndrome (HCC)  Overview:  [employer] PPM implanted Oct 2019  4. Bilateral leg edema  5. Primary hypertension  Overview:  Maintained on metoprolol XL and furosemide      Stable on current therapy. No changes recommended today.   We will check CT chest for monitoring of TAA    We will see Daniel Martins back in 6 months for follow-up.    Subjective:     HPI: Daniel Martins is a 84 y.o. year old male with PAF, TAA, SSS s/p PPM, presenting for follow-up     LE edema has improved. He is on lasix with improvement.   Lightheadedness has resolved since we stopped lisinopril and amlodipine.     EP device report 9/30/24 - 7.9 yrs battery, AP 99.5%, 1 VT-NS -- SVT on EGM. No AF reported.     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     1.6 cm left adrenal nodule, indeterminate possibly adenoma if patient has no known oncological history, can be characterized with the nonemergent noncontrast CT/CT with adrenal protocol     Mild nonspecific thickening of the  "jejunum probably due to redundant small bowel loops     Diffuse bladder wall thickening likely sequela of chronic bladder outlet obstruction         EKGs, personally reviewed:  N/a    Relevant Labs & Results:    CMP 10/11/23 - K 4.2, Cr 1.11  Lipid panel 7/31/23 - LDL 80 mg/dL   CBC 10/11/23 - Hgb 13.4 g/dL    ROS:  Review of Systems:  Review of Systems    Objective:     Vitals:   Vitals:    11/25/24 1535   BP: 134/86   BP Location: Right arm   Patient Position: Sitting   Cuff Size: Standard   Pulse: 73   SpO2: 99%   Weight: 71.2 kg (157 lb)   Height: 5' 11\" (1.803 m)    Body surface area is 1.9 meters squared.  Wt Readings from Last 3 Encounters:   11/25/24 71.2 kg (157 lb)   11/07/24 70.3 kg (155 lb)   06/25/24 70 kg (154 lb 6.4 oz)       Physical Exam:  General: Daniel Martins is a well appearing male, in no acute distress, sitting comfortably  HEENT: moist mucous membranes, EOMI  Neck:  No JVD, supple, trachea midline  Cardiovascular: soft S1/S2, regular rate and rhythm, no murmurs, rubs or gallops  Pulmonary: normal respiratory effort, CTAB  Abdomen: soft and nondistended  Extremities: +trace to +1 pitting bilateral lower extremity edema.   Neuro: no focal motor deficits, AAOx3 (person, place, time)  Psych: Normal mood and affect, cooperative      Medications (at the START of this encounter):  Outpatient Medications Prior to Visit   Medication Sig Dispense Refill    apixaban (Eliquis) 5 mg Take 1 tablet (5 mg total) by mouth 2 (two) times a day 90 tablet 3    furosemide (LASIX) 40 mg tablet Take 40 mg by mouth daily      metoprolol succinate (TOPROL-XL) 50 mg 24 hr tablet TAKE 1 TABLET BY MOUTH EVERY DAY 90 tablet 1    tamsulosin (FLOMAX) 0.4 mg Take 0.4 mg by mouth daily with dinner       No facility-administered medications prior to visit.       This note was completed in part utilizing Dragon [employer] One voice recognition software. Grammatical errors, random word insertion, spelling mistakes, occasional wrong " "word or \"sound-alike\" substitutions and incomplete sentences may be an occasional consequence of the system secondary to software limitations, ambient noise and hardware issues. At the time of dictation, efforts were made to edit, clarify and /or correct errors.  Please read the chart carefully and recognize, using context, where substitutions have occurred.  If you have any questions or concerns about the context, text or information contained within the body of this dictation, please contact myself, the provider, for further clarification.    "

## 2024-12-30 ENCOUNTER — REMOTE DEVICE CLINIC VISIT (OUTPATIENT)
Dept: CARDIOLOGY CLINIC | Facility: CLINIC | Age: 84
End: 2024-12-30
Payer: COMMERCIAL

## 2024-12-30 ENCOUNTER — RESULTS FOLLOW-UP (OUTPATIENT)
Dept: NON INVASIVE DIAGNOSTICS | Facility: HOSPITAL | Age: 84
End: 2024-12-30

## 2024-12-30 DIAGNOSIS — Z95.0 CARDIAC PACEMAKER IN SITU: Primary | ICD-10-CM

## 2024-12-30 PROCEDURE — 93296 REM INTERROG EVL PM/IDS: CPT | Performed by: STUDENT IN AN ORGANIZED HEALTH CARE EDUCATION/TRAINING PROGRAM

## 2024-12-30 PROCEDURE — 93294 REM INTERROG EVL PM/LDLS PM: CPT | Performed by: STUDENT IN AN ORGANIZED HEALTH CARE EDUCATION/TRAINING PROGRAM

## 2024-12-30 NOTE — PROGRESS NOTES
Results for orders placed or performed in visit on 12/30/24   Cardiac EP device report    Narrative    MDT DC PPM (MVP ON) - ACTIVE SYSTEM IS MRI CONDITIONAL  CARELINK TRANSMISSION: BATTERY VOLTAGE ADEQUATE (7.6 YRS). AP>99%, <0.1%. ALL AVAILABLE LEAD PARAMETERS WITHIN NORMAL LIMITS. 1 AF EPISODE LASTING 1 MIN. PT ON ELIQUIS & METOPROLOL. NORMAL DEVICE FUNCTION. GV

## 2025-01-27 ENCOUNTER — HOSPITAL ENCOUNTER (EMERGENCY)
Facility: HOSPITAL | Age: 85
Discharge: LEFT BEFORE DISCHARGE | DRG: 603 | End: 2025-01-27
Attending: EMERGENCY MEDICINE | Admitting: EMERGENCY MEDICINE
Payer: COMMERCIAL

## 2025-01-27 ENCOUNTER — APPOINTMENT (EMERGENCY)
Dept: RADIOLOGY | Facility: HOSPITAL | Age: 85
DRG: 603 | End: 2025-01-27
Payer: COMMERCIAL

## 2025-01-27 VITALS
WEIGHT: 160 LBS | BODY MASS INDEX: 22.4 KG/M2 | TEMPERATURE: 98.3 F | OXYGEN SATURATION: 97 % | DIASTOLIC BLOOD PRESSURE: 76 MMHG | HEIGHT: 71 IN | RESPIRATION RATE: 21 BRPM | HEART RATE: 89 BPM | SYSTOLIC BLOOD PRESSURE: 159 MMHG

## 2025-01-27 DIAGNOSIS — L03.113 CELLULITIS OF RIGHT UPPER EXTREMITY: Primary | ICD-10-CM

## 2025-01-27 DIAGNOSIS — Z53.29 LEFT AGAINST MEDICAL ADVICE: ICD-10-CM

## 2025-01-27 LAB
ALBUMIN SERPL BCG-MCNC: 2.8 G/DL (ref 3.5–5)
ALP SERPL-CCNC: 44 U/L (ref 34–104)
ALT SERPL W P-5'-P-CCNC: 9 U/L (ref 7–52)
ANION GAP SERPL CALCULATED.3IONS-SCNC: 5 MMOL/L (ref 4–13)
APTT PPP: 35 SECONDS (ref 23–34)
AST SERPL W P-5'-P-CCNC: 13 U/L (ref 13–39)
BASOPHILS # BLD AUTO: 0.02 THOUSANDS/ΜL (ref 0–0.1)
BASOPHILS NFR BLD AUTO: 0 % (ref 0–1)
BILIRUB SERPL-MCNC: 0.93 MG/DL (ref 0.2–1)
BUN SERPL-MCNC: 20 MG/DL (ref 5–25)
CALCIUM ALBUM COR SERPL-MCNC: 9.2 MG/DL (ref 8.3–10.1)
CALCIUM SERPL-MCNC: 8.2 MG/DL (ref 8.4–10.2)
CHLORIDE SERPL-SCNC: 108 MMOL/L (ref 96–108)
CO2 SERPL-SCNC: 24 MMOL/L (ref 21–32)
CREAT SERPL-MCNC: 1.14 MG/DL (ref 0.6–1.3)
EOSINOPHIL # BLD AUTO: 0 THOUSAND/ΜL (ref 0–0.61)
EOSINOPHIL NFR BLD AUTO: 0 % (ref 0–6)
ERYTHROCYTE [DISTWIDTH] IN BLOOD BY AUTOMATED COUNT: 13.2 % (ref 11.6–15.1)
GFR SERPL CREATININE-BSD FRML MDRD: 59 ML/MIN/1.73SQ M
GLUCOSE SERPL-MCNC: 134 MG/DL (ref 65–140)
HCT VFR BLD AUTO: 40.4 % (ref 36.5–49.3)
HGB BLD-MCNC: 13.6 G/DL (ref 12–17)
IMM GRANULOCYTES # BLD AUTO: 0.07 THOUSAND/UL (ref 0–0.2)
IMM GRANULOCYTES NFR BLD AUTO: 1 % (ref 0–2)
INR PPP: 1.11 (ref 0.85–1.19)
LACTATE SERPL-SCNC: 0.9 MMOL/L (ref 0.5–2)
LYMPHOCYTES # BLD AUTO: 0.44 THOUSANDS/ΜL (ref 0.6–4.47)
LYMPHOCYTES NFR BLD AUTO: 4 % (ref 14–44)
MCH RBC QN AUTO: 33.6 PG (ref 26.8–34.3)
MCHC RBC AUTO-ENTMCNC: 33.7 G/DL (ref 31.4–37.4)
MCV RBC AUTO: 100 FL (ref 82–98)
MONOCYTES # BLD AUTO: 1.06 THOUSAND/ΜL (ref 0.17–1.22)
MONOCYTES NFR BLD AUTO: 9 % (ref 4–12)
NEUTROPHILS # BLD AUTO: 9.79 THOUSANDS/ΜL (ref 1.85–7.62)
NEUTS SEG NFR BLD AUTO: 86 % (ref 43–75)
NRBC BLD AUTO-RTO: 0 /100 WBCS
PLATELET # BLD AUTO: 135 THOUSANDS/UL (ref 149–390)
PMV BLD AUTO: 9.6 FL (ref 8.9–12.7)
POTASSIUM SERPL-SCNC: 3.7 MMOL/L (ref 3.5–5.3)
PROCALCITONIN SERPL-MCNC: 0.21 NG/ML
PROT SERPL-MCNC: 5.1 G/DL (ref 6.4–8.4)
PROTHROMBIN TIME: 14.9 SECONDS (ref 12.3–15)
RBC # BLD AUTO: 4.05 MILLION/UL (ref 3.88–5.62)
SODIUM SERPL-SCNC: 137 MMOL/L (ref 135–147)
WBC # BLD AUTO: 11.38 THOUSAND/UL (ref 4.31–10.16)

## 2025-01-27 PROCEDURE — 73110 X-RAY EXAM OF WRIST: CPT

## 2025-01-27 PROCEDURE — 83605 ASSAY OF LACTIC ACID: CPT

## 2025-01-27 PROCEDURE — 93005 ELECTROCARDIOGRAM TRACING: CPT

## 2025-01-27 PROCEDURE — 96365 THER/PROPH/DIAG IV INF INIT: CPT

## 2025-01-27 PROCEDURE — 85730 THROMBOPLASTIN TIME PARTIAL: CPT

## 2025-01-27 PROCEDURE — 85610 PROTHROMBIN TIME: CPT

## 2025-01-27 PROCEDURE — 73090 X-RAY EXAM OF FOREARM: CPT

## 2025-01-27 PROCEDURE — 99285 EMERGENCY DEPT VISIT HI MDM: CPT

## 2025-01-27 PROCEDURE — 36415 COLL VENOUS BLD VENIPUNCTURE: CPT

## 2025-01-27 PROCEDURE — 80053 COMPREHEN METABOLIC PANEL: CPT

## 2025-01-27 PROCEDURE — 99284 EMERGENCY DEPT VISIT MOD MDM: CPT

## 2025-01-27 PROCEDURE — 71045 X-RAY EXAM CHEST 1 VIEW: CPT

## 2025-01-27 PROCEDURE — 87040 BLOOD CULTURE FOR BACTERIA: CPT

## 2025-01-27 PROCEDURE — 84145 PROCALCITONIN (PCT): CPT

## 2025-01-27 PROCEDURE — 85025 COMPLETE CBC W/AUTO DIFF WBC: CPT

## 2025-01-27 RX ORDER — SULFAMETHOXAZOLE AND TRIMETHOPRIM 800; 160 MG/1; MG/1
1 TABLET ORAL 2 TIMES DAILY
Qty: 20 TABLET | Refills: 0 | Status: SHIPPED | OUTPATIENT
Start: 2025-01-27 | End: 2025-02-01

## 2025-01-27 RX ORDER — CEPHALEXIN 500 MG/1
500 CAPSULE ORAL EVERY 6 HOURS SCHEDULED
Qty: 28 CAPSULE | Refills: 0 | Status: SHIPPED | OUTPATIENT
Start: 2025-01-27 | End: 2025-02-01

## 2025-01-27 RX ORDER — CEFAZOLIN SODIUM 2 G/50ML
2000 SOLUTION INTRAVENOUS ONCE
Status: COMPLETED | OUTPATIENT
Start: 2025-01-27 | End: 2025-01-27

## 2025-01-27 RX ADMIN — CEFAZOLIN SODIUM 2000 MG: 2 SOLUTION INTRAVENOUS at 12:20

## 2025-01-27 NOTE — ED PROVIDER NOTES
"Time reflects when diagnosis was documented in both MDM as applicable and the Disposition within this note       Time User Action Codes Description Comment    1/27/2025  2:02 PM HeronhuyenJuancarlos mayenga Add [L03.113] Cellulitis of right upper extremity     1/27/2025  2:02 PM Sunshine Suarez Add [Z53.29] Left against medical advice     1/27/2025  2:02 PM Grygfaheem Sunshine Modify [L03.113] Cellulitis of right upper extremity     1/27/2025  2:02 PM Heronhuyenfaheem Sunshine Modify [Z53.29] Left against medical advice     1/27/2025  2:02 PM Grygfaheem Sunshine Modify [L03.113] Cellulitis of right upper extremity     1/27/2025  2:02 PM Erick Sunshine Modify [Z53.29] Left against medical advice           ED Disposition       ED Disposition   AMA    Condition   --    Date/Time   Mon Jan 27, 2025  2:02 PM    Comment   Date: 1/27/2025  Patient: Daniel Martins  Admitted: 1/27/2025  9:58 AM  Attending Provider: Rafa Bean DO    Daniel Martins or his authorized caregiver has made the decision for the patient to leave the emergency department against the advice of  the emergency department staff. He or his authorized caregiver has been informed and understands the inherent risks, including death, permament disability, delayed treatment, delayed diagnosis, infection, sepsis, worsening of the underlying conditio n, bleeding complications, progression to more severe illness, potential for surgical intervention, loss of bodily function, shock, severe pain or discomfort.  He or his authorized caregiver has decided to accept the responsibility for this decision.  Daniel Martins and all necessary parties have been advised that he may return for further evaluation or treatment. His condition at time of discharge was stable.  Daniel Martins had current vital signs as follows:  BP (!) 171/73 (BP Location: Left arm )   Pulse 75   Temp 98.3 °F (36.8 °C) (Temporal)   Resp 18   Ht 5' 11\" (1.803 m)   Wt 72.6 kg (160 lb)                Assessment & Plan       Medical " Decision Making  The patient is an 84-year-old male presenting to the emergency department due to right upper extremity swelling and pain x 3 days. Differential diagnosis includes, but is not limited to, cellulitis, tenosynovitis, dependent edema, other soft tissue injury, etc. Will obtain labs and imaging.    The patient was advised that outpatient therapy is unlikely to result in significant improvement giving the concerning findings, including cellulitis and potential tenosynovitis, etc. which require inpatient management.  The risks of pursuing outpatient care were thoroughly discussed, including the possibility of worsening infection, progression, sepsis, permanent functional impairment, amongst others.  Despite this, the patient expressed a strong preference for outpatient management and has decided to declined admission.  The patient was counseled extensively regarding the importance of close follow-up and strict return precaution is opted to sign out AGAINST MEDICAL ADVICE.    The patient is clinically not intoxicated, appears to have intact insight, judgment and reason and in my medical opinion has the capacity to make decisions. I have voiced my concerns for the patient's health given that a full evaluation and treatment had not occurred. I have discussed the need for continued evaluation to determine if their symptoms are caused by a condition that present risk of death or morbidity. Risks including but not limited to death, permanent disability, prolonged hospitalization, prolonged illness, etc. were discussed. I discussed the specific benefits of additional treatment, as well as tried offering alternative options in hopes that the patient might be amenable to partial evaluation and treatment which would be medically beneficial to the patient. However, the patient declined my options and insisted on leaving.  I emphasized that leaving against medical advice does not preclude returning here for further  evaluation. I asked the patient to return if they change their mind about the further evaluation and treatment. I strongly encouraged the patient to return to this Emergency Department or any Emergency Department at any time, particularly with worsening symptoms.  Patient verbalized understanding, acknowledged understanding of these risks and signed the AMA form before leaving, please find in chart.    Amount and/or Complexity of Data Reviewed  Labs: ordered.  Radiology: ordered.    Risk  Prescription drug management.             Medications   ceFAZolin (ANCEF) IVPB (premix in dextrose) 2,000 mg 50 mL (0 mg Intravenous Stopped 1/27/25 1250)       ED Risk Strat Scores                          SBIRT 20yo+      Flowsheet Row Most Recent Value   Initial Alcohol Screen: US AUDIT-C     1. How often do you have a drink containing alcohol? 0 Filed at: 01/27/2025 1000   2. How many drinks containing alcohol do you have on a typical day you are drinking?  0 Filed at: 01/27/2025 1000   3a. Male UNDER 65: How often do you have five or more drinks on one occasion? 0 Filed at: 01/27/2025 1000   3b. FEMALE Any Age, or MALE 65+: How often do you have 4 or more drinks on one occassion? 0 Filed at: 01/27/2025 1000   Audit-C Score 0 Filed at: 01/27/2025 1000   LEIA: How many times in the past year have you...    Used an illegal drug or used a prescription medication for non-medical reasons? Never Filed at: 01/27/2025 1000                            History of Present Illness       Chief Complaint   Patient presents with    Hand Swelling     Pt repotrs started with swelling to right hand Saturday night. Pt reports happen to left hand before and had to stay in hospital 2 days.       Past Medical History:   Diagnosis Date    A-fib (HCC)     Hypertension     Lyme disease       Past Surgical History:   Procedure Laterality Date    CARDIAC PACEMAKER PLACEMENT  10/2019      History reviewed. No pertinent family history.   Social History      Tobacco Use    Smoking status: Never    Smokeless tobacco: Never   Vaping Use    Vaping status: Never Used   Substance Use Topics    Alcohol use: Yes     Comment: socially    Drug use: Never      E-Cigarette/Vaping    E-Cigarette Use Never User       E-Cigarette/Vaping Substances      I have reviewed and agree with the history as documented.     The patient is an 84-year-old male with past medical history of History of atrial fibrillation (on Toprol and Eliquis), hypertension, left arm cellulitis, presenting to the emergency department for evaluation of right upper extremity swelling that initially started 3 days ago and has worsened since onset. Patient reports pain mostly over the dorsal aspect of his right wrist and reports pain with range of motion. He denies fever, chills, trauma to the extremity, known bug bites, numbness, weakness, tremors. At time of initial evaluation, patient explains that he is not open to being admitted, stating that his last hospitalization was not covered by insurance and he had to pay out-of-pocket.          Review of Systems   Constitutional:  Negative for chills and fever.   HENT:  Negative for ear pain and sore throat.    Eyes:  Negative for pain and visual disturbance.   Respiratory:  Negative for cough and shortness of breath.    Cardiovascular:  Negative for chest pain and palpitations.   Gastrointestinal:  Negative for abdominal pain and vomiting.   Genitourinary:  Negative for dysuria.   Musculoskeletal:  Positive for arthralgias and myalgias. Negative for back pain.   Skin:  Positive for color change and rash.   Neurological:  Negative for seizures, syncope and numbness.   All other systems reviewed and are negative.          Objective       ED Triage Vitals [01/27/25 0948]   Temperature Pulse Blood Pressure Respirations SpO2 Patient Position - Orthostatic VS   98.3 °F (36.8 °C) 72 150/67 18 97 % Sitting      Temp Source Heart Rate Source BP Location FiO2 (%) Pain Score     Temporal Monitor Right arm -- 10 - Worst Possible Pain      Vitals      Date and Time Temp Pulse SpO2 Resp BP Pain Score FACES Pain Rating User   01/27/25 1349 -- 89 97 % 21 159/76 8 -- [facility]   01/27/25 1149 -- 75 98 % 18 171/73 8 -- [facility]   01/27/25 1147 -- 80 97 % -- -- -- -- [facility]   01/27/25 0948 98.3 °F (36.8 °C) 72 97 % 18 150/67 10 - Worst Possible Pain -- CM            Physical Exam  Vitals and nursing note reviewed.   Constitutional:       General: He is not in acute distress.     Appearance: Normal appearance. He is well-developed. He is not ill-appearing.   HENT:      Head: Normocephalic and atraumatic.   Eyes:      Conjunctiva/sclera: Conjunctivae normal.   Cardiovascular:      Rate and Rhythm: Normal rate and regular rhythm.   Pulmonary:      Effort: Pulmonary effort is normal. No respiratory distress.      Breath sounds: Normal breath sounds. No wheezing.   Abdominal:      General: Abdomen is flat.      Palpations: Abdomen is soft.      Tenderness: There is no abdominal tenderness.   Musculoskeletal:         General: Swelling and tenderness present.      Cervical back: Neck supple.      Comments: Swelling noted to the right upper extremity, mostly distal to the elbow with associated limited range of motion of the wrist and fingers due to pain and swelling. Patient is able to make okay sign however has difficulty with finger opposition. Intact pulses bilaterally, cap refill <2s.  Compartments soft, no crepitus, or palpable deformities.   Skin:     General: Skin is warm and dry.      Capillary Refill: Capillary refill takes less than 2 seconds.      Findings: Erythema and rash present.      Comments: Swelling with associated warmth and erythema noted to the right upper extremity, mostly distal to the elbow. Several healed scars present noted at the forearm. No pus, oozing, signs of secondary bacterial infections.  See below, patient verbalized permission for media.   Neurological:      Mental Status: He is  alert.   Psychiatric:         Mood and Affect: Mood normal.                   Results Reviewed       Procedure Component Value Units Date/Time    Blood culture #1 [[id number]] Collected: 01/27/25 1135    Lab Status: Preliminary result Specimen: Blood from Arm, Left Updated: 01/27/25 1701     Blood Culture Received in Microbiology Lab. Culture in Progress.    Blood culture #2 [[id number]] Collected: 01/27/25 1135    Lab Status: Preliminary result Specimen: Blood from Arm, Left Updated: 01/27/25 1701     Blood Culture Received in Microbiology Lab. Culture in Progress.    Comprehensive metabolic panel [[id number]]  (Abnormal) Collected: 01/27/25 1335    Lab Status: Final result Specimen: Blood from Arm, Left Updated: 01/27/25 1356     Sodium 137 mmol/L      Potassium 3.7 mmol/L      Chloride 108 mmol/L      CO2 24 mmol/L      ANION GAP 5 mmol/L      BUN 20 mg/dL      Creatinine 1.14 mg/dL      Glucose 134 mg/dL      Calcium 8.2 mg/dL      Corrected Calcium 9.2 mg/dL      AST 13 U/L      ALT 9 U/L      Alkaline Phosphatase 44 U/L      Total Protein 5.1 g/dL      Albumin 2.8 g/dL      Total Bilirubin 0.93 mg/dL      eGFR 59 ml/min/1.73sq m     Narrative:      National Kid[employer] guidelines for Chronic Kidney Disease (CKD):     Stage 1 with normal or high GFR (GFR > 90 mL/min/1.73 square meters)    Stage 2 Mild CKD (GFR = 60-89 mL/min/1.73 square meters)    Stage 3A Moderate CKD (GFR = 45-59 mL/min/1.73 square meters)    Stage 3B Moderate CKD (GFR = 30-44 mL/min/1.73 square meters)    Stage 4 Severe CKD (GFR = 15-29 mL/min/1.73 square meters)    Stage 5 End Stage CKD (GFR <15 mL/min/1.73 square meters)  Note: GFR calculation is accurate only with a steady state creatinine    Procalcitonin [[id number]]  (Normal) Collected: 01/27/25 1135    Lab Status: Final result Specimen: Blood from Arm, Left Updated: 01/27/25 1220     Procalcitonin 0.21 ng/ml     Lactic acid [[id number]]  (Normal) Collected: 01/27/25 1135     Lab Status: Final result Specimen: Blood from Arm, Left Updated: 01/27/25 1209     LACTIC ACID 0.9 mmol/L     Narrative:      Result may be elevated if tourniquet was used during collection.    Protime-INR [[id number]]  (Normal) Collected: 01/27/25 1135    Lab Status: Final result Specimen: Blood from Arm, Left Updated: 01/27/25 1206     Protime 14.9 seconds      INR 1.11    Narrative:      INR Therapeutic Range    Indication                                             INR Range      Atrial Fibrillation                                               2.0-3.0  Hypercoagulable State                                    2.0.2.3  Left Ventricular Asist Device                            2.0-3.0  Mechanical Heart Valve                                  -    Aortic(with afib, MI, embolism, HF, LA enlargement,    and/or coagulopathy)                                     2.0-3.0 (2.5-3.5)     Mitral                                                             2.5-3.5  Prosthetic/Bioprosthetic Heart Valve               2.0-3.0  Venous thromboembolism (VTE: VT, PE        2.0-3.0    APTT [[id number]]  (Abnormal) Collected: 01/27/25 1135    Lab Status: Final result Specimen: Blood from Arm, Left Updated: 01/27/25 1206     PTT 35 seconds     CBC and differential [[id number]]  (Abnormal) Collected: 01/27/25 1135    Lab Status: Final result Specimen: Blood from Arm, Left Updated: 01/27/25 1152     WBC 11.38 Thousand/uL      RBC 4.05 Million/uL      Hemoglobin 13.6 g/dL      Hematocrit 40.4 %       fL      MCH 33.6 pg      MCHC 33.7 g/dL      RDW 13.2 %      MPV 9.6 fL      Platelets 135 Thousands/uL      nRBC 0 /100 WBCs      Segmented % 86 %      Immature Grans % 1 %      Lymphocytes % 4 %      Monocytes % 9 %      Eosinophils Relative 0 %      Basophils Relative 0 %      Absolute Neutrophils 9.79 Thousands/µL      Absolute Immature Grans 0.07 Thousand/uL      Absolute Lymphocytes 0.44 Thousands/µL      Absolute Monocytes 1.06  Thousand/µL      Eosinophils Absolute 0.00 Thousand/µL      Basophils Absolute 0.02 Thousands/µL             XR forearm 2 views RIGHT   Final Interpretation by Minor Robbins MD (01/27 1218)      No acute osseous abnormality.         Computerized Assisted Algorithm (CAA) may have been used to analyze all applicable images.            Workstation performed: [id number]         XR chest portable   Final Interpretation by Minor Robbins MD (01/27 1223)      No acute cardiopulmonary disease.            Workstation performed: [id number]         XR wrist 3+ views RIGHT   Final Interpretation by Minor Robbins MD (01/27 1219)      No acute osseous abnormality.         Computerized Assisted Algorithm (CAA) may have been used to analyze all applicable images.            Workstation performed: [id number]             Procedures    ED Medication and Procedure Management   Prior to Admission Medications   Prescriptions Last Dose Informant Patient Reported? Taking?   apixaban (Eliquis) 5 mg   No No   Sig: Take 1 tablet (5 mg total) by mouth 2 (two) times a day   furosemide (LASIX) 40 mg tablet  Self Yes No   Sig: Take 40 mg by mouth daily   metoprolol succinate (TOPROL-XL) 50 mg 24 hr tablet   No No   Sig: Take 1 tablet (50 mg total) by mouth daily   tamsulosin (FLOMAX) 0.4 mg  Self Yes No   Sig: Take 0.4 mg by mouth daily with dinner      Facility-Administered Medications: None     Discharge Medication List as of 1/27/2025  2:07 PM        START taking these medications    Details   cephalexin (KEFLEX) 500 mg capsule Take 1 capsule (500 mg total) by mouth every 6 (six) hours for 7 days, Starting Mon 1/27/2025, Until Mon 2/3/2025, Normal      sulfamethoxazole-trimethoprim (BACTRIM DS) 800-160 mg per tablet Take 1 tablet by mouth 2 (two) times a day for 10 days smx-tmp DS (BACTRIM) 800-160 mg tabs (1tab q12 D10), Starting Mon 1/27/2025, Until u 2/6/2025, Normal           CONTINUE these medications which have NOT  CHANGED    Details   apixaban (Eliquis) 5 mg Take 1 tablet (5 mg total) by mouth 2 (two) times a day, Starting Mon 11/25/2024, Normal      furosemide (LASIX) 40 mg tablet Take 40 mg by mouth daily, Starting Tue 3/19/2024, Historical Med      metoprolol succinate (TOPROL-XL) 50 mg 24 hr tablet Take 1 tablet (50 mg total) by mouth daily, Starting Mon 11/25/2024, Normal      tamsulosin (FLOMAX) 0.4 mg Take 0.4 mg by mouth daily with dinner, Historical Med           No discharge procedures on file.  ED SEPSIS DOCUMENTATION   Time reflects when diagnosis was documented in both MDM as applicable and the Disposition within this note       Time User Action Codes Description Comment    1/27/2025  2:02 PM Sunshine Suarez Add [L03.113] Cellulitis of right upper extremity     1/27/2025  2:02 PM Sunshine Suarez Add [Z53.29] Left against medical advice     1/27/2025  2:02 PM Sunshine Suarez Modify [L03.113] Cellulitis of right upper extremity     1/27/2025  2:02 PM Sunshine Suarez Modify [Z53.29] Left against medical advice     1/27/2025  2:02 PM Sunshine Suarez Modify [L03.113] Cellulitis of right upper extremity     1/27/2025  2:02 PM Sunshine Suarez Modify [Z53.29] Left against medical advice                  Sunshine Suarez PA-C  01/28/25 [id number]

## 2025-01-27 NOTE — DISCHARGE INSTRUCTIONS
Please note that today, you are signing out AGAINST MEDICAL ADVICE. You have been thoroughly made aware of the risks including but not limited to, death, permament disability, delayed treatment, delayed diagnosis, infection, sepsis, worsening of the underlying condition, bleeding complications, progression to more severe illness, potential for surgical intervention, loss of bodily function, shock, severe pain or discomfort.    Please follow-up with your primary care provider soon as possible.  Please take the antibiotics that have been sent to your pharmacy.  Please return to the emergency department if you change your mind.    Please return to the emergency department immediately if you are experiencing worsening symptoms, pain, difficulty with moving your fingers, fever, chills, numbness, weakness, any new or worsening symptoms.

## 2025-01-28 ENCOUNTER — HOSPITAL ENCOUNTER (INPATIENT)
Facility: HOSPITAL | Age: 85
LOS: 4 days | Discharge: HOME HEALTH | DRG: 603 | End: 2025-02-01
Attending: STUDENT IN AN ORGANIZED HEALTH CARE EDUCATION/TRAINING PROGRAM | Admitting: INTERNAL MEDICINE
Payer: COMMERCIAL

## 2025-01-28 ENCOUNTER — APPOINTMENT (EMERGENCY)
Dept: CT IMAGING | Facility: HOSPITAL | Age: 85
DRG: 603 | End: 2025-01-28
Payer: COMMERCIAL

## 2025-01-28 DIAGNOSIS — M65.949 EXTENSOR TENOSYNOVITIS OF FINGER: ICD-10-CM

## 2025-01-28 DIAGNOSIS — L03.113 CELLULITIS OF RIGHT UPPER EXTREMITY: ICD-10-CM

## 2025-01-28 DIAGNOSIS — L03.113 CELLULITIS OF RIGHT HAND: Primary | ICD-10-CM

## 2025-01-28 LAB
ANION GAP SERPL CALCULATED.3IONS-SCNC: 7 MMOL/L (ref 4–13)
ATRIAL RATE: 64 BPM
BASOPHILS # BLD AUTO: 0.02 THOUSANDS/ΜL (ref 0–0.1)
BASOPHILS NFR BLD AUTO: 0 % (ref 0–1)
BUN SERPL-MCNC: 28 MG/DL (ref 5–25)
CALCIUM SERPL-MCNC: 8.5 MG/DL (ref 8.4–10.2)
CHLORIDE SERPL-SCNC: 104 MMOL/L (ref 96–108)
CO2 SERPL-SCNC: 24 MMOL/L (ref 21–32)
CREAT SERPL-MCNC: 1.36 MG/DL (ref 0.6–1.3)
EOSINOPHIL # BLD AUTO: 0 THOUSAND/ΜL (ref 0–0.61)
EOSINOPHIL NFR BLD AUTO: 0 % (ref 0–6)
ERYTHROCYTE [DISTWIDTH] IN BLOOD BY AUTOMATED COUNT: 13.2 % (ref 11.6–15.1)
GFR SERPL CREATININE-BSD FRML MDRD: 47 ML/MIN/1.73SQ M
GLUCOSE SERPL-MCNC: 97 MG/DL (ref 65–140)
HCT VFR BLD AUTO: 43.7 % (ref 36.5–49.3)
HGB BLD-MCNC: 14.7 G/DL (ref 12–17)
IMM GRANULOCYTES # BLD AUTO: 0.09 THOUSAND/UL (ref 0–0.2)
IMM GRANULOCYTES NFR BLD AUTO: 1 % (ref 0–2)
LYMPHOCYTES # BLD AUTO: 0.71 THOUSANDS/ΜL (ref 0.6–4.47)
LYMPHOCYTES NFR BLD AUTO: 6 % (ref 14–44)
MCH RBC QN AUTO: 33.3 PG (ref 26.8–34.3)
MCHC RBC AUTO-ENTMCNC: 33.6 G/DL (ref 31.4–37.4)
MCV RBC AUTO: 99 FL (ref 82–98)
MONOCYTES # BLD AUTO: 0.82 THOUSAND/ΜL (ref 0.17–1.22)
MONOCYTES NFR BLD AUTO: 7 % (ref 4–12)
NEUTROPHILS # BLD AUTO: 9.49 THOUSANDS/ΜL (ref 1.85–7.62)
NEUTS SEG NFR BLD AUTO: 86 % (ref 43–75)
NRBC BLD AUTO-RTO: 0 /100 WBCS
P AXIS: 83 DEGREES
PLATELET # BLD AUTO: 189 THOUSANDS/UL (ref 149–390)
PMV BLD AUTO: 10 FL (ref 8.9–12.7)
POTASSIUM SERPL-SCNC: 3.7 MMOL/L (ref 3.5–5.3)
PR INTERVAL: 152 MS
QRS AXIS: 81 DEGREES
QRSD INTERVAL: 112 MS
QT INTERVAL: 412 MS
QTC INTERVAL: 425 MS
RBC # BLD AUTO: 4.42 MILLION/UL (ref 3.88–5.62)
SODIUM SERPL-SCNC: 135 MMOL/L (ref 135–147)
T WAVE AXIS: 47 DEGREES
VENTRICULAR RATE: 64 BPM
WBC # BLD AUTO: 11.13 THOUSAND/UL (ref 4.31–10.16)

## 2025-01-28 PROCEDURE — 85025 COMPLETE CBC W/AUTO DIFF WBC: CPT | Performed by: PHYSICIAN ASSISTANT

## 2025-01-28 PROCEDURE — 96361 HYDRATE IV INFUSION ADD-ON: CPT

## 2025-01-28 PROCEDURE — 99285 EMERGENCY DEPT VISIT HI MDM: CPT | Performed by: PHYSICIAN ASSISTANT

## 2025-01-28 PROCEDURE — 73201 CT UPPER EXTREMITY W/DYE: CPT

## 2025-01-28 PROCEDURE — 96365 THER/PROPH/DIAG IV INF INIT: CPT

## 2025-01-28 PROCEDURE — 36415 COLL VENOUS BLD VENIPUNCTURE: CPT | Performed by: PHYSICIAN ASSISTANT

## 2025-01-28 PROCEDURE — 80048 BASIC METABOLIC PNL TOTAL CA: CPT | Performed by: PHYSICIAN ASSISTANT

## 2025-01-28 PROCEDURE — 87040 BLOOD CULTURE FOR BACTERIA: CPT | Performed by: PHYSICIAN ASSISTANT

## 2025-01-28 PROCEDURE — 99284 EMERGENCY DEPT VISIT MOD MDM: CPT

## 2025-01-28 PROCEDURE — 93010 ELECTROCARDIOGRAM REPORT: CPT | Performed by: INTERNAL MEDICINE

## 2025-01-28 RX ORDER — FUROSEMIDE 40 MG/1
40 TABLET ORAL DAILY
Status: DISCONTINUED | OUTPATIENT
Start: 2025-01-29 | End: 2025-02-01 | Stop reason: HOSPADM

## 2025-01-28 RX ORDER — ACETAMINOPHEN 325 MG/1
650 TABLET ORAL EVERY 6 HOURS PRN
Status: DISCONTINUED | OUTPATIENT
Start: 2025-01-28 | End: 2025-02-01 | Stop reason: HOSPADM

## 2025-01-28 RX ORDER — CEFEPIME HYDROCHLORIDE 2 G/50ML
2000 INJECTION, SOLUTION INTRAVENOUS ONCE
Status: COMPLETED | OUTPATIENT
Start: 2025-01-28 | End: 2025-01-28

## 2025-01-28 RX ORDER — VANCOMYCIN HYDROCHLORIDE 1 G/200ML
15 INJECTION, SOLUTION INTRAVENOUS EVERY 24 HOURS
Status: DISCONTINUED | OUTPATIENT
Start: 2025-01-29 | End: 2025-02-01 | Stop reason: HOSPADM

## 2025-01-28 RX ORDER — TAMSULOSIN HYDROCHLORIDE 0.4 MG/1
0.4 CAPSULE ORAL
Status: DISCONTINUED | OUTPATIENT
Start: 2025-01-29 | End: 2025-02-01 | Stop reason: HOSPADM

## 2025-01-28 RX ORDER — METOPROLOL SUCCINATE 50 MG/1
50 TABLET, EXTENDED RELEASE ORAL DAILY
Status: DISCONTINUED | OUTPATIENT
Start: 2025-01-29 | End: 2025-02-01 | Stop reason: HOSPADM

## 2025-01-28 RX ORDER — CEFEPIME HYDROCHLORIDE 2 G/50ML
2000 INJECTION, SOLUTION INTRAVENOUS EVERY 12 HOURS
Status: DISCONTINUED | OUTPATIENT
Start: 2025-01-29 | End: 2025-02-01 | Stop reason: HOSPADM

## 2025-01-28 RX ADMIN — SODIUM CHLORIDE 500 ML: 0.9 INJECTION, SOLUTION INTRAVENOUS at 21:40

## 2025-01-28 RX ADMIN — VANCOMYCIN HYDROCHLORIDE 1500 MG: 5 INJECTION, POWDER, LYOPHILIZED, FOR SOLUTION INTRAVENOUS at 22:45

## 2025-01-28 RX ADMIN — CEFEPIME HYDROCHLORIDE 2000 MG: 2 INJECTION, SOLUTION INTRAVENOUS at 18:25

## 2025-01-28 RX ADMIN — IOHEXOL 100 ML: 350 INJECTION, SOLUTION INTRAVENOUS at 19:08

## 2025-01-29 LAB
ANION GAP SERPL CALCULATED.3IONS-SCNC: 6 MMOL/L (ref 4–13)
BACTERIA UR QL AUTO: ABNORMAL /HPF
BASOPHILS # BLD AUTO: 0.03 THOUSANDS/ΜL (ref 0–0.1)
BASOPHILS NFR BLD AUTO: 0 % (ref 0–1)
BILIRUB UR QL STRIP: NEGATIVE
BUN SERPL-MCNC: 25 MG/DL (ref 5–25)
CALCIUM SERPL-MCNC: 8 MG/DL (ref 8.4–10.2)
CHLORIDE SERPL-SCNC: 110 MMOL/L (ref 96–108)
CLARITY UR: CLEAR
CO2 SERPL-SCNC: 22 MMOL/L (ref 21–32)
COLOR UR: YELLOW
CREAT SERPL-MCNC: 1.17 MG/DL (ref 0.6–1.3)
EOSINOPHIL # BLD AUTO: 0.01 THOUSAND/ΜL (ref 0–0.61)
EOSINOPHIL NFR BLD AUTO: 0 % (ref 0–6)
ERYTHROCYTE [DISTWIDTH] IN BLOOD BY AUTOMATED COUNT: 13.2 % (ref 11.6–15.1)
GFR SERPL CREATININE-BSD FRML MDRD: 57 ML/MIN/1.73SQ M
GLUCOSE SERPL-MCNC: 93 MG/DL (ref 65–140)
GLUCOSE UR STRIP-MCNC: NEGATIVE MG/DL
HCT VFR BLD AUTO: 35.6 % (ref 36.5–49.3)
HGB BLD-MCNC: 12 G/DL (ref 12–17)
HGB UR QL STRIP.AUTO: NEGATIVE
IMM GRANULOCYTES # BLD AUTO: 0.05 THOUSAND/UL (ref 0–0.2)
IMM GRANULOCYTES NFR BLD AUTO: 1 % (ref 0–2)
KETONES UR STRIP-MCNC: ABNORMAL MG/DL
LEUKOCYTE ESTERASE UR QL STRIP: NEGATIVE
LYMPHOCYTES # BLD AUTO: 0.68 THOUSANDS/ΜL (ref 0.6–4.47)
LYMPHOCYTES NFR BLD AUTO: 9 % (ref 14–44)
MAGNESIUM SERPL-MCNC: 2 MG/DL (ref 1.9–2.7)
MCH RBC QN AUTO: 33.3 PG (ref 26.8–34.3)
MCHC RBC AUTO-ENTMCNC: 33.7 G/DL (ref 31.4–37.4)
MCV RBC AUTO: 99 FL (ref 82–98)
MONOCYTES # BLD AUTO: 0.75 THOUSAND/ΜL (ref 0.17–1.22)
MONOCYTES NFR BLD AUTO: 10 % (ref 4–12)
MUCOUS THREADS UR QL AUTO: ABNORMAL
NEUTROPHILS # BLD AUTO: 5.81 THOUSANDS/ΜL (ref 1.85–7.62)
NEUTS SEG NFR BLD AUTO: 80 % (ref 43–75)
NITRITE UR QL STRIP: NEGATIVE
NON-SQ EPI CELLS URNS QL MICRO: ABNORMAL /HPF
NRBC BLD AUTO-RTO: 0 /100 WBCS
PH UR STRIP.AUTO: 6.5 [PH]
PLATELET # BLD AUTO: 156 THOUSANDS/UL (ref 149–390)
PMV BLD AUTO: 10.3 FL (ref 8.9–12.7)
POTASSIUM SERPL-SCNC: 3.5 MMOL/L (ref 3.5–5.3)
PROT UR STRIP-MCNC: ABNORMAL MG/DL
RBC # BLD AUTO: 3.6 MILLION/UL (ref 3.88–5.62)
RBC #/AREA URNS AUTO: ABNORMAL /HPF
SODIUM SERPL-SCNC: 138 MMOL/L (ref 135–147)
SP GR UR STRIP.AUTO: 1.01 (ref 1–1.03)
UROBILINOGEN UR STRIP-ACNC: <2 MG/DL
WBC # BLD AUTO: 7.33 THOUSAND/UL (ref 4.31–10.16)
WBC #/AREA URNS AUTO: ABNORMAL /HPF

## 2025-01-29 PROCEDURE — 36415 COLL VENOUS BLD VENIPUNCTURE: CPT | Performed by: INTERNAL MEDICINE

## 2025-01-29 PROCEDURE — 85025 COMPLETE CBC W/AUTO DIFF WBC: CPT | Performed by: INTERNAL MEDICINE

## 2025-01-29 PROCEDURE — 99222 1ST HOSP IP/OBS MODERATE 55: CPT | Performed by: HOSPITALIST

## 2025-01-29 PROCEDURE — NC001 PR NO CHARGE: Performed by: ORTHOPAEDIC SURGERY

## 2025-01-29 PROCEDURE — 81001 URINALYSIS AUTO W/SCOPE: CPT | Performed by: INTERNAL MEDICINE

## 2025-01-29 PROCEDURE — 80048 BASIC METABOLIC PNL TOTAL CA: CPT | Performed by: INTERNAL MEDICINE

## 2025-01-29 PROCEDURE — 0J9J0ZZ DRAINAGE OF RIGHT HAND SUBCUTANEOUS TISSUE AND FASCIA, OPEN APPROACH: ICD-10-PCS | Performed by: HOSPITALIST

## 2025-01-29 PROCEDURE — 87086 URINE CULTURE/COLONY COUNT: CPT | Performed by: INTERNAL MEDICINE

## 2025-01-29 PROCEDURE — 83735 ASSAY OF MAGNESIUM: CPT | Performed by: INTERNAL MEDICINE

## 2025-01-29 PROCEDURE — 99222 1ST HOSP IP/OBS MODERATE 55: CPT | Performed by: ORTHOPAEDIC SURGERY

## 2025-01-29 RX ORDER — LIDOCAINE HYDROCHLORIDE 10 MG/ML
20 INJECTION, SOLUTION EPIDURAL; INFILTRATION; INTRACAUDAL; PERINEURAL ONCE
Status: DISCONTINUED | OUTPATIENT
Start: 2025-01-29 | End: 2025-02-01 | Stop reason: HOSPADM

## 2025-01-29 RX ADMIN — VANCOMYCIN HYDROCHLORIDE 1000 MG: 1 INJECTION, SOLUTION INTRAVENOUS at 18:04

## 2025-01-29 RX ADMIN — APIXABAN 5 MG: 5 TABLET, FILM COATED ORAL at 02:13

## 2025-01-29 RX ADMIN — CEFEPIME HYDROCHLORIDE 2000 MG: 2 INJECTION, SOLUTION INTRAVENOUS at 17:22

## 2025-01-29 RX ADMIN — ACETAMINOPHEN 650 MG: 325 TABLET, FILM COATED ORAL at 09:50

## 2025-01-29 RX ADMIN — METOPROLOL SUCCINATE 50 MG: 50 TABLET, EXTENDED RELEASE ORAL at 09:50

## 2025-01-29 RX ADMIN — CEFEPIME HYDROCHLORIDE 2000 MG: 2 INJECTION, SOLUTION INTRAVENOUS at 07:23

## 2025-01-29 RX ADMIN — ACETAMINOPHEN 650 MG: 325 TABLET, FILM COATED ORAL at 19:38

## 2025-01-29 RX ADMIN — FUROSEMIDE 40 MG: 40 TABLET ORAL at 09:50

## 2025-01-29 NOTE — CONSULTS
"Consultation - Orthopedics   Name: Daniel Martins 84 y.o. male I MRN: [id number]  Unit/Bed#: O[facility] 01 I Date of Admission: 1/28/2025   Date of Service: 1/29/2025 I Hospital Day: 1   Inpatient consult to Orthopedic Surgery  Consult performed by: Erika Varela PA-C  Consult ordered by: Merle Terry PA-C        Physician Requesting Evaluation: Logan Jaimes MD   Reason for Evaluation / Principal Problem: Right hand swelling and cellulitis    Assessment & Plan  Cellulitis of right upper extremity  - Right hand pain and swelling secondary to superficial dorsal hand abscess with cellulitis extending from the hand to the upper arm.  - Discussed case with Dr. Field who recommended bedside I&D of the abscess.  I&D performed at the bedside today and packing placed.   - Plan to remove packing tomorrow and begin warm soapy soaks three times a day.  - Continue IV antibiotics per SLIM  - Pain control per primary  - Medical management per primary  - Ortho will continue to follow closely to monitor improvement of abscess and cellulitis with IV antibiotics and I&D.      Orthopedics service will follow.  Please contact the SecureChat role for the Orthopedics service with any questions/concerns.    History of Present Illness   HPI: Daniel Martins is a 84 y.o. year old male who presents with Right hand swelling and pain that began about 5 days ago.  He states that he noticed swelling of the right hand that began to worsen and extend up his arm.  He has difficulty with ROM of his fingers and developed a \"bubble\" over the dorsum of the hand.  He denies any injury, bite, or wound to the hand prior to this incident.  He was seen in the ER on 1/27, but left the hospital AMA.  He came back to the ER due to worsening symptoms.  He states that previously he had cellulitis of the left hand and arm which presented similarly.    Review of Systems significant for findings described in the HPI.  I have reviewed the patient's PMH, PSH, Social " "History, Family History, Meds, and Allergies    Objective :  Temp:  [98.2 °F (36.8 °C)] 98.2 °F (36.8 °C)  HR:  [79-92] 88  BP: (129-159)/(70-82) 159/78  Resp:  [16-18] 16  SpO2:  [97 %-98 %] 97 %  O2 Device: None (Room air)  Physical Exam  Vitals reviewed.   Constitutional:       Appearance: Normal appearance.   HENT:      Head: Normocephalic.   Pulmonary:      Effort: Pulmonary effort is normal.   Neurological:      General: No focal deficit present.      Mental Status: He is alert and oriented to person, place, and time.   Psychiatric:         Mood and Affect: Mood normal.     Right Hand Exam     Tenderness   The patient is experiencing tenderness in the dorsal area (worst over fluctuant area over 4th met).    Other   Erythema: present  Sensation: normal  Pulse: present    Comments:  Erythema extending from the dorsum of the right hand up the arm to the area of the shoulder- photo available in media tab.  No erythema over the volar aspect of the hand.  Erythema area marked with surgical pen.  +fluctuance and palpable abscess over the dorsum of the hand over the 4th met- photos available in media tab.  Difficulty with flexion of fingers and ability to make a full fist limited by pain and swelling  Minimal pain with ROM of the wrist  No pain with ROM of the elbow.               Lab Results: I have reviewed the following results:   Recent Labs     01/27/25  1135 01/27/25  1335 01/28/25  1758 01/29/25  0607   WBC 11.38*  --  11.13* 7.33   HGB 13.6  --  14.7 12.0   HCT 40.4  --  43.7 35.6*   *  --  189 156   BUN  --  20 28* 25   CREATININE  --  1.14 1.36* 1.17   PTT 35*  --   --   --    INR 1.11  --   --   --      Blood Culture:   Lab Results   Component Value Date    BLOODCX Received in Microbiology Lab. Culture in Progress. 01/28/2025     Wound Culture: No results found for: \"WOUNDCULT\"    Imaging Results Review: I personally reviewed the following image studies in PACS and associated radiology reports: CT " Right hand. My interpretation of the radiology images/reports is: Extensive diffuse subcutaneous edema throughout the right upper extremity. There is more focal fluid collecting along the extensor digitorum tendons of the third and fourth fingers. This either suggests a developing abscess and/or tenosynovitis . Xray right wrist: No acture fracture, dislocation, or foreign body noted.  Other Study Results Review: No additional pertinent studies reviewed.        Erika Varela PA-C

## 2025-01-29 NOTE — H&P
H&P - Hospitalist   Name: Daniel Martins 84 y.o. male I MRN: [id number]  Unit/Bed#: Z1 H3 I Date of Admission: 1/28/2025   Date of Service: 1/29/2025 I Hospital Day: 1     Assessment & Plan  Cellulitis of right upper extremity  Presented due to right hand swelling, pain and erythema  WBC 11.13.  Only SIRS criteria is tachycardia (92)  CT upper extremity with contrast right  Extensive diffuse subcutaneous edema throughout the right upper extremity. There is more focal fluid collecting along the extensor digitorum tendons of the third and fourth fingers. This either suggests a developing abscess and/or tenosynovitis. This region is suboptimally assessed due to the large field-of-view. If clinically indicated, further dedicated imaging of the left hand with gadolinium enhanced MRI should be considered.  ER discussed case with hand who recommended  Cefepime/vancomycin  Ortho consult  Manage pain          Hypertension  Home regimen:  Metoprolol succinate 50 mg daily  Lasix 40 mg daily  Atrial fibrillation (HCC)  Home regimen:  Metoprolol succinate 50 mg daily  Eliquis 5 mg twice daily  Pacemaker  Pacemaker in place  History of sick sinus syndrome      VTE Pharmacologic Prophylaxis: VTE Score: 3 Moderate Risk (Score 3-4) - Pharmacological DVT Prophylaxis Ordered: apixaban (Eliquis).  Code Status: Level 1 - Full Code   Discussion with family: Patient declined call to .     Anticipated Length of Stay: Patient will be admitted on an inpatient basis with an anticipated length of stay of greater than 2 midnights secondary to cellulitis of right upper extremity .    History of Present Illness   Chief Complaint: swelling of right arm    Daniel Martins is a 84 y.o. male with a PMH of atrial fibrillation, hypertension, pacemaker who presents from home due to redness, swelling and pain that starts at his right hand that extends up to his right arm.  Denies fevers or chills.  Was seen in the ER also on 1/27.  Had been  "recommended for admission but left AGAINST MEDICAL ADVICE.  Discharged on oral antibiotics.  Patient returns with significant discomfort.  CT performed showing \"diffuse subcutaneous edema throughout the right upper extremity, possible developing abscess and/or tenosynovitis\".  ER discussed case with hand surgery who recommended admission at [facility], IV cefepime and vancomycin with an Ortho consult.  Patient agreeable with plan    Review of Systems   Constitutional:  Negative for fatigue and fever.   HENT:  Negative for sore throat.    Respiratory:  Negative for cough, chest tightness and shortness of breath.    Cardiovascular:  Negative for chest pain.   Gastrointestinal:  Negative for abdominal distention, abdominal pain, diarrhea, nausea and vomiting.   Genitourinary:  Negative for difficulty urinating.   Musculoskeletal:  Negative for arthralgias.        Right arm pain and swelling   Skin:  Positive for color change.   Neurological:  Negative for weakness and headaches.   Psychiatric/Behavioral:  Negative for agitation and behavioral problems.    All other systems reviewed and are negative.      Historical Information   Past Medical History:   Diagnosis Date    A-fib (HCC)     Hypertension     Lyme disease      Past Surgical History:   Procedure Laterality Date    CARDIAC PACEMAKER PLACEMENT  10/2019     Social History     Tobacco Use    Smoking status: Never    Smokeless tobacco: Never   Vaping Use    Vaping status: Never Used   Substance and Sexual Activity    Alcohol use: Yes     Comment: socially    Drug use: Never    Sexual activity: Not on file     E-Cigarette/Vaping    E-Cigarette Use Never User      E-Cigarette/Vaping Substances     History reviewed. No pertinent family history.  Social History:  Marital Status: /Civil Union   Occupation: unknown   Patient Pre-hospital Living Situation: Home  Patient Pre-hospital Level of Mobility: walks  Patient Pre-hospital Diet Restrictions: " regular    Meds/Allergies   I have reviewed home medications with patient personally.  Prior to Admission medications    Medication Sig Start Date End Date Taking? Authorizing Provider   apixaban (Eliquis) 5 mg Take 1 tablet (5 mg total) by mouth 2 (two) times a day 11/25/24   Bertrand Cordero MD   cephalexin (KEFLEX) 500 mg capsule Take 1 capsule (500 mg total) by mouth every 6 (six) hours for 7 days 1/27/25 2/3/25  Sunshine Suarez PA-C   furosemide (LASIX) 40 mg tablet Take 40 mg by mouth daily 3/19/24   Historical Provider, MD   metoprolol succinate (TOPROL-XL) 50 mg 24 hr tablet Take 1 tablet (50 mg total) by mouth daily 11/25/24   Bertrand Cordero MD   sulfamethoxazole-trimethoprim (BACTRIM DS) 800-160 mg per tablet Take 1 tablet by mouth 2 (two) times a day for 10 days smx-tmp DS (BACTRIM) 800-160 mg tabs (1tab q12 D10) 1/27/25 2/6/25  Sunshine Suarez PA-C   tamsulosin (FLOMAX) 0.4 mg Take 0.4 mg by mouth daily with dinner    Historical Provider, MD     No Known Allergies    Objective :  Temp:  [98.2 °F (36.8 °C)] 98.2 °F (36.8 °C)  HR:  [79-92] 79  BP: (129-155)/(70-82) 155/72  Resp:  [16-18] 17  SpO2:  [97 %-98 %] 98 %  O2 Device: None (Room air)    Physical Exam  Vitals and nursing note reviewed.   Constitutional:       Appearance: Normal appearance.   HENT:      Head: Normocephalic.   Eyes:      Extraocular Movements: Extraocular movements intact.      Pupils: Pupils are equal, round, and reactive to light.   Cardiovascular:      Rate and Rhythm: Normal rate and regular rhythm.      Heart sounds: No murmur heard.     No gallop.   Pulmonary:      Effort: No respiratory distress.      Breath sounds: Normal breath sounds. No wheezing.   Abdominal:      General: Bowel sounds are normal. There is no distension.      Tenderness: There is no abdominal tenderness.   Musculoskeletal:         General: Swelling (right hand through shoulder. unable to make a fist. Fingertips are warm to to touch. has sensation)  "and tenderness present. Normal range of motion.      Cervical back: Normal range of motion.   Skin:     General: Skin is warm.      Findings: Erythema present.   Neurological:      General: No focal deficit present.      Mental Status: He is alert and oriented to person, place, and time. Mental status is at baseline.   Psychiatric:         Mood and Affect: Mood normal.         Behavior: Behavior normal.         Thought Content: Thought content normal.          Lines/Drains:            Lab Results: I have reviewed the following results:  Results from last 7 days   Lab Units 01/28/25  1758   WBC Thousand/uL 11.13*   HEMOGLOBIN g/dL 14.7   HEMATOCRIT % 43.7   PLATELETS Thousands/uL 189   SEGS PCT % 86*   LYMPHO PCT % 6*   MONO PCT % 7   EOS PCT % 0     Results from last 7 days   Lab Units 01/28/25  1758 01/27/25  1335   SODIUM mmol/L 135 137   POTASSIUM mmol/L 3.7 3.7   CHLORIDE mmol/L 104 108   CO2 mmol/L 24 24   BUN mg/dL 28* 20   CREATININE mg/dL 1.36* 1.14   ANION GAP mmol/L 7 5   CALCIUM mg/dL 8.5 8.2*   ALBUMIN g/dL  --  2.8*   TOTAL BILIRUBIN mg/dL  --  0.93   ALK PHOS U/L  --  44   ALT U/L  --  9   AST U/L  --  13   GLUCOSE RANDOM mg/dL 97 134     Results from last 7 days   Lab Units 01/27/25  1135   INR  1.11         No results found for: \"HGBA1C\"  Results from last 7 days   Lab Units 01/27/25  1135   LACTIC ACID mmol/L 0.9   PROCALCITONIN ng/ml 0.21       Imaging Results Review: I reviewed radiology reports from this admission including: CT RUE.  Other Study Results Review: No additional pertinent studies reviewed.    Administrative Statements   I have spent a total time of 50 minutes in caring for this patient on the day of the visit/encounter including Risks and benefits of tx options, Instructions for management, and Documenting in the medical record.    ** Please Note: This note has been constructed using a voice recognition system. **    "

## 2025-01-29 NOTE — ASSESSMENT & PLAN NOTE
- Right hand pain and swelling secondary to superficial dorsal hand abscess with cellulitis extending from the hand to the upper arm.  - Discussed case with Dr. Field who recommended bedside I&D of the abscess.  I&D performed at the bedside today and packing placed.   - Plan to remove packing tomorrow and begin warm soapy soaks three times a day.  - Continue IV antibiotics per SLIM  - Pain control per primary  - Medical management per primary  - Ortho will continue to follow closely to monitor improvement of abscess and cellulitis with IV antibiotics and I&D.

## 2025-01-29 NOTE — PROGRESS NOTES
Daniel Martins is a 84 y.o. male who is currently ordered Vancomycin IV with management by the Pharmacy Consult service.  Relevant clinical data and objective / subjective history reviewed.  Vancomycin Assessment:  Indication and Goal AUC/Trough: Soft tissue (goal -600, trough >10), -600, trough >10  Clinical Status:  Initial dosing  Micro:   Results pending  Renal Function:  SCr: 1.36 mg/dL  CrCl: 42.3 mL/min  Renal replacement: Not on dialysis  Days of Therapy: 1  Current Dose: 1500mg loading dose  Vancomycin Plan:  New Dosinmg q24h, starting at 1800  Estimated AUC: 485 mcg*hr/mL  Estimated Trough: 13.6 mcg/mL  Next Level: 0600 on   Renal Function Monitoring: Daily BMP and UOP  Pharmacy will continue to follow closely for s/sx of nephrotoxicity, infusion reactions and appropriateness of therapy.  BMP and CBC will be ordered per protocol. We will continue to follow the patient’s culture results and clinical progress daily.    Rich Carias, Pharmacist

## 2025-01-29 NOTE — PROCEDURES
Wound Care Nurse Visit Note        Yessy Puckett  Age: 71 y.o.  YOB: 1952    Today's Date:  3/12/2024    Patient presents today per physician order for a scheduled nurse visit.    Orders to follow same dressings as previous visit and to report any changes in conditions if noted.    /64   Pulse 65   Temp 97.3 °F (36.3 °C) (Temporal)   Resp 20   SpO2 97%       Wound Assessment:      Wound 12/12/22 Leg Left #1 Circumferential (Active)   Wound Image   02/28/24 1259   Wound Etiology Venous 02/28/24 1259   Dressing Status Other (Comment) 02/28/24 1259   Wound Cleansed Cleansed with saline 02/21/24 1309   Dressing/Treatment Collagen;Alginate with Ag 02/28/24 1344   Wound Length (cm) 2.4 cm 02/28/24 1259   Wound Width (cm) 3 cm 02/28/24 1259   Wound Depth (cm) 0.1 cm 02/28/24 1259   Wound Surface Area (cm^2) 7.2 cm^2 02/28/24 1259   Change in Wound Size % (l*w) 89.33 02/28/24 1259   Wound Volume (cm^3) 0.72 cm^3 02/28/24 1259   Wound Healing % 89 02/28/24 1259   Post-Procedure Length (cm) 2.4 cm 02/28/24 1315   Post-Procedure Width (cm) 3 cm 02/28/24 1315   Post-Procedure Depth (cm) 0.1 cm 02/28/24 1315   Post-Procedure Surface Area (cm^2) 7.2 cm^2 02/28/24 1315   Post-Procedure Volume (cm^3) 0.72 cm^3 02/28/24 1315   Wound Assessment Slough;Granulation tissue 02/28/24 1259   Drainage Amount Moderate (25-50%) 02/28/24 1259   Drainage Description Serosanguinous 02/28/24 1259   Odor None 02/28/24 1259   Pia-wound Assessment Intact 02/28/24 1259   Margins Epibole (rolled edges) 02/28/24 1259   Wound Thickness Description not for Pressure Injury Full thickness 02/28/24 1259   Number of days: 455       Wound 07/24/23 Leg Right;Posterior #2 (Active)   Wound Image   02/28/24 1257   Wound Etiology Venous 02/28/24 1257   Dressing Status Other (Comment) 02/28/24 1257   Wound Cleansed Cleansed with saline 02/28/24 1257   Dressing/Treatment Collagen;Alginate  "Procedure: Incision and drainage of right dorsal hand abscess   After informed consent was obtained a local block of 10cc 1% lidocaine was given to the dorsal hand . The area was then sterilely prepped and draped. Once adequate anesthesia was obtained, a 2.5cm longitudinal incision was made with an 11 blade. Immediately, copious purulence was encountered.  A hemostat was inserted into the wound and expanded to break up loculations. The wound was then irrigated with NS. 1/4\" packing was inserted into the wound. The wound was dressed with 4x4 and brenden wrap. Pt tolerated the procedure well and was neurovascularly intact pre and post procedure.    He will have his packing pulled tomorrow and at that time, he will begin soapy soaks TID.    "

## 2025-01-29 NOTE — UTILIZATION REVIEW
"Initial Clinical Review    Admission: Date/Time/Statement:   Admission Orders (From admission, onward)       Ordered        01/28/25 2226  INPATIENT ADMISSION  Once                          Orders Placed This Encounter   Procedures    INPATIENT ADMISSION     Standing Status:   Standing     Number of Occurrences:   1     Level of Care:   Med Surg [16]     Estimated length of stay:   More than 2 Midnights     Certification:   I certify that inpatient services are medically necessary for this patient for a duration of greater than two midnights. See H&P and MD Progress Notes for additional information about the patient's course of treatment.     ED Arrival Information       Expected   -    Arrival   1/28/2025 15:17    Acuity   Urgent              Means of arrival   Walk-In    Escorted by   Self    Service   Hospitalist    Admission type   Emergency              Arrival complaint   R hand & arm swelling             Chief Complaint   Patient presents with    Hand Swelling     Pt coming from home for right hand swelling. Pt seen here yesterday for same and told to stay. Pt states \"I wasn't able to stay I told them I would come back. So here I am.\"       Initial Presentation: 84 y.o. male  to ED via walk in from home.    Admitted to inpatient with Dx: Cellulitis of Right Upper extremity.  Presented to ED with persistent redness, swelling of right hand and forearm starting few days prior to arrival, seen in ED yesterday for same and signed out AMA . PMHx: atrial fibrillation, hypertension, pacemaker.   On exam: Significant swelling, erythema of right arm diffusely extending from forearm into hand. He has more significant erythema and swelling over dorsum of his hand in particular. He has pain with flexion-extension of his wrist and in particular flexion and extension of his third and fourth digits. Grossly intact cap refill and sensation distally. Normal range of motion of right shoulder and elbow.   Bun 28. Creatinine " 1.36.   wbc 11.13.   Imaging shows possible developing abscess or tenosynovitis of RUE.   ED treatment:  given IVF bolus and started on cefepime and vancomycin.    Plan includes  to continue cefepime and vancomycin.  Consult Orthopedics.  Manage pain.           Anticipated Length of Stay/Certification Statement: Patient will be admitted on an inpatient basis with an anticipated length of stay of greater than 2 midnights secondary to cellulitis of right upper extremity .     Date: 1/29/25   Day 2:  wbc 7.33.  on exam:  Swelling of right hand through shoulder. unable to make a fist. Fingertips are warm to to touch. has sensation and tenderness present.         Per Orthopedics 1/29/25 - Cellulitis of RUE.  Suspect has superficial dorsal hand abscess with cellulitis extending from the hand to the upper arm.   Recommend bedside I&D.   Continue IV antibiotics and pain control  1/29/25 Procedure Incision and drainage of right dorsal hand abscess  Findings  copious purulence was encountered.      Patient has crossed 3 midnights and requires ongoing care    1/30/2025 .  Patient presents with improving arm pain since I&D yesterday.  Still with pain in right hand, wrist and elbow but not as bad.  Better movement of hand and wrist.     On exam Musculoskeletal: right upper  Dressing with mild serosanguineous staining.  Removed, packing in place  Skin continued erythema extending from the dorsal hand up to the upper arm.  Appears stable compared to lying marked yesterday.   Tenderness over the dorsal hand but no tenderness proximally  No further fluctuance of the dorsal hand, no other areas of fluctuance noted throughout the upper extremity  No pain with wrist or finger range of motion.  Finger range of motion limited but he has full passive motion of the fingers.  His wrist ROM arc is 45 degrees  Warm well-perfused fingertips  Abnormal labs or imaging:  wbc 7.33  Diagnosis/Plan    Cellulitis of RUE.  Day 1 day status post  abscess I&D at bedside with wound packing.  Start soaks today.   Continue antibiotics and pain control     ED Treatment-Medication Administration from 01/28/2025 1516 to 01/29/2025 1016         Date/Time Order Dose Route Action     01/28/2025 1825 cefepime (MAXIPIME) IVPB (premix in dextrose) 2,000 mg 50 mL 2,000 mg Intravenous New Bag     01/28/2025 1908 iohexol (OMNIPAQUE) 350 MG/ML injection (MULTI-DOSE) 100 mL 100 mL Intravenous Given     01/28/2025 2140 sodium chloride 0.9 % bolus 500 mL 500 mL Intravenous New Bag     01/28/2025 2245 vancomycin (VANCOCIN) 1500 mg in sodium chloride 0.9% 250 mL IVPB 1,500 mg Intravenous New Bag     01/29/2025 0213 apixaban (ELIQUIS) tablet 5 mg 5 mg Oral Given     01/29/2025 1800 apixaban (ELIQUIS) tablet 5 mg -- Oral Held Dose     01/30/2025 0900 apixaban (ELIQUIS) tablet 5 mg -- Oral Held Dose     01/30/2025 1800 apixaban (ELIQUIS) tablet 5 mg -- Oral Held Dose     01/31/2025 0900 apixaban (ELIQUIS) tablet 5 mg -- Oral Held Dose     01/31/2025 1800 apixaban (ELIQUIS) tablet 5 mg -- Oral Held Dose     02/01/2025 0900 apixaban (ELIQUIS) tablet 5 mg -- Oral Held Dose     02/01/2025 1800 apixaban (ELIQUIS) tablet 5 mg -- Oral Held Dose     01/29/2025 0950 furosemide (LASIX) tablet 40 mg 40 mg Oral Given     01/29/2025 0950 metoprolol succinate (TOPROL-XL) 24 hr tablet 50 mg 50 mg Oral Given     01/29/2025 0723 cefepime (MAXIPIME) IVPB (premix in dextrose) 2,000 mg 50 mL 2,000 mg Intravenous New Bag     01/29/2025 0950 acetaminophen (TYLENOL) tablet 650 mg 650 mg Oral Given            Scheduled Medications:  [Held by provider] apixaban, 5 mg, Oral, BID  cefepime, 2,000 mg, Intravenous, Q12H  furosemide, 40 mg, Oral, Daily  metoprolol succinate, 50 mg, Oral, Daily  tamsulosin, 0.4 mg, Oral, Daily With Dinner  vancomycin, 15 mg/kg, Intravenous, Q24H      Continuous IV Infusions:     PRN Meds:  acetaminophen, 650 mg, Oral, Q6H PRN - x 2 1/29      ED Triage Vitals   Temperature Pulse  Respirations Blood Pressure SpO2 Pain Score   01/28/25 1537 01/28/25 1537 01/28/25 1537 01/28/25 1537 01/28/25 1537 01/29/25 0830   98.2 °F (36.8 °C) 92 16 129/70 97 % 10 - Worst Possible Pain     Weight (last 2 days)       Date/Time Weight    01/29/25 1146 71.7 (158)            Vital Signs (last 3 days)       Date/Time Temp Pulse Resp BP MAP (mmHg) SpO2 O2 Device Patient Position - Orthostatic VS Soraya Coma Scale Score Pain    01/30/25 09:34:30 -- 63 -- 120/60 80 97 % -- -- -- --    01/30/25 0930 -- -- -- -- -- -- -- -- 15 No Pain    01/30/25 07:19:03 97.7 °F (36.5 °C) 69 -- 134/67 89 98 % -- Lying -- --    01/29/25 22:37:30 97.7 °F (36.5 °C) 68 -- 128/75 93 96 % None (Room air) Lying -- --    01/29/25 1938 -- -- -- -- -- -- -- -- -- 8    01/29/25 1930 -- -- -- -- -- -- None (Room air) -- 15 --    01/29/25 14:03:25 97.6 °F (36.4 °C) -- -- 108/52 71 -- -- Lying -- --    01/29/25 1152 -- -- -- -- -- -- -- -- -- No Pain    01/29/25 0950 -- -- -- -- -- -- -- -- -- 10 - Worst Possible Pain    01/29/25 0830 98.2 °F (36.8 °C) 88 16 159/78 109 97 % None (Room air) Lying -- 10 - Worst Possible Pain    01/29/25 0800 -- -- -- -- -- -- -- -- 15 --    [birth year]/1941 -- 79 17 155/72 103 98 % None (Room air) -- -- --    01/28/25 1901 -- -- -- -- -- -- -- -- 15 --    01/28/25 1830 -- 79 18 144/70 100 98 % None (Room air) -- -- --    01/28/25 1800 -- 92 -- 144/82 107 98 % -- -- -- --    01/28/25 1537 98.2 °F (36.8 °C) 92 16 129/70 -- 97 % None (Room air) Sitting -- --              Pertinent Labs/Diagnostic Test Results:   Radiology:  CT upper extremity w contrast right   Final Interpretation by Alon Zimmerman MD (01/28 2001)      Extensive diffuse subcutaneous edema throughout the right upper extremity. There is more focal fluid collecting along the extensor digitorum tendons of the third and fourth fingers. This either suggests a developing abscess and/or tenosynovitis. This    region is suboptimally assessed due to the  large field-of-view. If clinically indicated, further dedicated imaging of the left hand with gadolinium enhanced MRI should be considered.         Workstation performed: [id number]           Cardiology:  No orders to display     GI:  No orders to display           Results from last 7 days   Lab Units 01/29/25  0607 01/28/25  1758 01/27/25  1135   WBC Thousand/uL 7.33 11.13* 11.38*   HEMOGLOBIN g/dL 12.0 14.7 13.6   HEMATOCRIT % 35.6* 43.7 40.4   PLATELETS Thousands/uL 156 189 135*   TOTAL NEUT ABS Thousands/µL 5.81 9.49* 9.79*     Results from last 7 days   Lab Units 01/29/25  0607 01/28/25  1758 01/27/25  1335   SODIUM mmol/L 138 135 137   POTASSIUM mmol/L 3.5 3.7 3.7   CHLORIDE mmol/L 110* 104 108   CO2 mmol/L 22 24 24   ANION GAP mmol/L 6 7 5   BUN mg/dL 25 28* 20   CREATININE mg/dL 1.17 1.36* 1.14   EGFR ml/min/1.73sq m 57 47 59   CALCIUM mg/dL 8.0* 8.5 8.2*   MAGNESIUM mg/dL 2.0  --   --      Results from last 7 days   Lab Units 01/27/25  1335   AST U/L 13   ALT U/L 9   ALK PHOS U/L 44   TOTAL PROTEIN g/dL 5.1*   ALBUMIN g/dL 2.8*   TOTAL BILIRUBIN mg/dL 0.93     Results from last 7 days   Lab Units 01/29/25  0607 01/28/25  1758 01/27/25  1335   GLUCOSE RANDOM mg/dL 93 97 134     Results from last 7 days   Lab Units 01/27/25  1135   PROTIME seconds 14.9   INR  1.11   PTT seconds 35*     Results from last 7 days   Lab Units 01/27/25  1135   PROCALCITONIN ng/ml 0.21     Results from last 7 days   Lab Units 01/27/25  1135   LACTIC ACID mmol/L 0.9     Results from last 7 days   Lab Units 01/29/25  0202   CLARITY UA  Clear   COLOR UA  Yellow   SPEC GRAV UA  1.015   PH UA  6.5   GLUCOSE UA mg/dl Negative   KETONES UA mg/dl 10 (1+)*   BLOOD UA  Negative   PROTEIN UA mg/dl 30 (1+)*   NITRITE UA  Negative   BILIRUBIN UA  Negative   UROBILINOGEN UA (BE) mg/dl <2.0   LEUKOCYTES UA  Negative   WBC UA /hpf 10-20*   RBC UA /hpf None Seen   BACTERIA UA /hpf Occasional   EPITHELIAL CELLS WET PREP /hpf Occasional   MUCUS  THREADS  None Seen     Results from last 7 days   Lab Units 01/29/25  0202 01/28/25  2239 01/28/25  2232 01/27/25  1135   BLOOD CULTURE   --  No Growth at 24 hrs. No Growth at 24 hrs. No Growth at 48 hrs.  No Growth at 48 hrs.   URINE CULTURE  [id number] cfu/ml  --   --   --      Results from last 7 days   Lab Units 01/30/25  0307   VANCOMYCIN RM ug/mL 16.8     Past Medical History:   Diagnosis Date    A-fib (HCC)     Hypertension     Lyme disease     Sick sinus syndrome (HCC)      Present on Admission:   Hypertension   Atrial fibrillation (HCC)   Pacemaker      Admitting Diagnosis: Swelling [R60.9]  Cellulitis of right hand [L03.113]  Cellulitis of right upper extremity [L03.113]  Extensor tenosynovitis of finger [M65.949]  Age/Sex: 84 y.o. male    Network Utilization Review Department  ATTENTION: Please call with any questions or concerns to [phone] and carefully listen to the prompts so that you are directed to the right person. All voicemails are confidential.   For Discharge needs, contact Care Management DC Support Team at [phone] opt. [phone]  Send all requests for admission clinical reviews, approved or denied determinations and any other requests to dedicated fax number below belonging to the campus where the patient is receiving treatment. List of dedicated fax numbers for the Facilities:  FACILITY NAME UR FAX NUMBER   ADMISSION DENIALS (Administrative/Medical Necessity) [phone]   DISCHARGE SUPPORT TEAM (NETWORK) [phone]   PARENT CHILD HEALTH (Maternity/NICU/Pediatrics) [phone]   [facility] [phone]   [facility] [phone]   [facility] [phone]   [facility] [phone]   [facility] [phone]   [facility] [phone]   [facility] [phone]   [facility]  [phone]   [facility] [phone]   [facility] [phone]   [facility] [phone]   [facility] [phone]

## 2025-01-29 NOTE — ASSESSMENT & PLAN NOTE
Presented due to right hand swelling, pain and erythema  WBC 11.13.  Only SIRS criteria is tachycardia (92)  CT upper extremity with contrast right  Extensive diffuse subcutaneous edema throughout the right upper extremity. There is more focal fluid collecting along the extensor digitorum tendons of the third and fourth fingers. This either suggests a developing abscess and/or tenosynovitis. This region is suboptimally assessed due to the large field-of-view. If clinically indicated, further dedicated imaging of the left hand with gadolinium enhanced MRI should be considered.  ER discussed case with hand who recommended  Cefepime/vancomycin  Ortho consult  Manage pain

## 2025-01-29 NOTE — PLAN OF CARE
Problem: Potential for Falls  Goal: Patient will remain free of falls  Description: INTERVENTIONS:  - Educate patient/family on patient safety including physical limitations  - Instruct patient to call for assistance with activity   - Consult OT/PT to assist with strengthening/mobility   - Keep Call bell within reach  - Keep bed low and locked with side rails adjusted as appropriate  - Keep care items and personal belongings within reach  - Initiate and maintain comfort rounds  - Make Fall Risk Sign visible to staff  - Offer Toileting every 2 Hours, in advance of need  - Initiate/Maintain alarm  - Obtain necessary fall risk management equipment  - Apply yellow socks and bracelet for high fall risk patients  - Consider moving patient to room near nurses station  Outcome: Progressing     Problem: PAIN - ADULT  Goal: Verbalizes/displays adequate comfort level or baseline comfort level  Description: Interventions:  - Encourage patient to monitor pain and request assistance  - Assess pain using appropriate pain scale  - Administer analgesics based on type and severity of pain and evaluate response  - Implement non-pharmacological measures as appropriate and evaluate response  - Consider cultural and social influences on pain and pain management  - Notify physician/advanced practitioner if interventions unsuccessful or patient reports new pain  Outcome: Progressing     Problem: INFECTION - ADULT  Goal: Absence or prevention of progression during hospitalization  Description: INTERVENTIONS:  - Assess and monitor for signs and symptoms of infection  - Monitor lab/diagnostic results  - Monitor all insertion sites, i.e. indwelling lines, tubes, and drains  - Monitor endotracheal if appropriate and nasal secretions for changes in amount and color  - [place] appropriate cooling/warming therapies per order  - Administer medications as ordered  - Instruct and encourage patient and family to use good hand hygiene technique  -  Identify and instruct in appropriate isolation precautions for identified infection/condition  Outcome: Progressing     Problem: SAFETY ADULT  Goal: Maintain or return to baseline ADL function  Description: INTERVENTIONS:  -  Assess patient's ability to carry out ADLs; assess patient's baseline for ADL function and identify physical deficits which impact ability to perform ADLs (bathing, care of mouth/teeth, toileting, grooming, dressing, etc.)  - Assess/evaluate cause of self-care deficits   - Assess range of motion  - Assess patient's mobility; develop plan if impaired  - Assess patient's need for assistive devices and provide as appropriate  - Encourage maximum independence but intervene and supervise when necessary  - Involve family in performance of ADLs  - Assess for home care needs following discharge   - Consider OT consult to assist with ADL evaluation and planning for discharge  - Provide patient education as appropriate  Outcome: Progressing  Goal: Maintains/Returns to pre admission functional level  Description: INTERVENTIONS:  - Perform AM-PAC 6 Click Basic Mobility/ Daily Activity assessment daily.  - Set and communicate daily [id number] goal to care team and patient/family/caregiver.   - Collaborate with rehabilitation services on mobility goals if consulted  - Perform Range of Motion 3 times a day.  - Reposition patient every 3 hours.  - Dangle patient 3 times a day  - Stand patient 3 times a day  - Ambulate patient 3 times a day  - Out of bed to chair 3 times a day   - Out of bed for meals 3 times a day  - Out of bed for toileting  - Record patient progress and toleration of activity level   Outcome: Progressing     Problem: DISCHARGE PLANNING  Goal: Discharge to home or other facility with appropriate resources  Description: INTERVENTIONS:  - Identify barriers to discharge w/patient and caregiver  - Arrange for needed discharge resources and transportation as appropriate  - Identify discharge  learning needs (meds, wound care, etc.)  - Arrange for interpretive services to assist at discharge as needed  - Refer to Case Management Department for coordinating discharge planning if the patient needs post-hospital services based on physician/advanced practitioner order or complex needs related to functional status, cognitive ability, or social support system  Outcome: Progressing     Problem: Knowledge Deficit  Goal: Patient/family/caregiver demonstrates understanding of disease process, treatment plan, medications, and discharge instructions  Description: Complete learning assessment and assess knowledge base.  Interventions:  - Provide teaching at level of understanding  - Provide teaching via preferred learning methods  Outcome: Progressing     Problem: CARDIOVASCULAR - ADULT  Goal: Maintains optimal cardiac output and hemodynamic stability  Description: INTERVENTIONS:  - Monitor I/O, vital signs and rhythm  - Monitor for S/S and trends of decreased cardiac output  - Administer and titrate ordered vasoactive medications to optimize hemodynamic stability  - Assess quality of pulses, skin color and temperature  - Assess for signs of decreased coronary artery perfusion  - Instruct patient to report change in severity of symptoms  Outcome: Progressing  Goal: Absence of cardiac dysrhythmias or at baseline rhythm  Description: INTERVENTIONS:  - Continuous cardiac monitoring, vital signs, obtain 12 lead EKG if ordered  - Administer antiarrhythmic and heart rate control medications as ordered  - Monitor electrolytes and administer replacement therapy as ordered  Outcome: Progressing     Problem: METABOLIC, FLUID AND ELECTROLYTES - ADULT  Goal: Electrolytes maintained within normal limits  Description: INTERVENTIONS:  - Monitor labs and assess patient for signs and symptoms of electrolyte imbalances  - Administer electrolyte replacement as ordered  - Monitor response to electrolyte replacements, including repeat lab  results as appropriate  - Instruct patient on fluid and nutrition as appropriate  Outcome: Progressing  Goal: Fluid balance maintained  Description: INTERVENTIONS:  - Monitor labs   - Monitor I/O and WT  - Instruct patient on fluid and nutrition as appropriate  - Assess for signs & symptoms of volume excess or deficit  Outcome: Progressing  Goal: Glucose maintained within target range  Description: INTERVENTIONS:  - Monitor Blood Glucose as ordered  - Assess for signs and symptoms of hyperglycemia and hypoglycemia  - Administer ordered medications to maintain glucose within target range  - Assess nutritional intake and initiate nutrition service referral as needed  Outcome: Progressing     Problem: SKIN/TISSUE INTEGRITY - ADULT  Goal: Skin Integrity remains intact(Skin Breakdown Prevention)  Description: Assess:  -Perform [place] assessment  -Clean and moisturize skin  -Inspect skin when repositioning, toileting, and assisting with ADLS  -Assess under medical devices  -Assess extremities for adequate circulation and sensation     Bed Management:  -Have minimal linens on bed & keep smooth, unwrinkled  -Change linens as needed when moist or perspiring  -Avoid sitting or lying in one position for more than 2 hours while in bed  -Keep HOB at 30 degrees     Toileting:  -Offer bedside commode  -Assess for incontinence  -Use incontinent care products after each incontinent episode    Activity:  -Mobilize patient 3 times a day  -Encourage activity and walks on unit  -Encourage or provide ROM exercises   -Turn and reposition patient every 2 Hours  -Use appropriate equipment to lift or move patient in bed  -Instruct/ Assist with weight shifting when out of bed in chair  -Consider limitation of chair time 2 hour intervals    Skin Care:  -Avoid use of baby powder, tape, friction and shearing, hot water or constrictive clothing  -Relieve pressure over bony prominences  -Do not massage red bony areas    Next Steps:  -Teach patient  strategies to minimize risks   -Consider consults to  interdisciplinary teams  Outcome: Progressing  Goal: Incision(s), wounds(s) or drain site(s) healing without S/S of infection  Description: INTERVENTIONS  - Assess and document dressing, incision, wound bed, drain sites and surrounding tissue  - Provide patient and family education  - Perform skin care/dressing changes  Outcome: Progressing  Goal: Pressure injury heals and does not worsen  Description: Interventions:  - Implement low air loss mattress or specialty surface (Criteria met)  - Apply silicone foam dressing  - Instruct/assist with weight shifting every 120 minutes when in chair   - Limit chair time to 2 hour intervals  - Use special pressure reducing interventions when in chair   - Apply fecal or urinary incontinence containment device   - Perform passive or active ROM  - Turn and reposition patient & offload bony prominences every 2 hours   - Utilize friction reducing device or surface for transfers   - Consider consults to  interdisciplinary teams  - Use incontinent care products after each incontinent episode  - Consider nutrition services referral as needed  Outcome: Progressing     Problem: HEMATOLOGIC - ADULT  Goal: Maintains hematologic stability  Description: INTERVENTIONS  - Assess for signs and symptoms of bleeding or hemorrhage  - Monitor labs  - Administer supportive blood products/factors as ordered and appropriate  Outcome: Progressing

## 2025-01-29 NOTE — ED PROVIDER NOTES
Time reflects when diagnosis was documented in both MDM as applicable and the Disposition within this note       Time User Action Codes Description Comment    1/28/2025 10:25 PM Jim Jones Add [L03.113] Cellulitis of right hand     1/28/2025 10:26 PM Jim Jones Add [M65.949] Extensor tenosynovitis of finger           ED Disposition       ED Disposition   Admit    Condition   Stable    Date/Time   Tue Jan 28, 2025 10:25 PM    Comment   Case was discussed with Merle Terry and the patient's admission status was agreed to be Admission Status: inpatient status to the service of Dr. Swenson .               Assessment & Plan       Medical Decision Making  Pain, swelling, and redness to right arm extending from his forearm into his hand.  This appears most concentrated over the dorsum of his hand.  He has significant pain with flexion and extension of his wrist and in particular his third and fourth digits raising concern for an extensor tenosynovitis.  He has grossly intact cap refill and sensation distally.  He has no systemic symptoms at this time.  Seen yesterday in the emergency department, did ultimately AGAINST MEDICAL ADVICE.    Lab work appear reveals minimally elevated white blood cell count.  CT of the extremity shows diffuse soft tissue swelling consistent with clinically apparent cellulitis as well as tenosynovitis versus fluid collection to third/fourth digits.  Case discussed with Dr. Hernandez, on-call hand surgery.  Recommends admission overnight for Ortho consult in the morning.  Agreeable with current regimen of cefepime and vancomycin.  Patient agreeable to admission.    Amount and/or Complexity of Data Reviewed  Labs: ordered.  Radiology: ordered.    Risk  Prescription drug management.  Decision regarding hospitalization.             Medications   vancomycin (VANCOCIN) 1500 mg in sodium chloride 0.9% 250 mL IVPB (has no administration in time range)   cefepime (MAXIPIME) IVPB (premix in  "dextrose) 2,000 mg 50 mL (0 mg Intravenous Stopped 1/28/25 1900)   iohexol (OMNIPAQUE) 350 MG/ML injection (MULTI-DOSE) 100 mL (100 mL Intravenous Given 1/28/25 1908)   sodium chloride 0.9 % bolus 500 mL (500 mL Intravenous New Bag 1/28/25 2140)       ED Risk Strat Scores                                              History of Present Illness       Chief Complaint   Patient presents with    Hand Swelling     Pt coming from home for right hand swelling. Pt seen here yesterday for same and told to stay. Pt states \"I wasn't able to stay I told them I would come back. So here I am.\"       Past Medical History:   Diagnosis Date    A-fib (HCC)     Hypertension     Lyme disease       Past Surgical History:   Procedure Laterality Date    CARDIAC PACEMAKER PLACEMENT  10/2019      History reviewed. No pertinent family history.   Social History     Tobacco Use    Smoking status: Never    Smokeless tobacco: Never   Vaping Use    Vaping status: Never Used   Substance Use Topics    Alcohol use: Yes     Comment: socially    Drug use: Never      E-Cigarette/Vaping    E-Cigarette Use Never User       E-Cigarette/Vaping Substances      I have reviewed and agree with the history as documented.     Daniel is an 84-year-old male presenting with swelling, redness of right hand and forearm over the past several days.  He was evaluated in the emergency department yesterday, given IV antibiotics but ultimately left against medical advice as he had to care for his family member.  Denies fevers or chills at this time.  No specific injury or trauma to his hand noted apart from a small cut on his right thumb sustained several weeks ago which had since healed.  Of note, he was admitted in 11/24 for swelling and redness of his left arm which was ultimately found to be due to cellulitis.  He denies any known MRSA history.      History provided by:  Patient      Review of Systems   Constitutional:  Negative for chills and fever.   HENT:  Negative for " congestion, rhinorrhea and sore throat.    Eyes:  Negative for pain and visual disturbance.   Respiratory:  Negative for cough, shortness of breath and wheezing.    Cardiovascular:  Negative for chest pain and palpitations.   Gastrointestinal:  Negative for abdominal pain, nausea and vomiting.   Genitourinary:  Negative for dysuria, frequency and urgency.   Musculoskeletal:  Negative for back pain, neck pain and neck stiffness.   Skin:  Positive for rash. Negative for wound.   Neurological:  Negative for dizziness, weakness, light-headedness and numbness.           Objective       ED Triage Vitals [01/28/25 1537]   Temperature Pulse Blood Pressure Respirations SpO2 Patient Position - Orthostatic VS   98.2 °F (36.8 °C) 92 129/70 16 97 % Sitting      Temp Source Heart Rate Source BP Location FiO2 (%) Pain Score    Temporal Monitor Left arm -- --      Vitals      Date and Time Temp Pulse SpO2 Resp BP Pain Score FACES Pain Rating User   01/1940 -- 79 98 % 17 155/72 -- -- SH   01/28/25 1830 -- 79 98 % 18 144/70 -- -- AD   01/28/25 1800 -- 92 98 % -- 144/82 -- -- MB   01/28/25 1537 98.2 °F (36.8 °C) 92 97 % 16 129/70 -- -- CM            Physical Exam  Constitutional:       General: He is not in acute distress.     Appearance: He is well-developed. He is not diaphoretic.   HENT:      Head: Normocephalic and atraumatic.      Right Ear: External ear normal.      Left Ear: External ear normal.   Eyes:      Extraocular Movements:      Right eye: Normal extraocular motion.      Left eye: Normal extraocular motion.      Conjunctiva/sclera: Conjunctivae normal.      Pupils: Pupils are equal, round, and reactive to light.   Cardiovascular:      Rate and Rhythm: Normal rate and regular rhythm.   Pulmonary:      Effort: Pulmonary effort is normal. No accessory muscle usage or respiratory distress.   Abdominal:      General: Abdomen is flat. There is no distension.   Musculoskeletal:      Cervical back: Normal range of motion.  No rigidity.   Skin:     General: Skin is warm and dry.      Capillary Refill: Capillary refill takes less than 2 seconds.      Findings: Erythema present.      Comments: Significant swelling, erythema of right arm diffusely extending from forearm into hand.  He has more significant erythema and swelling over dorsum of his hand in particular.  He has pain with flexion-extension of his wrist and in particular flexion and extension of his third and fourth digits.  Grossly intact cap refill and sensation distally.  Normal range of motion of right shoulder and elbow.   Neurological:      Mental Status: He is alert and oriented to person, place, and time.      Motor: No abnormal muscle tone.      Coordination: Coordination normal.   Psychiatric:         Behavior: Behavior normal.         Thought Content: Thought content normal.         Judgment: Judgment normal.         Results Reviewed       Procedure Component Value Units Date/Time    Blood culture #1 [[id number]] Collected: 01/28/25 2239    Lab Status: No result Specimen: Blood from Arm, Left     Blood culture #2 [[id number]] Collected: 01/28/25 2232    Lab Status: No result Specimen: Blood from Arm, Left     Basic metabolic panel [[id number]]  (Abnormal) Collected: 01/28/25 1758    Lab Status: Final result Specimen: Blood from Arm, Left Updated: 01/28/25 1835     Sodium 135 mmol/L      Potassium 3.7 mmol/L      Chloride 104 mmol/L      CO2 24 mmol/L      ANION GAP 7 mmol/L      BUN 28 mg/dL      Creatinine 1.36 mg/dL      Glucose 97 mg/dL      Calcium 8.5 mg/dL      eGFR 47 ml/min/1.73sq m     Narrative:      National Kidney Disease Foundation guidelines for Chronic Kidney Disease (CKD):     Stage 1 with normal or high GFR (GFR > 90 mL/min/1.73 square meters)    Stage 2 Mild CKD (GFR = 60-89 mL/min/1.73 square meters)    Stage 3A Moderate CKD (GFR = 45-59 mL/min/1.73 square meters)    Stage 3B Moderate CKD (GFR = 30-44 mL/min/1.73 square meters)    Stage 4 Severe CKD  (GFR = 15-29 mL/min/1.73 square meters)    Stage 5 End Stage CKD (GFR <15 mL/min/1.73 square meters)  Note: GFR calculation is accurate only with a steady state creatinine    CBC and differential [[id number]]  (Abnormal) Collected: 01/28/25 1758    Lab Status: Final result Specimen: Blood from Arm, Left Updated: 01/28/25 1818     WBC 11.13 Thousand/uL      RBC 4.42 Million/uL      Hemoglobin 14.7 g/dL      Hematocrit 43.7 %      MCV 99 fL      MCH 33.3 pg      MCHC 33.6 g/dL      RDW 13.2 %      MPV 10.0 fL      Platelets 189 Thousands/uL      nRBC 0 /100 WBCs      Segmented % 86 %      Immature Grans % 1 %      Lymphocytes % 6 %      Monocytes % 7 %      Eosinophils Relative 0 %      Basophils Relative 0 %      Absolute Neutrophils 9.49 Thousands/µL      Absolute Immature Grans 0.09 Thousand/uL      Absolute Lymphocytes 0.71 Thousands/µL      Absolute Monocytes 0.82 Thousand/µL      Eosinophils Absolute 0.00 Thousand/µL      Basophils Absolute 0.02 Thousands/µL             CT upper extremity w contrast right   Final Interpretation by Alon Zimmerman MD (01/28 2001)      Extensive diffuse subcutaneous edema throughout the right upper extremity. There is more focal fluid collecting along the extensor digitorum tendons of the third and fourth fingers. This either suggests a developing abscess and/or tenosynovitis. This    region is suboptimally assessed due to the large field-of-view. If clinically indicated, further dedicated imaging of the left hand with gadolinium enhanced MRI should be considered.         Workstation performed: [id number]             Procedures    ED Medication and Procedure Management   Prior to Admission Medications   Prescriptions Last Dose Informant Patient Reported? Taking?   apixaban (Eliquis) 5 mg   No No   Sig: Take 1 tablet (5 mg total) by mouth 2 (two) times a day   cephalexin (KEFLEX) 500 mg capsule   No No   Sig: Take 1 capsule (500 mg total) by mouth every 6 (six) hours for 7 days    furosemide (LASIX) 40 mg tablet  Self Yes No   Sig: Take 40 mg by mouth daily   metoprolol succinate (TOPROL-XL) 50 mg 24 hr tablet   No No   Sig: Take 1 tablet (50 mg total) by mouth daily   sulfamethoxazole-trimethoprim (BACTRIM DS) 800-160 mg per tablet   No No   Sig: Take 1 tablet by mouth 2 (two) times a day for 10 days smx-tmp DS (BACTRIM) 800-160 mg tabs (1tab q12 D10)   tamsulosin (FLOMAX) 0.4 mg  Self Yes No   Sig: Take 0.4 mg by mouth daily with dinner      Facility-Administered Medications: None     Patient's Medications   Discharge Prescriptions    No medications on file     No discharge procedures on file.  ED SEPSIS DOCUMENTATION   Time reflects when diagnosis was documented in both MDM as applicable and the Disposition within this note       Time User Action Codes Description Comment    1/28/2025 10:25 PM Jim Jones [L03.113] Cellulitis of right hand     1/28/2025 10:26 PM Jim Jones [M65.949] Extensor tenosynovitis of finger                  Jim Jones PA-C  01/28/25 7834

## 2025-01-30 LAB
BACTERIA UR CULT: NORMAL
VANCOMYCIN SERPL-MCNC: 16.8 UG/ML (ref 10–20)

## 2025-01-30 PROCEDURE — 99232 SBSQ HOSP IP/OBS MODERATE 35: CPT | Performed by: HOSPITALIST

## 2025-01-30 PROCEDURE — 99232 SBSQ HOSP IP/OBS MODERATE 35: CPT | Performed by: ORTHOPAEDIC SURGERY

## 2025-01-30 PROCEDURE — 80202 ASSAY OF VANCOMYCIN: CPT | Performed by: INTERNAL MEDICINE

## 2025-01-30 RX ADMIN — CEFEPIME HYDROCHLORIDE 2000 MG: 2 INJECTION, SOLUTION INTRAVENOUS at 05:30

## 2025-01-30 RX ADMIN — FUROSEMIDE 40 MG: 40 TABLET ORAL at 09:30

## 2025-01-30 RX ADMIN — METOPROLOL SUCCINATE 50 MG: 50 TABLET, EXTENDED RELEASE ORAL at 09:31

## 2025-01-30 RX ADMIN — VANCOMYCIN HYDROCHLORIDE 1000 MG: 1 INJECTION, SOLUTION INTRAVENOUS at 18:50

## 2025-01-30 RX ADMIN — APIXABAN 5 MG: 5 TABLET, FILM COATED ORAL at 17:42

## 2025-01-30 RX ADMIN — CEFEPIME HYDROCHLORIDE 2000 MG: 2 INJECTION, SOLUTION INTRAVENOUS at 17:42

## 2025-01-30 RX ADMIN — APIXABAN 5 MG: 5 TABLET, FILM COATED ORAL at 09:31

## 2025-01-30 RX ADMIN — TAMSULOSIN HYDROCHLORIDE 0.4 MG: 0.4 CAPSULE ORAL at 17:42

## 2025-01-30 NOTE — PROGRESS NOTES
Progress Note - Orthopedics   Name: Daniel Martins 84 y.o. male I MRN: [id number]  Unit/Bed#: -01 I Date of Admission: 1/28/2025   Date of Service: 1/30/2025 I Hospital Day: 2    Assessment & Plan  Cellulitis of right upper extremity  - Right hand pain and swelling secondary to superficial dorsal hand abscess with cellulitis extending from the hand to the upper arm.  -Now 1 day status post abscess I&D at bedside with wound packing.  -There has been clinical improvement status post the I&D with improved range of motion and pain.  -CBC, BMP, lactic acid, procalcitonin, ESR reviewed.  His leukocytosis has resolved.  ESR is normal.  Lactic acid and procalcitonin are normal.  Low concern for any severe infection.  -No indication for operative intervention at this time.  He should continue to improve with IV antibiotics  -Start 3 times daily warm soapy soaks for 10 minutes each  - Continue IV antibiotics per SLIM  - Pain control per primary  - Medical management per primary  - Ortho will continue to follow closely to monitor improvement of abscess and cellulitis with IV antibiotics and I&D.        Subjective   84 y.o.male Since his abscess I&D he states that his pain has improved.  He still getting pain in the hand wrist and elbow but it is not as bad as it was.  He has better movement of his hand and wrist.  Denies fevers or chills.    Objective :  Temp:  [97.6 °F (36.4 °C)-98.2 °F (36.8 °C)] 97.7 °F (36.5 °C)  HR:  [68-88] 69  BP: (108-159)/(52-78) 134/67  Resp:  [16] 16  SpO2:  [96 %-98 %] 98 %  O2 Device: None (Room air)    Physical Exam  Musculoskeletal: right upper  Dressing with mild serosanguineous staining.  Removed, packing in place  Skin continued erythema extending from the dorsal hand up to the upper arm.  Appears stable compared to lying marked yesterday.   Tenderness over the dorsal hand but no tenderness proximally  No further fluctuance of the dorsal hand, no other areas of fluctuance noted  "throughout the upper extremity  No pain with wrist or finger range of motion.  Finger range of motion limited but he has full passive motion of the fingers.  His wrist ROM arc is 45 degrees  Warm well-perfused fingertips        Lab Results: I have reviewed the following results:  Recent Labs     01/27/25  1135 01/27/25  1335 01/28/25  1758 01/29/25  0607   WBC 11.38*  --  11.13* 7.33   HGB 13.6  --  14.7 12.0   HCT 40.4  --  43.7 35.6*   *  --  189 156   BUN  --  20 28* 25   CREATININE  --  1.14 1.36* 1.17   PTT 35*  --   --   --    INR 1.11  --   --   --      Blood Culture:    Lab Results   Component Value Date    BLOODCX No Growth at 24 hrs. 01/28/2025     Wound Culture: No results found for: \"WOUNDCULT\"  "

## 2025-01-30 NOTE — PLAN OF CARE
Problem: PAIN - ADULT  Goal: Verbalizes/displays adequate comfort level or baseline comfort level  Description: Interventions:  - Encourage patient to monitor pain and request assistance  - Assess pain using appropriate pain scale  - Administer analgesics based on type and severity of pain and evaluate response  - Implement non-pharmacological measures as appropriate and evaluate response  - Consider cultural and social influences on pain and pain management  - Notify physician/advanced practitioner if interventions unsuccessful or patient reports new pain  Outcome: Progressing     Problem: INFECTION - ADULT  Goal: Absence or prevention of progression during hospitalization  Description: INTERVENTIONS:  - Assess and monitor for signs and symptoms of infection  - Monitor lab/diagnostic results  - Monitor all insertion sites, i.e. indwelling lines, tubes, and drains  - Monitor endotracheal if appropriate and nasal secretions for changes in amount and color  - [place] appropriate cooling/warming therapies per order  - Administer medications as ordered  - Instruct and encourage patient and family to use good hand hygiene technique  - Identify and instruct in appropriate isolation precautions for identified infection/condition  Outcome: Progressing     Problem: SKIN/TISSUE INTEGRITY - ADULT  Goal: Skin Integrity remains intact(Skin Breakdown Prevention)  Description: Assess:  -Perform [place] assessment  -Clean and moisturize skin  -Inspect skin when repositioning, toileting, and assisting with ADLS  -Assess under medical devices  -Assess extremities for adequate circulation and sensation     Bed Management:  -Have minimal linens on bed & keep smooth, unwrinkled  -Change linens as needed when moist or perspiring  -Avoid sitting or lying in one position for more than 2 hours while in bed  -Keep HOB at 30 degrees     Toileting:  -Offer bedside commode  -Assess for incontinence  -Use incontinent care products after each  incontinent episode    Activity:  -Mobilize patient 3 times a day  -Encourage activity and walks on unit  -Encourage or provide ROM exercises   -Turn and reposition patient every 2 Hours  -Use appropriate equipment to lift or move patient in bed  -Instruct/ Assist with weight shifting when out of bed in chair  -Consider limitation of chair time 2 hour intervals    Skin Care:  -Avoid use of baby powder, tape, friction and shearing, hot water or constrictive clothing  -Relieve pressure over bony prominences  -Do not massage red bony areas    Next Steps:  -Teach patient strategies to minimize risks   -Consider consults to  interdisciplinary teams  Outcome: Progressing  Goal: Incision(s), wounds(s) or drain site(s) healing without S/S of infection  Description: INTERVENTIONS  - Assess and document dressing, incision, wound bed, drain sites and surrounding tissue  - Provide patient and family education  - Perform skin care/dressing changes  Outcome: Progressing  Goal: Pressure injury heals and does not worsen  Description: Interventions:  - Implement low air loss mattress or specialty surface (Criteria met)  - Apply silicone foam dressing  - Instruct/assist with weight shifting every 120 minutes when in chair   - Limit chair time to 2 hour intervals  - Use special pressure reducing interventions when in chair   - Apply fecal or urinary incontinence containment device   - Perform passive or active ROM  - Turn and reposition patient & offload bony prominences every 2 hours   - Utilize friction reducing device or surface for transfers   - Consider consults to  interdisciplinary teams  - Use incontinent care products after each incontinent episode  - Consider nutrition services referral as needed  Outcome: Progressing

## 2025-01-30 NOTE — CASE MANAGEMENT
Case Management Assessment & Discharge Planning Note    Patient name Daniel Martins  Location /-01 MRN [id number]  : 1940 Date 2025       Current Admission Date: 2025  Current Admission Diagnosis:Cellulitis of right upper extremity   Patient Active Problem List    Diagnosis Date Noted Date Diagnosed    Cellulitis of right upper extremity 2025     Cellulitis of left upper extremity 2024     Bilateral leg edema 2024     Aneurysm of ascending aorta without rupture (HCC) 2023     Atrial fibrillation (HCC) 2023     Sick sinus syndrome (HCC) 2023     Pacemaker 2023     Hypertension        LOS (days): 2  Geometric Mean LOS (GMLOS) (days): 3.1  Days to GMLOS:1.4     OBJECTIVE:    Risk of Unplanned Readmission Score: 12.17         Current admission status: Inpatient       Preferred Pharmacy:   [facility]/pharmacy [id number] [place] - [address] [place] [id number]  Phone: [phone] Fax: [phone]    Primary Care Provider: Bacilio Estrada DO    Primary Insurance: BL[employer] CR[employer] REP  Secondary Insurance:     ASSESSMENT:  Active Health Care Proxies    There are no active Health Care Proxies on file.       Advance Directives  Does patient have a Health Care POA?: Yes  Does patient have Advance Directives?: Yes  Advance Directives: Living will, Power of  for health care         Readmission Root Cause  30 Day Readmission: No    Patient Information  Admitted from:: Home  Mental Status: Alert  During Assessment patient was accompanied by: Not accompanied during assessment  Assessment information provided by:: Patient  Primary Caregiver: Self  Support Systems: Spouse/significant other, Family members  County of Residence: [place]  What [facility] do you live in?: [place]  Type of Current Residence: 2 [place] home  Upon entering residence, is there a bedroom on the main floor (no further steps)?: No  A bedroom is located on the  following floor levels of residence (select all that apply):: 2nd Floor  Upon entering residence, is there a bathroom on the main floor (no further steps)?: Yes  Number of steps to 2nd floor from main floor: One Flight  Living Arrangements: Lives w/ Spouse/significant other  Is patient a ?: No    Activities of Daily Living Prior to Admission  Functional Status: Independent  Completes ADLs independently?: Yes  Ambulates independently?: Yes  Does patient use assisted devices?: No  Does patient currently own DME?: No  Does patient have a history of Outpatient Therapy (PT/OT)?: No  Does the patient have a history of Short-Term Rehab?: No  Does patient have a history of HHC?: No  Does patient currently have HHC?: No         Patient Information Continued  Income Source: Pension/longterm  Does patient have prescription coverage?: Yes  Does patient receive dialysis treatments?: No  Does patient have a history of substance abuse?: No  Does patient have a history of Mental Health Diagnosis?: No         Means of Transportation  Means of Transport to Hospitals in [place]:: Drives Self          DISCHARGE DETAILS:    Discharge planning discussed with:: patient  Freedom of Choice: Yes     CM contacted family/caregiver?: No- see comments (patint alert and in contact with his wife)  Were Treatment Team discharge recommendations reviewed with patient/caregiver?: Yes  Did patient/caregiver verbalize understanding of patient care needs?: Yes  Were patient/caregiver advised of the risks associated with not following Treatment Team discharge recommendations?: Yes         Requested Home Health Care         Is the patient interested in HHC at discharge?: Yes  Home Health Discipline requested:: Nursing  HHA External Referral Reason (only applicable if external HHA name selected): Patient has established relationship with provider  Home Health Follow-Up Provider:: PCP  Home Health Services Needed:: Wound/Ostomy Care  Homebound Criteria Met::  Requires the Assistance of Another Person for Safe Ambulation or to Leave the Home  Supporting Clincal Findings:: Limited Endurance                   Treatment Team Recommendation: Home with Home Health Care  Discharge Destination Plan:: Home with Home Health Care         Met with patient to review the role of CM and needs patient may have prior to discharge.  Patient resides with his wife in a 2 story home with 0 CHATA. He goes upstair to sleep. He reports being independent ofd ADL's. He uses a assistive device. He denies OP PT/HHC/SNF rehab/D&A rehab and BHU admission. He is agreeable to [facility], referrals sent for SN to [facility] via [facility]. Wait availability and patient preference. His family will transport him home.                       IMM Given (Date):: 01/30/25  IMM Given to:: Patient

## 2025-01-30 NOTE — PROGRESS NOTES
Daniel Martins is a 84 y.o. male who is currently ordered Vancomycin IV with management by the Pharmacy Consult service.  Relevant clinical data and objective / subjective history reviewed.  Vancomycin Assessment:  Indication and Goal AUC/Trough: Soft tissue (goal -600, trough >10), -600, trough >10  Clinical Status: stable  Micro:     Renal Function:  SCr: 1.17 mg/dL (from 1/29/25; no new Scr lab available from today)  CrCl: 48.1 mL/min  Renal replacement: Not on dialysis  Days of Therapy: 3  Current Dose: 1000mg IV Q24H  Vancomycin Plan: Patient is currently on vancomycin 1000mg IV Q24h and most recent random vancomycin level drawn today with morning lab is 16.8 ug/ml )extrapolated trough ~ 9.7). Based on today's assessment will continue same dose as follows   New Dosing: No change  Estimated AUC: 447 mcg*hr/mL  Estimated Trough: 11.9 mcg/mL  Next Level: With AM labs at 0600 on 2/5/25  Renal Function Monitoring: Daily BMP and UOP  Pharmacy will continue to follow closely for s/sx of nephrotoxicity, infusion reactions and appropriateness of therapy.  BMP and CBC will be ordered per protocol. We will continue to follow the patient’s culture results and clinical progress daily.    Ros Bowie, Pharmacist

## 2025-01-30 NOTE — PROGRESS NOTES
Progress Note - Hospitalist   Name: Daniel Martins 84 y.o. male I MRN: [id number]  Unit/Bed#: -01 I Date of Admission: 1/28/2025   Date of Service: 1/30/2025 I Hospital Day: 2     Assessment & Plan  Cellulitis of right upper extremity  Presented due to right hand swelling, pain and erythema  WBC 11.13.  Only SIRS criteria is tachycardia (92)  CT upper extremity with contrast right  Extensive diffuse subcutaneous edema throughout the right upper extremity. There is more focal fluid collecting along the extensor digitorum tendons of the third and fourth fingers. This either suggests a developing abscess and/or tenosynovitis. This region is suboptimally assessed due to the large field-of-view. If clinically indicated, further dedicated imaging of the left hand with gadolinium enhanced MRI should be considered.  ER discussed case with hand who recommended  Cefepime/vancomycin  Ortho consult apppreciated -s/p I +D.   Manage pain  Await culture data.   Hypertension  Home regimen:  Metoprolol succinate 50 mg daily  Lasix 40 mg daily  Atrial fibrillation (HCC)  Home regimen:  Metoprolol succinate 50 mg daily  Eliquis 5 mg twice daily  Pacemaker  Pacemaker in place  History of sick sinus syndrome   VTE  Prophylaxis:   Pharmacologic: in place  Mechanical VTE Prophylaxis in Place: Yes    Patient Centered Rounds: I have performed bedside rounds with nursing staff today.    Discussions with Specialists or Other Care Team Provider: case management    Education and Discussions with Family / Patient: yes    Mobility:   Basic Mobility Inpatient Raw Score: 23  JH-HLM Goal: 7: Walk 25 feet or more  JH-HLM Achieved: 8: Walk 250 feet ot more      Current Length of Stay: 2 day(s)    Current Patient Status: Inpatient        Code Status: Level 1 - Full Code    Discharge Plan: Pt will require continued inpatient hospitalization.    Subjective:   Pt states that the arm continues to improve  Less pain  No fever      Patient is seen and  examined at bedside.  All other ROS are negative.    Objective:     Vitals:   Temp (24hrs), Av.7 °F (36.5 °C), Min:97.6 °F (36.4 °C), Max:97.7 °F (36.5 °C)    Temp:  [97.6 °F (36.4 °C)-97.7 °F (36.5 °C)] 97.7 °F (36.5 °C)  HR:  [63-69] 63  BP: (108-134)/(52-75) 120/60  SpO2:  [96 %-98 %] 97 %  Body mass index is 22.04 kg/m².     Input and Output Summary (last 24 hours):       Intake/Output Summary (Last 24 hours) at 2025 0915  Last data filed at 2025 0301  Gross per 24 hour   Intake 1170 ml   Output 200 ml   Net 970 ml       Physical Exam:       GEN: No acute distress, comfortable  HEEENT: No JVD, PERRLA, no scleral icterus  RESP: Lungs clear to auscultation bilaterally  CV: RRR, +s1/s2   ABD: SOFT NON TENDER, POSITIVE BOWEL SOUNDS, NO DISTENTION  PSYCH: CALM  NEURO: Mentation baseline, NO FOCAL DEFICITS  SKIN: NO RASH; RUE cellulitis improved, dressed  EXTREM: NO EDEMA    Additional Data:     Labs:    Results from last 7 days   Lab Units 25  0607   WBC Thousand/uL 7.33   HEMOGLOBIN g/dL 12.0   HEMATOCRIT % 35.6*   PLATELETS Thousands/uL 156   SEGS PCT % 80*   LYMPHO PCT % 9*   MONO PCT % 10   EOS PCT % 0     Results from last 7 days   Lab Units 25  0607 25  1758 25  1335   SODIUM mmol/L 138   < > 137   POTASSIUM mmol/L 3.5   < > 3.7   CHLORIDE mmol/L 110*   < > 108   CO2 mmol/L 22   < > 24   BUN mg/dL 25   < > 20   CREATININE mg/dL 1.17   < > 1.14   ANION GAP mmol/L 6   < > 5   CALCIUM mg/dL 8.0*   < > 8.2*   ALBUMIN g/dL  --   --  2.8*   TOTAL BILIRUBIN mg/dL  --   --  0.93   ALK PHOS U/L  --   --  44   ALT U/L  --   --  9   AST U/L  --   --  13   GLUCOSE RANDOM mg/dL 93   < > 134    < > = values in this interval not displayed.     Results from last 7 days   Lab Units 25  1135   INR  1.11             Results from last 7 days   Lab Units 25  1135   LACTIC ACID mmol/L 0.9   PROCALCITONIN ng/ml 0.21       Lines/Drains:  Invasive Devices       Peripheral Intravenous  Line  Duration             Peripheral IV 01/29/25 Dorsal (posterior);Left Forearm <1 day                    Telemetry:        * I Have Reviewed All Lab Data Listed Above.           Imaging:     Results for orders placed during the hospital encounter of 01/27/25    XR chest portable    Narrative  XR CHEST PORTABLE    INDICATION: septic labs.    COMPARISON: 10/11/2023    FINDINGS: Left chest wall pacemaker is present.    Clear lungs. No pneumothorax or pleural effusion.    Normal cardiomediastinal silhouette.    Bones are unremarkable for age.    Normal upper abdomen.    Impression  No acute cardiopulmonary disease.        Workstation performed: [id number]    No results found for this or any previous visit.      *I have reviewed all imaging reports listed above      Recent Cultures (last 7 days):     Results from last 7 days   Lab Units 01/29/25  0202 01/28/25  2239 01/28/25  2232 01/27/25  1135   BLOOD CULTURE   --  No Growth at 24 hrs. No Growth at 24 hrs. No Growth at 48 hrs.  No Growth at 48 hrs.   URINE CULTURE  [id number] cfu/ml  --   --   --        Last 24 Hours Medication List:   Current Facility-Administered Medications   Medication Dose Route Frequency Provider Last Rate    acetaminophen  650 mg Oral Q6H PRN Merle Terry PA-C      apixaban  5 mg Oral BID Logan Jaimes MD      cefepime  2,000 mg Intravenous Q12H Merle Terry PA-C 2,000 mg (01/30/25 0530)    furosemide  40 mg Oral Daily Merle Terry PA-C      lidocaine (PF)  20 mL Infiltration Once Avery Munguia MD      metoprolol succinate  50 mg Oral Daily Merle Terry PA-C      tamsulosin  0.4 mg Oral Daily With Dinner Merle Terry PA-C      vancomycin  15 mg/kg Intravenous Q24H Merle Terry PA-C 1,000 mg (01/29/25 1804)        Today, Patient Was Seen By: Logan Jaimes MD    ** Please Note: Dictation voice to text software may have been used in the creation of this document. **

## 2025-01-30 NOTE — ASSESSMENT & PLAN NOTE
Presented due to right hand swelling, pain and erythema  WBC 11.13.  Only SIRS criteria is tachycardia (92)  CT upper extremity with contrast right  Extensive diffuse subcutaneous edema throughout the right upper extremity. There is more focal fluid collecting along the extensor digitorum tendons of the third and fourth fingers. This either suggests a developing abscess and/or tenosynovitis. This region is suboptimally assessed due to the large field-of-view. If clinically indicated, further dedicated imaging of the left hand with gadolinium enhanced MRI should be considered.  ER discussed case with hand who recommended  Cefepime/vancomycin  Ortho consult apppreciated -s/p I +D.   Manage pain  Await culture data.

## 2025-01-30 NOTE — PLAN OF CARE
Problem: Potential for Falls  Goal: Patient will remain free of falls  Description: INTERVENTIONS:  - Educate patient/family on patient safety including physical limitations  - Instruct patient to call for assistance with activity   - Consult OT/PT to assist with strengthening/mobility   - Keep Call bell within reach  - Keep bed low and locked with side rails adjusted as appropriate  - Keep care items and personal belongings within reach  - Initiate and maintain comfort rounds  - Make Fall Risk Sign visible to staff  - Offer Toileting every 2 Hours, in advance of need  - Initiate/Maintain alarm  - Obtain necessary fall risk management equipment  - Apply yellow socks and bracelet for high fall risk patients  - Consider moving patient to room near nurses station  Outcome: Progressing     Problem: PAIN - ADULT  Goal: Verbalizes/displays adequate comfort level or baseline comfort level  Description: Interventions:  - Encourage patient to monitor pain and request assistance  - Assess pain using appropriate pain scale  - Administer analgesics based on type and severity of pain and evaluate response  - Implement non-pharmacological measures as appropriate and evaluate response  - Consider cultural and social influences on pain and pain management  - Notify physician/advanced practitioner if interventions unsuccessful or patient reports new pain  Outcome: Progressing     Problem: INFECTION - ADULT  Goal: Absence or prevention of progression during hospitalization  Description: INTERVENTIONS:  - Assess and monitor for signs and symptoms of infection  - Monitor lab/diagnostic results  - Monitor all insertion sites, i.e. indwelling lines, tubes, and drains  - Monitor endotracheal if appropriate and nasal secretions for changes in amount and color  - [place]acon appropriate cooling/warming therapies per order  - Administer medications as ordered  - Instruct and encourage patient and family to use good hand hygiene technique  -  Identify and instruct in appropriate isolation precautions for identified infection/condition  Outcome: Progressing     Problem: SAFETY ADULT  Goal: Maintain or return to baseline ADL function  Description: INTERVENTIONS:  -  Assess patient's ability to carry out ADLs; assess patient's baseline for ADL function and identify physical deficits which impact ability to perform ADLs (bathing, care of mouth/teeth, toileting, grooming, dressing, etc.)  - Assess/evaluate cause of self-care deficits   - Assess range of motion  - Assess patient's mobility; develop plan if impaired  - Assess patient's need for assistive devices and provide as appropriate  - Encourage maximum independence but intervene and supervise when necessary  - Involve family in performance of ADLs  - Assess for home care needs following discharge   - Consider OT consult to assist with ADL evaluation and planning for discharge  - Provide patient education as appropriate  Outcome: Progressing  Goal: Maintains/Returns to pre admission functional level  Description: INTERVENTIONS:  - Perform AM-PAC 6 Click Basic Mobility/ Daily Activity assessment daily.  - Set and communicate daily [id number] goal to care team and patient/family/caregiver.   - Collaborate with rehabilitation services on mobility goals if consulted  - Perform Range of Motion 3 times a day.  - Reposition patient every 3 hours.  - Dangle patient 3 times a day  - Stand patient 3 times a day  - Ambulate patient 3 times a day  - Out of bed to chair 3 times a day   - Out of bed for meals 3 times a day  - Out of bed for toileting  - Record patient progress and toleration of activity level   Outcome: Progressing     Problem: DISCHARGE PLANNING  Goal: Discharge to home or other facility with appropriate resources  Description: INTERVENTIONS:  - Identify barriers to discharge w/patient and caregiver  - Arrange for needed discharge resources and transportation as appropriate  - Identify discharge  learning needs (meds, wound care, etc.)  - Arrange for interpretive services to assist at discharge as needed  - Refer to Case Management Department for coordinating discharge planning if the patient needs post-hospital services based on physician/advanced practitioner order or complex needs related to functional status, cognitive ability, or social support system  Outcome: Progressing     Problem: Knowledge Deficit  Goal: Patient/family/caregiver demonstrates understanding of disease process, treatment plan, medications, and discharge instructions  Description: Complete learning assessment and assess knowledge base.  Interventions:  - Provide teaching at level of understanding  - Provide teaching via preferred learning methods  Outcome: Progressing     Problem: CARDIOVASCULAR - ADULT  Goal: Maintains optimal cardiac output and hemodynamic stability  Description: INTERVENTIONS:  - Monitor I/O, vital signs and rhythm  - Monitor for S/S and trends of decreased cardiac output  - Administer and titrate ordered vasoactive medications to optimize hemodynamic stability  - Assess quality of pulses, skin color and temperature  - Assess for signs of decreased coronary artery perfusion  - Instruct patient to report change in severity of symptoms  Outcome: Progressing  Goal: Absence of cardiac dysrhythmias or at baseline rhythm  Description: INTERVENTIONS:  - Continuous cardiac monitoring, vital signs, obtain 12 lead EKG if ordered  - Administer antiarrhythmic and heart rate control medications as ordered  - Monitor electrolytes and administer replacement therapy as ordered  Outcome: Progressing     Problem: METABOLIC, FLUID AND ELECTROLYTES - ADULT  Goal: Electrolytes maintained within normal limits  Description: INTERVENTIONS:  - Monitor labs and assess patient for signs and symptoms of electrolyte imbalances  - Administer electrolyte replacement as ordered  - Monitor response to electrolyte replacements, including repeat lab  results as appropriate  - Instruct patient on fluid and nutrition as appropriate  Outcome: Progressing  Goal: Fluid balance maintained  Description: INTERVENTIONS:  - Monitor labs   - Monitor I/O and WT  - Instruct patient on fluid and nutrition as appropriate  - Assess for signs & symptoms of volume excess or deficit  Outcome: Progressing  Goal: Glucose maintained within target range  Description: INTERVENTIONS:  - Monitor Blood Glucose as ordered  - Assess for signs and symptoms of hyperglycemia and hypoglycemia  - Administer ordered medications to maintain glucose within target range  - Assess nutritional intake and initiate nutrition service referral as needed  Outcome: Progressing     Problem: SKIN/TISSUE INTEGRITY - ADULT  Goal: Skin Integrity remains intact(Skin Breakdown Prevention)  Description: Assess:  -Perform [place] assessment  -Clean and moisturize skin  -Inspect skin when repositioning, toileting, and assisting with ADLS  -Assess under medical devices  -Assess extremities for adequate circulation and sensation     Bed Management:  -Have minimal linens on bed & keep smooth, unwrinkled  -Change linens as needed when moist or perspiring  -Avoid sitting or lying in one position for more than 2 hours while in bed  -Keep HOB at 30 degrees     Toileting:  -Offer bedside commode  -Assess for incontinence  -Use incontinent care products after each incontinent episode    Activity:  -Mobilize patient 3 times a day  -Encourage activity and walks on unit  -Encourage or provide ROM exercises   -Turn and reposition patient every 2 Hours  -Use appropriate equipment to lift or move patient in bed  -Instruct/ Assist with weight shifting when out of bed in chair  -Consider limitation of chair time 2 hour intervals    Skin Care:  -Avoid use of baby powder, tape, friction and shearing, hot water or constrictive clothing  -Relieve pressure over bony prominences  -Do not massage red bony areas    Next Steps:  -Teach patient  strategies to minimize risks   -Consider consults to  interdisciplinary teams  Outcome: Progressing  Goal: Incision(s), wounds(s) or drain site(s) healing without S/S of infection  Description: INTERVENTIONS  - Assess and document dressing, incision, wound bed, drain sites and surrounding tissue  - Provide patient and family education  - Perform skin care/dressing changes  Outcome: Progressing  Goal: Pressure injury heals and does not worsen  Description: Interventions:  - Implement low air loss mattress or specialty surface (Criteria met)  - Apply silicone foam dressing  - Instruct/assist with weight shifting every 120 minutes when in chair   - Limit chair time to 2 hour intervals  - Use special pressure reducing interventions when in chair   - Apply fecal or urinary incontinence containment device   - Perform passive or active ROM  - Turn and reposition patient & offload bony prominences every 2 hours   - Utilize friction reducing device or surface for transfers   - Consider consults to  interdisciplinary teams  - Use incontinent care products after each incontinent episode  - Consider nutrition services referral as needed  Outcome: Progressing     Problem: HEMATOLOGIC - ADULT  Goal: Maintains hematologic stability  Description: INTERVENTIONS  - Assess for signs and symptoms of bleeding or hemorrhage  - Monitor labs  - Administer supportive blood products/factors as ordered and appropriate  Outcome: Progressing

## 2025-01-30 NOTE — ASSESSMENT & PLAN NOTE
- Right hand pain and swelling secondary to superficial dorsal hand abscess with cellulitis extending from the hand to the upper arm.  -Now 1 day status post abscess I&D at bedside with wound packing.  -There has been clinical improvement status post the I&D with improved range of motion and pain.  -CBC, BMP, lactic acid, procalcitonin, ESR reviewed.  His leukocytosis has resolved.  ESR is normal.  Lactic acid and procalcitonin are normal.  Low concern for any severe infection.  -No indication for operative intervention at this time.  He should continue to improve with IV antibiotics  -Start 3 times daily warm soapy soaks for 10 minutes each  - Continue IV antibiotics per SLIM  - Pain control per primary  - Medical management per primary  - Ortho will continue to follow closely to monitor improvement of abscess and cellulitis with IV antibiotics and I&D.

## 2025-01-31 LAB — GLUCOSE SERPL-MCNC: 94 MG/DL (ref 65–140)

## 2025-01-31 PROCEDURE — 99231 SBSQ HOSP IP/OBS SF/LOW 25: CPT

## 2025-01-31 PROCEDURE — 99232 SBSQ HOSP IP/OBS MODERATE 35: CPT | Performed by: HOSPITALIST

## 2025-01-31 PROCEDURE — 82948 REAGENT STRIP/BLOOD GLUCOSE: CPT

## 2025-01-31 RX ORDER — TRAMADOL HYDROCHLORIDE 50 MG/1
50 TABLET ORAL EVERY 6 HOURS PRN
Status: DISCONTINUED | OUTPATIENT
Start: 2025-01-31 | End: 2025-02-01 | Stop reason: HOSPADM

## 2025-01-31 RX ADMIN — FUROSEMIDE 40 MG: 40 TABLET ORAL at 09:39

## 2025-01-31 RX ADMIN — METOPROLOL SUCCINATE 50 MG: 50 TABLET, EXTENDED RELEASE ORAL at 09:38

## 2025-01-31 RX ADMIN — CEFEPIME HYDROCHLORIDE 2000 MG: 2 INJECTION, SOLUTION INTRAVENOUS at 18:16

## 2025-01-31 RX ADMIN — APIXABAN 5 MG: 5 TABLET, FILM COATED ORAL at 09:39

## 2025-01-31 RX ADMIN — CEFEPIME HYDROCHLORIDE 2000 MG: 2 INJECTION, SOLUTION INTRAVENOUS at 05:39

## 2025-01-31 RX ADMIN — APIXABAN 5 MG: 5 TABLET, FILM COATED ORAL at 18:16

## 2025-01-31 RX ADMIN — VANCOMYCIN HYDROCHLORIDE 1000 MG: 1 INJECTION, SOLUTION INTRAVENOUS at 19:09

## 2025-01-31 RX ADMIN — TAMSULOSIN HYDROCHLORIDE 0.4 MG: 0.4 CAPSULE ORAL at 16:29

## 2025-01-31 NOTE — PROGRESS NOTES
Daniel Martins is a 84 y.o. male who is currently ordered Vancomycin IV with management by the Pharmacy Consult service.  Relevant clinical data and objective / subjective history reviewed.  Vancomycin Assessment:  Indication and Goal AUC/Trough: Soft tissue (goal -600, trough >10), -600, trough >10  Clinical Status: stable  Micro:     Renal Function:  SCr: 1.17 mg/dL  CrCl: 48.1 mL/min  Renal replacement: Not on dialysis  Days of Therapy: 4  Current Dose: 1000 mg q24h  Vancomycin Plan:  New Dosing: no change  Estimated AUC: 448 mcg*hr/mL  Estimated Trough: 12 mcg/mL  Next Level: 0600 on 02/05/25  Renal Function Monitoring: Daily BMP and UOP  Pharmacy will continue to follow closely for s/sx of nephrotoxicity, infusion reactions and appropriateness of therapy.  BMP and CBC will be ordered per protocol. We will continue to follow the patient’s culture results and clinical progress daily.    Latisha Montes De Oca, Pharmacist

## 2025-01-31 NOTE — PLAN OF CARE
Problem: Potential for Falls  Goal: Patient will remain free of falls  Description: INTERVENTIONS:  - Educate patient/family on patient safety including physical limitations  - Instruct patient to call for assistance with activity   - Consult OT/PT to assist with strengthening/mobility   - Keep Call bell within reach  - Keep bed low and locked with side rails adjusted as appropriate  - Keep care items and personal belongings within reach  - Initiate and maintain comfort rounds  - Make Fall Risk Sign visible to staff  - Offer Toileting every 2 Hours, in advance of need  - Initiate/Maintain alarm  - Obtain necessary fall risk management equipment  - Apply yellow socks and bracelet for high fall risk patients  - Consider moving patient to room near nurses station  Outcome: Progressing     Problem: PAIN - ADULT  Goal: Verbalizes/displays adequate comfort level or baseline comfort level  Description: Interventions:  - Encourage patient to monitor pain and request assistance  - Assess pain using appropriate pain scale  - Administer analgesics based on type and severity of pain and evaluate response  - Implement non-pharmacological measures as appropriate and evaluate response  - Consider cultural and social influences on pain and pain management  - Notify physician/advanced practitioner if interventions unsuccessful or patient reports new pain  Outcome: Progressing     Problem: INFECTION - ADULT  Goal: Absence or prevention of progression during hospitalization  Description: INTERVENTIONS:  - Assess and monitor for signs and symptoms of infection  - Monitor lab/diagnostic results  - Monitor all insertion sites, i.e. indwelling lines, tubes, and drains  - Monitor endotracheal if appropriate and nasal secretions for changes in amount and color  - [place] appropriate cooling/warming therapies per order  - Administer medications as ordered  - Instruct and encourage patient and family to use good hand hygiene technique  -  Identify and instruct in appropriate isolation precautions for identified infection/condition  Outcome: Progressing     Problem: SAFETY ADULT  Goal: Maintain or return to baseline ADL function  Description: INTERVENTIONS:  -  Assess patient's ability to carry out ADLs; assess patient's baseline for ADL function and identify physical deficits which impact ability to perform ADLs (bathing, care of mouth/teeth, toileting, grooming, dressing, etc.)  - Assess/evaluate cause of self-care deficits   - Assess range of motion  - Assess patient's mobility; develop plan if impaired  - Assess patient's need for assistive devices and provide as appropriate  - Encourage maximum independence but intervene and supervise when necessary  - Involve family in performance of ADLs  - Assess for home care needs following discharge   - Consider OT consult to assist with ADL evaluation and planning for discharge  - Provide patient education as appropriate  Outcome: Progressing  Goal: Maintains/Returns to pre admission functional level  Description: INTERVENTIONS:  - Perform AM-PAC 6 Click Basic Mobility/ Daily Activity assessment daily.  - Set and communicate daily [id number] goal to care team and patient/family/caregiver.   - Collaborate with rehabilitation services on mobility goals if consulted  - Perform Range of Motion 3 times a day.  - Reposition patient every 3 hours.  - Dangle patient 3 times a day  - Stand patient 3 times a day  - Ambulate patient 3 times a day  - Out of bed to chair 3 times a day   - Out of bed for meals 3 times a day  - Out of bed for toileting  - Record patient progress and toleration of activity level   Outcome: Progressing     Problem: DISCHARGE PLANNING  Goal: Discharge to home or other facility with appropriate resources  Description: INTERVENTIONS:  - Identify barriers to discharge w/patient and caregiver  - Arrange for needed discharge resources and transportation as appropriate  - Identify discharge  learning needs (meds, wound care, etc.)  - Arrange for interpretive services to assist at discharge as needed  - Refer to Case Management Department for coordinating discharge planning if the patient needs post-hospital services based on physician/advanced practitioner order or complex needs related to functional status, cognitive ability, or social support system  Outcome: Progressing     Problem: Knowledge Deficit  Goal: Patient/family/caregiver demonstrates understanding of disease process, treatment plan, medications, and discharge instructions  Description: Complete learning assessment and assess knowledge base.  Interventions:  - Provide teaching at level of understanding  - Provide teaching via preferred learning methods  Outcome: Progressing     Problem: CARDIOVASCULAR - ADULT  Goal: Maintains optimal cardiac output and hemodynamic stability  Description: INTERVENTIONS:  - Monitor I/O, vital signs and rhythm  - Monitor for S/S and trends of decreased cardiac output  - Administer and titrate ordered vasoactive medications to optimize hemodynamic stability  - Assess quality of pulses, skin color and temperature  - Assess for signs of decreased coronary artery perfusion  - Instruct patient to report change in severity of symptoms  Outcome: Progressing  Goal: Absence of cardiac dysrhythmias or at baseline rhythm  Description: INTERVENTIONS:  - Continuous cardiac monitoring, vital signs, obtain 12 lead EKG if ordered  - Administer antiarrhythmic and heart rate control medications as ordered  - Monitor electrolytes and administer replacement therapy as ordered  Outcome: Progressing     Problem: SKIN/TISSUE INTEGRITY - ADULT  Goal: Skin Integrity remains intact(Skin Breakdown Prevention)  Description: Assess:  -Perform [place] assessment  -Clean and moisturize skin  -Inspect skin when repositioning, toileting, and assisting with ADLS  -Assess under medical devices  -Assess extremities for adequate circulation and  sensation     Bed Management:  -Have minimal linens on bed & keep smooth, unwrinkled  -Change linens as needed when moist or perspiring  -Avoid sitting or lying in one position for more than 2 hours while in bed  -Keep HOB at 30 degrees     Toileting:  -Offer bedside commode  -Assess for incontinence  -Use incontinent care products after each incontinent episode    Activity:  -Mobilize patient 3 times a day  -Encourage activity and walks on unit  -Encourage or provide ROM exercises   -Turn and reposition patient every 2 Hours  -Use appropriate equipment to lift or move patient in bed  -Instruct/ Assist with weight shifting when out of bed in chair  -Consider limitation of chair time 2 hour intervals    Skin Care:  -Avoid use of baby powder, tape, friction and shearing, hot water or constrictive clothing  -Relieve pressure over bony prominences  -Do not massage red bony areas    Next Steps:  -Teach patient strategies to minimize risks   -Consider consults to  interdisciplinary teams  Outcome: Progressing  Goal: Incision(s), wounds(s) or drain site(s) healing without S/S of infection  Description: INTERVENTIONS  - Assess and document dressing, incision, wound bed, drain sites and surrounding tissue  - Provide patient and family education  - Perform skin care/dressing changes  Outcome: Progressing  Goal: Pressure injury heals and does not worsen  Description: Interventions:  - Implement low air loss mattress or specialty surface (Criteria met)  - Apply silicone foam dressing  - Instruct/assist with weight shifting every 120 minutes when in chair   - Limit chair time to 2 hour intervals  - Use special pressure reducing interventions when in chair   - Apply fecal or urinary incontinence containment device   - Perform passive or active ROM  - Turn and reposition patient & offload bony prominences every 2 hours   - Utilize friction reducing device or surface for transfers   - Consider consults to  interdisciplinary  teams  - Use incontinent care products after each incontinent episode  - Consider nutrition services referral as needed  Outcome: Progressing     Problem: HEMATOLOGIC - ADULT  Goal: Maintains hematologic stability  Description: INTERVENTIONS  - Assess for signs and symptoms of bleeding or hemorrhage  - Monitor labs  - Administer supportive blood products/factors as ordered and appropriate  Outcome: Progressing

## 2025-01-31 NOTE — PROGRESS NOTES
Progress Note - Hospitalist   Name: Daniel Martins 84 y.o. male I MRN: [id number]  Unit/Bed#: -01 I Date of Admission: 1/28/2025   Date of Service: 1/31/2025 I Hospital Day: 3     Assessment & Plan  Cellulitis of right upper extremity  Presented due to right hand swelling, pain and erythema  WBC 11.13.  Only SIRS criteria is tachycardia (92)  CT upper extremity with contrast right  Extensive diffuse subcutaneous edema throughout the right upper extremity. There is more focal fluid collecting along the extensor digitorum tendons of the third and fourth fingers. This either suggests a developing abscess and/or tenosynovitis. This region is suboptimally assessed due to the large field-of-view. If clinically indicated, further dedicated imaging of the left hand with gadolinium enhanced MRI should be considered.  ER discussed case with hand who recommended  Cefepime/vancomycin  Ortho consult apppreciated -s/p I +D.   Manage pain  Blood cx neg thus far. Improving with antibiotics.   Hypertension  Home regimen:  Metoprolol succinate 50 mg daily  Lasix 40 mg daily  Atrial fibrillation (HCC)  Home regimen:  Metoprolol succinate 50 mg daily  Eliquis 5 mg twice daily  Pacemaker  Pacemaker in place  History of sick sinus syndrome   VTE  Prophylaxis:   Pharmacologic: in place  Mechanical VTE Prophylaxis in Place: Yes    Patient Centered Rounds: I have performed bedside rounds with nursing staff today.    Discussions with Specialists or Other Care Team Provider: case management    Education and Discussions with Family / Patient: yes    Mobility:   Basic Mobility Inpatient Raw Score: 23  JH-HLM Goal: 7: Walk 25 feet or more  JH-HLM Achieved: 7: Walk 25 feet or more      Current Length of Stay: 3 day(s)    Current Patient Status: Inpatient        Code Status: Level 1 - Full Code    Discharge Plan: Pt will require continued inpatient hospitalization.    Subjective:       Pt with continued improvement of RUE cellulitis. Endorses  more pain    Patient is seen and examined at bedside.  All other ROS are negative.    Objective:     Vitals:   Temp (24hrs), Av °F (36.7 °C), Min:97.7 °F (36.5 °C), Max:98.1 °F (36.7 °C)    Temp:  [97.7 °F (36.5 °C)-98.1 °F (36.7 °C)] 98.1 °F (36.7 °C)  HR:  [57-71] 63  Resp:  [17-20] 20  BP: (134-150)/(73-80) 134/73  SpO2:  [94 %-98 %] 94 %  Body mass index is 22.04 kg/m².     Input and Output Summary (last 24 hours):       Intake/Output Summary (Last 24 hours) at 2025 0952  Last data filed at 2025 0908  Gross per 24 hour   Intake 1050 ml   Output --   Net 1050 ml       Physical Exam:       GEN: No acute distress, comfortable  HEEENT: No JVD, PERRLA, no scleral icterus  RESP: Lungs clear to auscultation bilaterally  CV: RRR, +s1/s2   ABD: SOFT NON TENDER, POSITIVE BOWEL SOUNDS, NO DISTENTION  PSYCH: CALM  NEURO: [place] baseline, NO FOCAL DEFICITS  SKIN: NO RASH  EXTREM: RUE dressed, improving cellulitis.     Additional Data:     Labs:    Results from last 7 days   Lab Units 25  0607   WBC Thousand/uL 7.33   HEMOGLOBIN g/dL 12.0   HEMATOCRIT % 35.6*   PLATELETS Thousands/uL 156   SEGS PCT % 80*   LYMPHO PCT % 9*   MONO PCT % 10   EOS PCT % 0     Results from last 7 days   Lab Units 25  0607 25  1758 25  1335   SODIUM mmol/L 138   < > 137   POTASSIUM mmol/L 3.5   < > 3.7   CHLORIDE mmol/L 110*   < > 108   CO2 mmol/L 22   < > 24   BUN mg/dL 25   < > 20   CREATININE mg/dL 1.17   < > 1.14   ANION GAP mmol/L 6   < > 5   CALCIUM mg/dL 8.0*   < > 8.2*   ALBUMIN g/dL  --   --  2.8*   TOTAL BILIRUBIN mg/dL  --   --  0.93   ALK PHOS U/L  --   --  44   ALT U/L  --   --  9   AST U/L  --   --  13   GLUCOSE RANDOM mg/dL 93   < > 134    < > = values in this interval not displayed.     Results from last 7 days   Lab Units 25  1135   INR  1.11             Results from last 7 days   Lab Units 25  1135   LACTIC ACID mmol/L 0.9   PROCALCITONIN ng/ml 0.21       Lines/Drains:  Invasive  Devices       Peripheral Intravenous Line  Duration             Peripheral IV 01/30/25 Dorsal (posterior);Left Forearm <1 day                    Telemetry:        * I Have Reviewed All Lab Data Listed Above.           Imaging:     Results for orders placed during the hospital encounter of 01/27/25    XR chest portable    Narrative  XR CHEST PORTABLE    INDICATION: septic labs.    COMPARISON: 10/11/2023    FINDINGS: Left chest wall pacemaker is present.    Clear lungs. No pneumothorax or pleural effusion.    Normal cardiomediastinal silhouette.    Bones are unremarkable for age.    Normal upper abdomen.    Impression  No acute cardiopulmonary disease.        Workstation performed: [id number]    No results found for this or any previous visit.      *I have reviewed all imaging reports listed above      Recent Cultures (last 7 days):     Results from last 7 days   Lab Units 01/29/25  0202 01/28/25  2239 01/28/25  2232 01/27/25  1135   BLOOD CULTURE   --  No Growth at 48 hrs. No Growth at 48 hrs. No Growth at 72 hrs.  No Growth at 72 hrs.   URINE CULTURE  [id number] cfu/ml  --   --   --        Last 24 Hours Medication List:   Current Facility-Administered Medications   Medication Dose Route Frequency Provider Last Rate    acetaminophen  650 mg Oral Q6H PRN Merle Terry PA-C      apixaban  5 mg Oral BID Logan Jaimes MD      cefepime  2,000 mg Intravenous Q12H Merle Terry PA-C Stopped (01/31/25 0639)    furosemide  40 mg Oral Daily Merle Terry PA-C      lidocaine (PF)  20 mL Infiltration Once Avery Munguia MD      metoprolol succinate  50 mg Oral Daily Merle Terry PA-C      tamsulosin  0.4 mg Oral Daily With Dinner Merle Terry PA-C      vancomycin  15 mg/kg Intravenous Q24H Merle Terry PA-C 1,000 mg (01/30/25 1850)        Today, Patient Was Seen By: Logan Jaimes MD    ** Please Note: Dictation voice to text software may have been used in the creation of this document. **

## 2025-01-31 NOTE — PROGRESS NOTES
Progress Note - Orthopedics   Name: Daniel Martins 84 y.o. male I MRN: [id number]  Unit/Bed#: -01 I Date of Admission: 1/28/2025   Date of Service: 1/31/2025 I Hospital Day: 3    Assessment & Plan  Cellulitis of right upper extremity  - Right hand pain and swelling secondary to superficial dorsal hand abscess with cellulitis extending from the hand to the upper arm.  -Now Day 2 status post abscess I&D at bedside with wound packing.  -There has been clinical improvement status post the I&D with improved range of motion and pain.  -CBC, BMP, lactic acid, procalcitonin, ESR reviewed.  His leukocytosis has resolved.  ESR is normal.  Lactic acid and procalcitonin are normal.  Low concern for any severe infection.  -No indication for operative intervention at this time.  He should continue to improve with IV antibiotics  -Start 3 times daily warm soapy soaks for 10 minutes each  - Continue IV antibiotics per SLIM  - Pain control per primary  - Medical management per primary  - Ortho will continue to follow closely to monitor improvement of abscess and cellulitis with IV antibiotics and I&D.  Patient is to follow up as outpatient with Dr. Field in 1-2 weeks             Subjective   84 y.o.male seen and examined at bedside, states that he noticed decreased swelling and less erythema. Notes that his pain is not significantly improved but slightly improve from yesterday No acute events, no new complaints. Pain well controlled. Denies fevers, chills, CP, SOB, N/V, numbness or tingling.   Objective :  Temp:  [97.7 °F (36.5 °C)-98.1 °F (36.7 °C)] 98.1 °F (36.7 °C)  HR:  [57-71] 63  BP: (120-150)/(60-80) 134/73  Resp:  [17-20] 20  SpO2:  [94 %-98 %] 94 %  O2 Device: None (Room air)    Physical Exam  Musculoskeletal: rightupper  Dressing with minimal discharge or stain  Skin continued erythema extending from the dorsal hand up to the upper arm.  Appears stable compared to yesterday  Tenderness over the dorsal hand but no  "tenderness proximally  No further fluctuance of the dorsal hand, no other areas of fluctuance noted throughout the upper extremity  No pain with wrist or finger range of motion.  Finger range of motion limited but he has full passive motion of the fingers.  His wrist ROM arc is 45 degrees  Warm well-perfused fingertips        Lab Results: I have reviewed the following results:  Recent Labs     01/28/25  1758 01/29/25  0607   WBC 11.13* 7.33   HGB 14.7 12.0   HCT 43.7 35.6*    156   BUN 28* 25   CREATININE 1.36* 1.17     Blood Culture:    Lab Results   Component Value Date    BLOODCX No Growth at 48 hrs. 01/28/2025     Wound Culture: No results found for: \"WOUNDCULT\"  "

## 2025-01-31 NOTE — ASSESSMENT & PLAN NOTE
- Right hand pain and swelling secondary to superficial dorsal hand abscess with cellulitis extending from the hand to the upper arm.  -Now Day 2 status post abscess I&D at bedside with wound packing.  -There has been clinical improvement status post the I&D with improved range of motion and pain.  -CBC, BMP, lactic acid, procalcitonin, ESR reviewed.  His leukocytosis has resolved.  ESR is normal.  Lactic acid and procalcitonin are normal.  Low concern for any severe infection.  -No indication for operative intervention at this time.  He should continue to improve with IV antibiotics  -Start 3 times daily warm soapy soaks for 10 minutes each  - Continue IV antibiotics per SLIM  - Pain control per primary  - Medical management per primary  - Ortho will continue to follow closely to monitor improvement of abscess and cellulitis with IV antibiotics and I&D.  Patient is to follow up as outpatient with Dr. Field in 1-2 weeks

## 2025-01-31 NOTE — ASSESSMENT & PLAN NOTE
Presented due to right hand swelling, pain and erythema  WBC 11.13.  Only SIRS criteria is tachycardia (92)  CT upper extremity with contrast right  Extensive diffuse subcutaneous edema throughout the right upper extremity. There is more focal fluid collecting along the extensor digitorum tendons of the third and fourth fingers. This either suggests a developing abscess and/or tenosynovitis. This region is suboptimally assessed due to the large field-of-view. If clinically indicated, further dedicated imaging of the left hand with gadolinium enhanced MRI should be considered.  ER discussed case with hand who recommended  Cefepime/vancomycin  Ortho consult apppreciated -s/p I +D.   Manage pain  Blood cx neg thus far. Improving with antibiotics.

## 2025-01-31 NOTE — PLAN OF CARE
Problem: Potential for Falls  Goal: Patient will remain free of falls  Description: INTERVENTIONS:  - Educate patient/family on patient safety including physical limitations  - Instruct patient to call for assistance with activity   - Consult OT/PT to assist with strengthening/mobility   - Keep Call bell within reach  - Keep bed low and locked with side rails adjusted as appropriate  - Keep care items and personal belongings within reach  - Initiate and maintain comfort rounds  - Make Fall Risk Sign visible to staff  - Offer Toileting every 2 Hours, in advance of need  - Initiate/Maintain alarm  - Obtain necessary fall risk management equipment  - Apply yellow socks and bracelet for high fall risk patients  - Consider moving patient to room near nurses station  Outcome: Progressing     Problem: PAIN - ADULT  Goal: Verbalizes/displays adequate comfort level or baseline comfort level  Description: Interventions:  - Encourage patient to monitor pain and request assistance  - Assess pain using appropriate pain scale  - Administer analgesics based on type and severity of pain and evaluate response  - Implement non-pharmacological measures as appropriate and evaluate response  - Consider cultural and social influences on pain and pain management  - Notify physician/advanced practitioner if interventions unsuccessful or patient reports new pain  Outcome: Progressing     Problem: INFECTION - ADULT  Goal: Absence or prevention of progression during hospitalization  Description: INTERVENTIONS:  - Assess and monitor for signs and symptoms of infection  - Monitor lab/diagnostic results  - Monitor all insertion sites, i.e. indwelling lines, tubes, and drains  - Monitor endotracheal if appropriate and nasal secretions for changes in amount and color  - [place] appropriate cooling/warming therapies per order  - Administer medications as ordered  - Instruct and encourage patient and family to use good hand hygiene technique  -  Identify and instruct in appropriate isolation precautions for identified infection/condition  Outcome: Progressing     Problem: SAFETY ADULT  Goal: Maintain or return to baseline ADL function  Description: INTERVENTIONS:  -  Assess patient's ability to carry out ADLs; assess patient's baseline for ADL function and identify physical deficits which impact ability to perform ADLs (bathing, care of mouth/teeth, toileting, grooming, dressing, etc.)  - Assess/evaluate cause of self-care deficits   - Assess range of motion  - Assess patient's mobility; develop plan if impaired  - Assess patient's need for assistive devices and provide as appropriate  - Encourage maximum independence but intervene and supervise when necessary  - Involve family in performance of ADLs  - Assess for home care needs following discharge   - Consider OT consult to assist with ADL evaluation and planning for discharge  - Provide patient education as appropriate  Outcome: Progressing  Goal: Maintains/Returns to pre admission functional level  Description: INTERVENTIONS:  - Perform AM-PAC 6 Click Basic Mobility/ Daily Activity assessment daily.  - Set and communicate daily [id number] goal to care team and patient/family/caregiver.   - Collaborate with rehabilitation services on mobility goals if consulted  - Perform Range of Motion 3 times a day.  - Reposition patient every 3 hours.  - Dangle patient 3 times a day  - Stand patient 3 times a day  - Ambulate patient 3 times a day  - Out of bed to chair 3 times a day   - Out of bed for meals 3 times a day  - Out of bed for toileting  - Record patient progress and toleration of activity level   Outcome: Progressing     Problem: DISCHARGE PLANNING  Goal: Discharge to home or other facility with appropriate resources  Description: INTERVENTIONS:  - Identify barriers to discharge w/patient and caregiver  - Arrange for needed discharge resources and transportation as appropriate  - Identify discharge  learning needs (meds, wound care, etc.)  - Arrange for interpretive services to assist at discharge as needed  - Refer to Case Management Department for coordinating discharge planning if the patient needs post-hospital services based on physician/advanced practitioner order or complex needs related to functional status, cognitive ability, or social support system  Outcome: Progressing     Problem: Knowledge Deficit  Goal: Patient/family/caregiver demonstrates understanding of disease process, treatment plan, medications, and discharge instructions  Description: Complete learning assessment and assess knowledge base.  Interventions:  - Provide teaching at level of understanding  - Provide teaching via preferred learning methods  Outcome: Progressing     Problem: CARDIOVASCULAR - ADULT  Goal: Maintains optimal cardiac output and hemodynamic stability  Description: INTERVENTIONS:  - Monitor I/O, vital signs and rhythm  - Monitor for S/S and trends of decreased cardiac output  - Administer and titrate ordered vasoactive medications to optimize hemodynamic stability  - Assess quality of pulses, skin color and temperature  - Assess for signs of decreased coronary artery perfusion  - Instruct patient to report change in severity of symptoms  Outcome: Progressing  Goal: Absence of cardiac dysrhythmias or at baseline rhythm  Description: INTERVENTIONS:  - Continuous cardiac monitoring, vital signs, obtain 12 lead EKG if ordered  - Administer antiarrhythmic and heart rate control medications as ordered  - Monitor electrolytes and administer replacement therapy as ordered  Outcome: Progressing

## 2025-01-31 NOTE — PROGRESS NOTES
Patient:  RACHEL ZAMARRIPA    MRN:  [id number]    Aidin Request ID:  [id number]    Level of care reserved:  Home Health Agency    Partner Reserved:  [facility], [place] [address] [phone]    Clinical needs requested:    Geography searched:  [id number]    Start of Service:    Request sent:  3:26pm EST on 1/30/2025 by Simona Varela    Partner reserved:  2:42pm EST on 1/31/2025 by Simona Varela    Choice list shared:  1:35pm EST on 1/31/2025 by Simona Varela

## 2025-01-31 NOTE — CASE MANAGEMENT
Case Management Discharge Planning Note    Patient name Daniel Martins  Location /-01 MRN [id number]  : 1940 Date 2025       Current Admission Date: 2025  Current Admission Diagnosis:Cellulitis of right upper extremity   Patient Active Problem List    Diagnosis Date Noted Date Diagnosed    Cellulitis of right upper extremity 2025     Cellulitis of left upper extremity 2024     Bilateral leg edema 2024     Aneurysm of ascending aorta without rupture (HCC) 2023     Atrial fibrillation (HCC) 2023     Sick sinus syndrome (HCC) 2023     Pacemaker 2023     Hypertension        LOS (days): 3  Geometric Mean LOS (GMLOS) (days): 3.1  Days to GMLOS:0.5     OBJECTIVE:  Risk of Unplanned Readmission Score: 12.49         Current admission status: Inpatient   Preferred Pharmacy:   [facility]/pharmacy #[id number] - [place], [place] - [address] [place] [address]  Phone: [phone] Fax: [phone]    Primary Care Provider: Bacilio Estrada DO    Primary Insurance: BLUE CROSS  REP  Secondary Insurance:     DISCHARGE DETAILS:        List of accepting [facility] agencies given to patient. His preference is [facility].  [facility] reserved on Aidin and their contact information added to patient's AVS. Patient may not have a ride home as none of his children live nearby. Lyft waiver signed. Will arrange Lyft ride at discharge if needed.

## 2025-02-01 VITALS
RESPIRATION RATE: 22 BRPM | HEIGHT: 71 IN | DIASTOLIC BLOOD PRESSURE: 85 MMHG | TEMPERATURE: 97.3 F | WEIGHT: 158 LBS | OXYGEN SATURATION: 97 % | HEART RATE: 80 BPM | SYSTOLIC BLOOD PRESSURE: 143 MMHG | BODY MASS INDEX: 22.12 KG/M2

## 2025-02-01 LAB
ALBUMIN SERPL BCG-MCNC: 2.5 G/DL (ref 3.5–5)
ALP SERPL-CCNC: 35 U/L (ref 34–104)
ALT SERPL W P-5'-P-CCNC: 10 U/L (ref 7–52)
ANION GAP SERPL CALCULATED.3IONS-SCNC: 5 MMOL/L (ref 4–13)
AST SERPL W P-5'-P-CCNC: 17 U/L (ref 13–39)
BACTERIA BLD CULT: NORMAL
BACTERIA BLD CULT: NORMAL
BASOPHILS # BLD AUTO: 0.03 THOUSANDS/ΜL (ref 0–0.1)
BASOPHILS NFR BLD AUTO: 1 % (ref 0–1)
BILIRUB SERPL-MCNC: 0.48 MG/DL (ref 0.2–1)
BUN SERPL-MCNC: 19 MG/DL (ref 5–25)
CALCIUM ALBUM COR SERPL-MCNC: 9.2 MG/DL (ref 8.3–10.1)
CALCIUM SERPL-MCNC: 8 MG/DL (ref 8.4–10.2)
CHLORIDE SERPL-SCNC: 107 MMOL/L (ref 96–108)
CO2 SERPL-SCNC: 24 MMOL/L (ref 21–32)
CREAT SERPL-MCNC: 1.06 MG/DL (ref 0.6–1.3)
EOSINOPHIL # BLD AUTO: 0.11 THOUSAND/ΜL (ref 0–0.61)
EOSINOPHIL NFR BLD AUTO: 2 % (ref 0–6)
ERYTHROCYTE [DISTWIDTH] IN BLOOD BY AUTOMATED COUNT: 13.1 % (ref 11.6–15.1)
GFR SERPL CREATININE-BSD FRML MDRD: 64 ML/MIN/1.73SQ M
GLUCOSE SERPL-MCNC: 107 MG/DL (ref 65–140)
HCT VFR BLD AUTO: 36.8 % (ref 36.5–49.3)
HGB BLD-MCNC: 12.6 G/DL (ref 12–17)
IMM GRANULOCYTES # BLD AUTO: 0.01 THOUSAND/UL (ref 0–0.2)
IMM GRANULOCYTES NFR BLD AUTO: 0 % (ref 0–2)
LYMPHOCYTES # BLD AUTO: 0.99 THOUSANDS/ΜL (ref 0.6–4.47)
LYMPHOCYTES NFR BLD AUTO: 19 % (ref 14–44)
MCH RBC QN AUTO: 33.5 PG (ref 26.8–34.3)
MCHC RBC AUTO-ENTMCNC: 34.2 G/DL (ref 31.4–37.4)
MCV RBC AUTO: 98 FL (ref 82–98)
MONOCYTES # BLD AUTO: 0.6 THOUSAND/ΜL (ref 0.17–1.22)
MONOCYTES NFR BLD AUTO: 11 % (ref 4–12)
NEUTROPHILS # BLD AUTO: 3.59 THOUSANDS/ΜL (ref 1.85–7.62)
NEUTS SEG NFR BLD AUTO: 67 % (ref 43–75)
NRBC BLD AUTO-RTO: 0 /100 WBCS
PLATELET # BLD AUTO: 176 THOUSANDS/UL (ref 149–390)
PMV BLD AUTO: 9.6 FL (ref 8.9–12.7)
POTASSIUM SERPL-SCNC: 3.6 MMOL/L (ref 3.5–5.3)
PROT SERPL-MCNC: 4.6 G/DL (ref 6.4–8.4)
RBC # BLD AUTO: 3.76 MILLION/UL (ref 3.88–5.62)
SODIUM SERPL-SCNC: 136 MMOL/L (ref 135–147)
WBC # BLD AUTO: 5.33 THOUSAND/UL (ref 4.31–10.16)

## 2025-02-01 PROCEDURE — 85025 COMPLETE CBC W/AUTO DIFF WBC: CPT | Performed by: HOSPITALIST

## 2025-02-01 PROCEDURE — 80053 COMPREHEN METABOLIC PANEL: CPT | Performed by: HOSPITALIST

## 2025-02-01 PROCEDURE — 99231 SBSQ HOSP IP/OBS SF/LOW 25: CPT | Performed by: ORTHOPAEDIC SURGERY

## 2025-02-01 PROCEDURE — 99239 HOSP IP/OBS DSCHRG MGMT >30: CPT | Performed by: HOSPITALIST

## 2025-02-01 RX ORDER — DOXYCYCLINE 100 MG/1
100 CAPSULE ORAL EVERY 12 HOURS SCHEDULED
Qty: 14 CAPSULE | Refills: 0 | Status: SHIPPED | OUTPATIENT
Start: 2025-02-01 | End: 2025-02-08

## 2025-02-01 RX ADMIN — FUROSEMIDE 40 MG: 40 TABLET ORAL at 09:38

## 2025-02-01 RX ADMIN — CEFEPIME HYDROCHLORIDE 2000 MG: 2 INJECTION, SOLUTION INTRAVENOUS at 05:32

## 2025-02-01 RX ADMIN — METOPROLOL SUCCINATE 50 MG: 50 TABLET, EXTENDED RELEASE ORAL at 09:38

## 2025-02-01 RX ADMIN — APIXABAN 5 MG: 5 TABLET, FILM COATED ORAL at 09:38

## 2025-02-01 NOTE — PROGRESS NOTES
Daniel Martins is a 84 y.o. male who is currently ordered Vancomycin IV with management by the Pharmacy Consult service.  Relevant clinical data and objective / subjective history reviewed.  Vancomycin Assessment:  Indication and Goal AUC/Trough: Soft tissue (goal -600, trough >10), -600, trough >10  Clinical Status: improving  Micro:     Renal Function:  SCr: 1.06 mg/dL  CrCl: 53.5 mL/min  Renal replacement: Not on dialysis  Days of Therapy: 5  Current Dose: 1000 mg q24h  Vancomycin Plan:  New Dosing: no change  Estimated AUC: 416 mcg*hr/mL  Estimated Trough: 10.7 mcg/mL  Next Level: 0600 on 2/5/25  Renal Function Monitoring: Daily BMP and UOP  Pharmacy will continue to follow closely for s/sx of nephrotoxicity, infusion reactions and appropriateness of therapy.  BMP and CBC will be ordered per protocol. We will continue to follow the patient’s culture results and clinical progress daily.    Shanika Felix, Pharmacist

## 2025-02-01 NOTE — DISCHARGE SUMMARY
Discharge Summary - Hospitalist   Name: Daniel Martins 84 y.o. male I MRN: [id number]  Unit/Bed#: -01 I Date of Admission: 1/28/2025   Date of Service: 2/1/2025 I Hospital Day: 4     Assessment & Plan  Cellulitis of right upper extremity  Presented due to right hand swelling, pain and erythema  WBC 11.13.  Only SIRS criteria is tachycardia (92)  CT upper extremity with contrast right  Extensive diffuse subcutaneous edema throughout the right upper extremity. There is more focal fluid collecting along the extensor digitorum tendons of the third and fourth fingers. This either suggests a developing abscess and/or tenosynovitis. This region is suboptimally assessed due to the large field-of-view. If clinically indicated, further dedicated imaging of the left hand with gadolinium enhanced MRI should be considered.  ER discussed case with hand who recommended  Cefepime/vancomycin  Ortho consult apppreciated -s/p I +D.   Manage pain  Blood cx neg thus far. Improving with antibiotics. Complete a course of abx at [place].    Hypertension  Home regimen:  Metoprolol succinate 50 mg daily  Lasix 40 mg daily  Atrial fibrillation (HCC)  Home regimen:  Metoprolol succinate 50 mg daily  Eliquis 5 mg twice daily  Pacemaker  Pacemaker in place  History of sick sinus syndrome    Hospital Course:     Daniel Martins is a 84 y.o. male patient who originally presented to the hospital on   Admission Orders (From admission, onward)       Ordered        01/28/25 2226  INPATIENT ADMISSION  Once                         due to R upper extrem cellulitis. Pt had steady improvement with IV abx and underwent I + D by ortho. Appearance is markedly improved at the time of release from hospital. Pt will complete a course of oral abx at [facility].     Please see above list of diagnoses and related plan for additional information.     Physical Exam:    GEN: No acute distress, comfortable  HEEENT: No JVD, PERRLA, no scleral icterus  RESP: Lungs clear to  auscultation bilaterally  CV: RRR, +s1/s2   ABD: SOFT NON TENDER, POSITIVE BOWEL SOUNDS, NO DISTENTION  PSYCH: CALM  NEURO: Mentation baseline, NO FOCAL DEFICITS  SKIN: NO RASH; RUE cellulitis.   EXTREM: NO EDEMA    CONSULTING PROVIDERS   IP CONSULT TO PHARMACY  IP CONSULT TO ORTHOPEDIC SURGERY  IP CONSULT TO CASE MANAGEMENT    PROCEDURES PERFORMED  * No surgery found *    RADIOLOGY RESULTS  CT upper extremity w contrast right  Result Date: 1/28/2025  Narrative: CT right upper extremity with IV contrast INDICATION: Right forearm/hand infection. COMPARISON: Radiographs 1/27/2025 TECHNIQUE: CT examination of the above was performed.  This examination, like all CT scans performed in the [facility], was performed utilizing techniques to minimize radiation dose exposure, including the use of iterative reconstruction  and automated exposure control software.  Multiplanar 2D reformatted images were created from the source data. IV Contrast: 100 mL of iohexol (OMNIPAQUE) Rad dose  1664.05 mGy-cm FINDINGS: OSSEOUS STRUCTURES:  No fracture, dislocation or destructive osseous lesion. Moderate enthesophyte arising from the medial humeral condyle. Moderate joint space narrowing and marginal spurring affecting the basal joint and metacarpophalangeal joint of the thumb. VISUALIZED MUSCULATURE:  Unremarkable. SOFT TISSUES: Extensive diffuse subcutaneous edema throughout the right upper extremity, extending from the level of the upper arm, to the dorsum of the hand. There is likely more focal fluid tracking along the dorsal/extensor tendons of the third and fourth digits, measuring approximately 3.4 x 1.1 cm. No soft tissue emphysema. OTHER PERTINENT FINDINGS:  None.     Impression: Extensive diffuse subcutaneous edema throughout the right upper extremity. There is more focal fluid collecting along the extensor digitorum tendons of the third and fourth fingers. This either suggests a developing abscess and/or  tenosynovitis. This region is suboptimally assessed due to the large field-of-view. If clinically indicated, further dedicated imaging of the left hand with gadolinium enhanced MRI should be considered. Workstation performed: [id number]     XR chest portable  Result Date: 1/27/2025  Narrative: XR CHEST PORTABLE INDICATION: septic labs. COMPARISON: 10/11/2023 FINDINGS: Left chest wall pacemaker is present. Clear lungs. No pneumothorax or pleural effusion. Normal cardiomediastinal silhouette. Bones are unremarkable for age. Normal upper abdomen.     Impression: No acute cardiopulmonary disease. Workstation performed: [id number]     XR wrist 3+ views RIGHT  Result Date: 1/27/2025  Narrative: XR WRIST 3+ VW RIGHT INDICATION: septic labs. COMPARISON: None FINDINGS: No acute fracture or dislocation. No significant degenerative changes. No lytic or blastic osseous lesion. Unremarkable soft tissues.     Impression: No acute osseous abnormality. Computerized Assisted Algorithm (CAA) may have been used to analyze all applicable images. Workstation performed: [id number]     XR forearm 2 views RIGHT  Result Date: 1/27/2025  Narrative: XR FOREARM 2 VW RIGHT INDICATION: septic labs. COMPARISON: None FINDINGS: No acute fracture or dislocation. No significant degenerative changes. No lytic or blastic osseous lesion. Unremarkable soft tissues.     Impression: No acute osseous abnormality. Computerized Assisted Algorithm (CAA) may have been used to analyze all applicable images. Workstation performed: [id number]       LABS  Results from last 7 days   Lab Units 02/01/25  0552 01/29/25  0607 01/28/25  1758 01/27/25  1135   WBC Thousand/uL 5.33 7.33 11.13* 11.38*   HEMOGLOBIN g/dL 12.6 12.0 14.7 13.6   HEMATOCRIT % 36.8 35.6* 43.7 40.4   MCV fL 98 99* 99* 100*   PLATELETS Thousands/uL 176 156 189 135*   INR   --   --   --  1.11     Results from last 7 days   Lab Units 02/01/25  0518 01/29/25  0607 01/28/25  1758 01/27/25  1335    SODIUM mmol/L 136 138 135 137   POTASSIUM mmol/L 3.6 3.5 3.7 3.7   CHLORIDE mmol/L 107 110* 104 108   CO2 mmol/L 24 22 24 24   BUN mg/dL 19 25 28* 20   CREATININE mg/dL 1.06 1.17 1.36* 1.14   CALCIUM mg/dL 8.0* 8.0* 8.5 8.2*   ALBUMIN g/dL 2.5*  --   --  2.8*   TOTAL BILIRUBIN mg/dL 0.48  --   --  0.93   ALK PHOS U/L 35  --   --  44   ALT U/L 10  --   --  9   AST U/L 17  --   --  13   EGFR ml/min/1.73sq m 64 57 47 59   GLUCOSE RANDOM mg/dL 107 93 97 134                  Results from last 7 days   Lab Units 01/31/25  2047   POC GLUCOSE mg/dl 94             Results from last 7 days   Lab Units 01/27/25  1135   LACTIC ACID mmol/L 0.9   PROCALCITONIN ng/ml 0.21           Cultures:   Results from last 7 days   Lab Units 01/29/25  0202   COLOR UA  Yellow   CLARITY UA  Clear   SPEC GRAV UA  1.015   PH UA  6.5   LEUKOCYTES UA  Negative   NITRITE UA  Negative   GLUCOSE UA mg/dl Negative   KETONES UA mg/dl 10 (1+)*   BILIRUBIN UA  Negative   BLOOD UA  Negative      Results from last 7 days   Lab Units 01/29/25  0202   RBC UA /hpf None Seen   WBC UA /hpf 10-20*   EPITHELIAL CELLS WET PREP /hpf Occasional   BACTERIA UA /hpf Occasional      Results from last 7 days   Lab Units 01/29/25  0202 01/28/25  2239 01/28/25  2232 01/27/25  1135   BLOOD CULTURE   --  No Growth at 72 hrs. No Growth at 72 hrs. No Growth After 4 Days.  No Growth After 4 Days.   URINE CULTURE  [id number] cfu/ml  --   --   --          Condition at Discharge:  good      Discharge instructions/Information to patient and family:   See after visit summary for information provided to patient and family.  The above hospital course, results, incidental findings, need for follow up was discussed in detail with the patient and/or family.    Provisions for Follow-Up Care:  See after visit summary for information related to follow-up care and any pertinent home health orders.      Disposition:     Home       Discharge Statement:  I spent 38 minutes discharging the  patient. This time was spent on the day of discharge. I had direct contact with the patient on the day of discharge. Greater than 50% of the total time was spent examining patient, answering all patient questions, arranging and discussing plan of care with patient as well as directly providing post-discharge instructions.  Additional time then spent on discharge activities.    Discharge Medications:  See after visit summary for reconciled discharge medications provided to patient and family.      ** Please Note: This note has been constructed using a voice recognition system **

## 2025-02-01 NOTE — PLAN OF CARE
Problem: Potential for Falls  Goal: Patient will remain free of falls  Description: INTERVENTIONS:  - Educate patient/family on patient safety including physical limitations  - Instruct patient to call for assistance with activity   - Consult OT/PT to assist with strengthening/mobility   - Keep Call bell within reach  - Keep bed low and locked with side rails adjusted as appropriate  - Keep care items and personal belongings within reach  - Initiate and maintain comfort rounds  - Make Fall Risk Sign visible to staff  - Offer Toileting every 2 Hours, in advance of need  - Initiate/Maintain alarm  - Obtain necessary fall risk management equipment  - Apply yellow socks and bracelet for high fall risk patients  - Consider moving patient to room near nurses station  Outcome: Progressing     Problem: PAIN - ADULT  Goal: Verbalizes/displays adequate comfort level or baseline comfort level  Description: Interventions:  - Encourage patient to monitor pain and request assistance  - Assess pain using appropriate pain scale  - Administer analgesics based on type and severity of pain and evaluate response  - Implement non-pharmacological measures as appropriate and evaluate response  - Consider cultural and social influences on pain and pain management  - Notify physician/advanced practitioner if interventions unsuccessful or patient reports new pain  Outcome: Progressing     Problem: INFECTION - ADULT  Goal: Absence or prevention of progression during hospitalization  Description: INTERVENTIONS:  - Assess and monitor for signs and symptoms of infection  - Monitor lab/diagnostic results  - Monitor all insertion sites, i.e. indwelling lines, tubes, and drains  - Monitor endotracheal if appropriate and nasal secretions for changes in amount and color  - [place] appropriate cooling/warming therapies per order  - Administer medications as ordered  - Instruct and encourage patient and family to use good hand hygiene technique  -  Identify and instruct in appropriate isolation precautions for identified infection/condition  Outcome: Progressing     Problem: SAFETY ADULT  Goal: Maintain or return to baseline ADL function  Description: INTERVENTIONS:  -  Assess patient's ability to carry out ADLs; assess patient's baseline for ADL function and identify physical deficits which impact ability to perform ADLs (bathing, care of mouth/teeth, toileting, grooming, dressing, etc.)  - Assess/evaluate cause of self-care deficits   - Assess range of motion  - Assess patient's mobility; develop plan if impaired  - Assess patient's need for assistive devices and provide as appropriate  - Encourage maximum independence but intervene and supervise when necessary  - Involve family in performance of ADLs  - Assess for home care needs following discharge   - Consider OT consult to assist with ADL evaluation and planning for discharge  - Provide patient education as appropriate  Outcome: Progressing     Problem: Knowledge Deficit  Goal: Patient/family/caregiver demonstrates understanding of disease process, treatment plan, medications, and discharge instructions  Description: Complete learning assessment and assess knowledge base.  Interventions:  - Provide teaching at level of understanding  - Provide teaching via preferred learning methods  Outcome: Progressing     Problem: SKIN/TISSUE INTEGRITY - ADULT  Goal: Skin Integrity remains intact(Skin Breakdown Prevention)  Description: Assess:  -Perform [place] assessment  -Clean and moisturize skin  -Inspect skin when repositioning, toileting, and assisting with ADLS  -Assess under medical devices  -Assess extremities for adequate circulation and sensation     Bed Management:  -Have minimal linens on bed & keep smooth, unwrinkled  -Change linens as needed when moist or perspiring  -Avoid sitting or lying in one position for more than 2 hours while in bed  -Keep HOB at 30 degrees     Toileting:  -Offer bedside  commode  -Assess for incontinence  -Use incontinent care products after each incontinent episode    Activity:  -Mobilize patient 3 times a day  -Encourage activity and walks on unit  -Encourage or provide ROM exercises   -Turn and reposition patient every 2 Hours  -Use appropriate equipment to lift or move patient in bed  -Instruct/ Assist with weight shifting when out of bed in chair  -Consider limitation of chair time 2 hour intervals    Skin Care:  -Avoid use of baby powder, tape, friction and shearing, hot water or constrictive clothing  -Relieve pressure over bony prominences  -Do not massage red bony areas    Next Steps:  -Teach patient strategies to minimize risks   -Consider consults to  interdisciplinary teams  Outcome: Progressing

## 2025-02-01 NOTE — ASSESSMENT & PLAN NOTE
- Right hand pain and swelling secondary to superficial dorsal hand abscess with cellulitis extending from the hand to the upper arm.  -Now Day 3 status post abscess I&D at bedside with wound packing.  -There has been clinical improvement status post the I&D with improved range of motion and pain.  -No indication for operative intervention at this time.  Stable for discharge.    -continue 3 times daily warm soapy soaks for 10 minutes each  - antibiotics per SLIM  - Pain control per primary  - Medical management per primary  - Patient is to follow up as outpatient with Dr. Field in 1-2 weeks

## 2025-02-01 NOTE — ASSESSMENT & PLAN NOTE
Presented due to right hand swelling, pain and erythema  WBC 11.13.  Only SIRS criteria is tachycardia (92)  CT upper extremity with contrast right  Extensive diffuse subcutaneous edema throughout the right upper extremity. There is more focal fluid collecting along the extensor digitorum tendons of the third and fourth fingers. This either suggests a developing abscess and/or tenosynovitis. This region is suboptimally assessed due to the large field-of-view. If clinically indicated, further dedicated imaging of the left hand with gadolinium enhanced MRI should be considered.  ER discussed case with hand who recommended  Cefepime/vancomycin  Ortho consult apppreciated -s/p I +D.   Manage pain  Blood cx neg thus far. Improving with antibiotics. Complete a course of abx at [place].

## 2025-02-01 NOTE — CASE MANAGEMENT
Case Management Discharge Planning Note    Patient name Daniel Martins  Location /-01 MRN [id number]  : 1940 Date 2025       Current Admission Date: 2025  Current Admission Diagnosis:Cellulitis of right upper extremity   Patient Active Problem List    Diagnosis Date Noted Date Diagnosed    Cellulitis of right upper extremity 2025     Cellulitis of left upper extremity 2024     Bilateral leg edema 2024     Aneurysm of ascending aorta without rupture (HCC) 2023     Atrial fibrillation (HCC) 2023     Sick sinus syndrome (HCC) 2023     Pacemaker 2023     Hypertension        LOS (days): 4  Geometric Mean LOS (GMLOS) (days): 3.1  Days to GMLOS:-0.4     OBJECTIVE:  Risk of Unplanned Readmission Score: 12.65         Current admission status: Inpatient   Preferred Pharmacy:   [facility]/pharmacy #[id number] - [facility] [place] [place] - [address] [place] [address]  Phone: [phone] Fax: [phone]    Primary Care Provider: Bacilio Estrada DO    Primary Insurance: BLUE CROSS  REP  Secondary Insurance:     DISCHARGE DETAILS:        Met with patient, he reports his wife and son in law will transport him home. He no longer needs a Lyft ride. [facility] to follow for wound care

## 2025-02-01 NOTE — DISCHARGE INSTR - AVS FIRST PAGE
Wound care L hand:   Continue warm soapy soaks left hand 3 times per day over next few days, then wean down to 2 times a day for a few days, then once a day for a few days.    Monitor wound.  Soaks should be for 10 minutes each time.   Perform dressing changes after soaks with xeroform, gauze, and kerlix.    Follow up with Dr. Field in 1-2 weeks.

## 2025-02-01 NOTE — PROGRESS NOTES
Progress Note - Orthopedics   Name: Daniel Martins 84 y.o. male I MRN: [id number]  Unit/Bed#: -01 I Date of Admission: 1/28/2025   Date of Service: 2/1/2025 I Hospital Day: 4     Assessment & Plan  Cellulitis of right upper extremity  - Right hand pain and swelling secondary to superficial dorsal hand abscess with cellulitis extending from the hand to the upper arm.  -Now Day 3 status post abscess I&D at bedside with wound packing.  -There has been clinical improvement status post the I&D with improved range of motion and pain.  -No indication for operative intervention at this time.  Stable for discharge.    -continue 3 times daily warm soapy soaks for 10 minutes each  - antibiotics per SLIM  - Pain control per primary  - Medical management per primary  - Patient is to follow up as outpatient with Dr. Field in 1-2 weeks         Ok for discharge from Orthopedics service perspective.    Subjective   84 y.o.male No acute events, no new complaints. Pain well controlled.  Patient states he is doing well and ready to go home.      Objective :  Temp:  [97.3 °F (36.3 °C)-97.9 °F (36.6 °C)] 97.3 °F (36.3 °C)  HR:  [75-80] 80  BP: (133-154)/(76-85) 143/85  Resp:  [22] 22  SpO2:  [89 %-98 %] 97 %  O2 Device: None (Room air)    Physical Exam  Musculoskeletal: right upper extremity  Sensation intact to median/radial/ulnar nerve distribution   Motor intact anterior interosseous nerve/posterior interosseous nerve/radial/ulnar nerve distributions  2+ radial pulse  Dorsal aspect of right hand with mild swelling, minimal discoloration, wound without active drainage.  Minimal drainage on dressing.      Lab Results: I have reviewed the following results:  Recent Labs     02/01/25  0518 02/01/25  0552   WBC  --  5.33   HGB  --  12.6   HCT  --  36.8   PLT  --  176   BUN 19  --    CREATININE 1.06  --      Blood Culture:    Lab Results   Component Value Date    BLOODCX No Growth at 72 hrs. 01/28/2025     Wound Culture: No results  "found for: \"WOUNDCULT\"  "

## 2025-02-01 NOTE — PLAN OF CARE
Problem: Potential for Falls  Goal: Patient will remain free of falls  Description: INTERVENTIONS:  - Educate patient/family on patient safety including physical limitations  - Instruct patient to call for assistance with activity   - Consult OT/PT to assist with strengthening/mobility   - Keep Call bell within reach  - Keep bed low and locked with side rails adjusted as appropriate  - Keep care items and personal belongings within reach  - Initiate and maintain comfort rounds  - Make Fall Risk Sign visible to staff  - Offer Toileting every 2 Hours, in advance of need  - Initiate/Maintain alarm  - Obtain necessary fall risk management equipment  - Apply yellow socks and bracelet for high fall risk patients  - Consider moving patient to room near nurses station  Outcome: Progressing     Problem: PAIN - ADULT  Goal: Verbalizes/displays adequate comfort level or baseline comfort level  Description: Interventions:  - Encourage patient to monitor pain and request assistance  - Assess pain using appropriate pain scale  - Administer analgesics based on type and severity of pain and evaluate response  - Implement non-pharmacological measures as appropriate and evaluate response  - Consider cultural and social influences on pain and pain management  - Notify physician/advanced practitioner if interventions unsuccessful or patient reports new pain  Outcome: Progressing     Problem: INFECTION - ADULT  Goal: Absence or prevention of progression during hospitalization  Description: INTERVENTIONS:  - Assess and monitor for signs and symptoms of infection  - Monitor lab/diagnostic results  - Monitor all insertion sites, i.e. indwelling lines, tubes, and drains  - Monitor endotracheal if appropriate and nasal secretions for changes in amount and color  - [place] appropriate cooling/warming therapies per order  - Administer medications as ordered  - Instruct and encourage patient and family to use good hand hygiene technique  -  Identify and instruct in appropriate isolation precautions for identified infection/condition  Outcome: Progressing     Problem: SAFETY ADULT  Goal: Maintain or return to baseline ADL function  Description: INTERVENTIONS:  -  Assess patient's ability to carry out ADLs; assess patient's baseline for ADL function and identify physical deficits which impact ability to perform ADLs (bathing, care of mouth/teeth, toileting, grooming, dressing, etc.)  - Assess/evaluate cause of self-care deficits   - Assess range of motion  - Assess patient's mobility; develop plan if impaired  - Assess patient's need for assistive devices and provide as appropriate  - Encourage maximum independence but intervene and supervise when necessary  - Involve family in performance of ADLs  - Assess for home care needs following discharge   - Consider OT consult to assist with ADL evaluation and planning for discharge  - Provide patient education as appropriate  Outcome: Progressing  Goal: Maintains/Returns to pre admission functional level  Description: INTERVENTIONS:  - Perform AM-PAC 6 Click Basic Mobility/ Daily Activity assessment daily.  - Set and communicate daily [id number] goal to care team and patient/family/caregiver.   - Collaborate with rehabilitation services on mobility goals if consulted  - Perform Range of Motion 3 times a day.  - Reposition patient every 3 hours.  - Dangle patient 3 times a day  - Stand patient 3 times a day  - Ambulate patient 3 times a day  - Out of bed to chair 3 times a day   - Out of bed for meals 3 times a day  - Out of bed for toileting  - Record patient progress and toleration of activity level   Outcome: Progressing     Problem: DISCHARGE PLANNING  Goal: Discharge to home or other facility with appropriate resources  Description: INTERVENTIONS:  - Identify barriers to discharge w/patient and caregiver  - Arrange for needed discharge resources and transportation as appropriate  - Identify discharge  learning needs (meds, wound care, etc.)  - Arrange for interpretive services to assist at discharge as needed  - Refer to Case Management Department for coordinating discharge planning if the patient needs post-hospital services based on physician/advanced practitioner order or complex needs related to functional status, cognitive ability, or social support system  Outcome: Progressing     Problem: Knowledge Deficit  Goal: Patient/family/caregiver demonstrates understanding of disease process, treatment plan, medications, and discharge instructions  Description: Complete learning assessment and assess knowledge base.  Interventions:  - Provide teaching at level of understanding  - Provide teaching via preferred learning methods  Outcome: Progressing     Problem: CARDIOVASCULAR - ADULT  Goal: Maintains optimal cardiac output and hemodynamic stability  Description: INTERVENTIONS:  - Monitor I/O, vital signs and rhythm  - Monitor for S/S and trends of decreased cardiac output  - Administer and titrate ordered vasoactive medications to optimize hemodynamic stability  - Assess quality of pulses, skin color and temperature  - Assess for signs of decreased coronary artery perfusion  - Instruct patient to report change in severity of symptoms  Outcome: Progressing  Goal: Absence of cardiac dysrhythmias or at baseline rhythm  Description: INTERVENTIONS:  - Continuous cardiac monitoring, vital signs, obtain 12 lead EKG if ordered  - Administer antiarrhythmic and heart rate control medications as ordered  - Monitor electrolytes and administer replacement therapy as ordered  Outcome: Progressing     Problem: METABOLIC, FLUID AND ELECTROLYTES - ADULT  Goal: Electrolytes maintained within normal limits  Description: INTERVENTIONS:  - Monitor labs and assess patient for signs and symptoms of electrolyte imbalances  - Administer electrolyte replacement as ordered  - Monitor response to electrolyte replacements, including repeat lab  results as appropriate  - Instruct patient on fluid and nutrition as appropriate  Outcome: Progressing  Goal: Fluid balance maintained  Description: INTERVENTIONS:  - Monitor labs   - Monitor I/O and WT  - Instruct patient on fluid and nutrition as appropriate  - Assess for signs & symptoms of volume excess or deficit  Outcome: Progressing  Goal: Glucose maintained within target range  Description: INTERVENTIONS:  - Monitor Blood Glucose as ordered  - Assess for signs and symptoms of hyperglycemia and hypoglycemia  - Administer ordered medications to maintain glucose within target range  - Assess nutritional intake and initiate nutrition service referral as needed  Outcome: Progressing     Problem: SKIN/TISSUE INTEGRITY - ADULT  Goal: Skin Integrity remains intact(Skin Breakdown Prevention)  Description: Assess:  -Perform [place] assessment  -Clean and moisturize skin  -Inspect skin when repositioning, toileting, and assisting with ADLS  -Assess under medical devices  -Assess extremities for adequate circulation and sensation     Bed Management:  -Have minimal linens on bed & keep smooth, unwrinkled  -Change linens as needed when moist or perspiring  -Avoid sitting or lying in one position for more than 2 hours while in bed  -Keep HOB at 30 degrees     Toileting:  -Offer bedside commode  -Assess for incontinence  -Use incontinent care products after each incontinent episode    Activity:  -Mobilize patient 3 times a day  -Encourage activity and walks on unit  -Encourage or provide ROM exercises   -Turn and reposition patient every 2 Hours  -Use appropriate equipment to lift or move patient in bed  -Instruct/ Assist with weight shifting when out of bed in chair  -Consider limitation of chair time 2 hour intervals    Skin Care:  -Avoid use of baby powder, tape, friction and shearing, hot water or constrictive clothing  -Relieve pressure over bony prominences  -Do not massage red bony areas    Next Steps:  -Teach patient  strategies to minimize risks   -Consider consults to  interdisciplinary teams  Outcome: Progressing  Goal: Incision(s), wounds(s) or drain site(s) healing without S/S of infection  Description: INTERVENTIONS  - Assess and document dressing, incision, wound bed, drain sites and surrounding tissue  - Provide patient and family education  - Perform skin care/dressing changes  Outcome: Progressing  Goal: Pressure injury heals and does not worsen  Description: Interventions:  - Implement low air loss mattress or specialty surface (Criteria met)  - Apply silicone foam dressing  - Instruct/assist with weight shifting every 120 minutes when in chair   - Limit chair time to 2 hour intervals  - Use special pressure reducing interventions when in chair   - Apply fecal or urinary incontinence containment device   - Perform passive or active ROM  - Turn and reposition patient & offload bony prominences every 2 hours   - Utilize friction reducing device or surface for transfers   - Consider consults to  interdisciplinary teams  - Use incontinent care products after each incontinent episode  - Consider nutrition services referral as needed  Outcome: Progressing     Problem: HEMATOLOGIC - ADULT  Goal: Maintains hematologic stability  Description: INTERVENTIONS  - Assess for signs and symptoms of bleeding or hemorrhage  - Monitor labs  - Administer supportive blood products/factors as ordered and appropriate  Outcome: Progressing

## 2025-02-03 LAB
BACTERIA BLD CULT: NORMAL
BACTERIA BLD CULT: NORMAL

## 2025-02-03 NOTE — UTILIZATION REVIEW
NOTIFICATION OF ADMISSION DISCHARGE   This is a Notification of Discharge from [facility]. Please be advised that this patient has been discharge from our facility. Below you will find the admission and discharge date and time including the patient’s disposition.   UTILIZATION REVIEW CONTACT:  Neva Hurtado  Utilization   Network Utilization Review Department  Phone: [phone] x carefully listen to the prompts. All voicemails are confidential.  Email: [email or web address]     ADMISSION INFORMATION  PRESENTATION DATE: 1/28/2025  5:21 PM  OBERVATION ADMISSION DATE: N/A  INPATIENT ADMISSION DATE: 1/28/25 10:26 PM   DISCHARGE DATE: 2/1/2025 11:22 AM   DISPOSITION:Home/Self Care    Network Utilization Review Department  ATTENTION: Please call with any questions or concerns to [phone] and carefully listen to the prompts so that you are directed to the right person. All voicemails are confidential.   For Discharge needs, contact Care Management DC Support Team at [phone] opt. 2  Send all requests for admission clinical reviews, approved or denied determinations and any other requests to dedicated fax number below belonging to the campus where the patient is receiving treatment. List of dedicated fax numbers for the Facilities:  FACILITY NAME UR FAX NUMBER   ADMISSION DENIALS (Administrative/Medical Necessity) [phone]   DISCHARGE SUPPORT TEAM ([facility]) [phone]   PARENT CHILD HEALTH (Maternity/NICU/Pediatrics) [phone]   [facility] [phone]   [facility] [phone]   [facility] [phone]   [facility] [phone]   [facility] [phone]   [facility] [phone]   [facility] [phone]   [facility] [phone]    [facility] [phone]   [facility] [phone]   [facility] [phone]   [facility] [phone]

## 2025-04-02 ENCOUNTER — RESULTS FOLLOW-UP (OUTPATIENT)
Dept: CARDIOLOGY CLINIC | Facility: CLINIC | Age: 85
End: 2025-04-02

## 2025-04-02 ENCOUNTER — REMOTE DEVICE CLINIC VISIT (OUTPATIENT)
Dept: CARDIOLOGY CLINIC | Facility: CLINIC | Age: 85
End: 2025-04-02
Payer: COMMERCIAL

## 2025-04-02 DIAGNOSIS — Z95.0 CARDIAC PACEMAKER IN SITU: Primary | ICD-10-CM

## 2025-04-02 PROCEDURE — 93296 REM INTERROG EVL PM/IDS: CPT | Performed by: STUDENT IN AN ORGANIZED HEALTH CARE EDUCATION/TRAINING PROGRAM

## 2025-04-02 PROCEDURE — 93294 REM INTERROG EVL PM/LDLS PM: CPT | Performed by: STUDENT IN AN ORGANIZED HEALTH CARE EDUCATION/TRAINING PROGRAM

## 2025-04-02 NOTE — PROGRESS NOTES
MDT DC PPM (MVP ON) - ACTIVE SYSTEM IS MRI CONDITIONAL   CARELINK TRANSMISSION: BATTERY VOLTAGE ADEQUATE (7.3 YRS). AP 99.3%  <0.1% (AAIR-DDDR 60 PPM, -180 MS). ALL AVAILABLE LEAD PARAMETERS WITHIN NORMAL LIMITS. SINCE 12/30/24: NO SIGNIFICANT HIGH RATE EPISODES. AT/AF  [place] <0.1%. PATIENT TAKING ELIQUIS, METOPROLOL SUCC. NORMAL DEVICE FUNCTION.  ES

## 2025-05-28 ENCOUNTER — OFFICE VISIT (OUTPATIENT)
Dept: CARDIOLOGY CLINIC | Facility: CLINIC | Age: 85
End: 2025-05-28
Payer: COMMERCIAL

## 2025-05-28 VITALS
DIASTOLIC BLOOD PRESSURE: 66 MMHG | HEART RATE: 62 BPM | SYSTOLIC BLOOD PRESSURE: 108 MMHG | WEIGHT: 150 LBS | BODY MASS INDEX: 20.92 KG/M2

## 2025-05-28 DIAGNOSIS — I49.5 SICK SINUS SYNDROME (HCC): ICD-10-CM

## 2025-05-28 DIAGNOSIS — I48.0 PAROXYSMAL ATRIAL FIBRILLATION (HCC): Primary | ICD-10-CM

## 2025-05-28 DIAGNOSIS — I10 PRIMARY HYPERTENSION: ICD-10-CM

## 2025-05-28 DIAGNOSIS — I71.21 ANEURYSM OF ASCENDING AORTA WITHOUT RUPTURE (HCC): ICD-10-CM

## 2025-05-28 LAB
ATRIAL RATE: 62 BPM
P AXIS: 88 DEGREES
PR INTERVAL: 184 MS
QRS AXIS: 85 DEGREES
QRSD INTERVAL: 122 MS
QT INTERVAL: 424 MS
QTC INTERVAL: 430 MS
T WAVE AXIS: 38 DEGREES
VENTRICULAR RATE: 62 BPM

## 2025-05-28 PROCEDURE — 93000 ELECTROCARDIOGRAM COMPLETE: CPT | Performed by: INTERNAL MEDICINE

## 2025-05-28 PROCEDURE — 99214 OFFICE O/P EST MOD 30 MIN: CPT | Performed by: INTERNAL MEDICINE

## 2025-05-28 RX ORDER — FUROSEMIDE 40 MG/1
40 TABLET ORAL DAILY
Qty: 90 TABLET | Refills: 3 | Status: SHIPPED | OUTPATIENT
Start: 2025-05-28

## 2025-05-28 RX ORDER — METOPROLOL SUCCINATE 50 MG/1
50 TABLET, EXTENDED RELEASE ORAL DAILY
Qty: 90 TABLET | Refills: 3 | Status: SHIPPED | OUTPATIENT
Start: 2025-05-28

## 2025-05-28 NOTE — PROGRESS NOTES
Cardiology Outpatient Follow-Up Note - Daniel Martins 84 y.o. male MRN: [id number]      Assessment/Plan:    1. Paroxysmal atrial fibrillation (HCC)  Overview:  On rate control with metoprolol XL  Anticoagulated with Eliquis 5 mg BID    Assessment & Plan:  Device interrogation April 2025 - <0.1% AF burden  Continue current rx  Orders:  -     POCT ECG  -     apixaban (Eliquis) 5 mg; Take 1 tablet (5 mg total) by mouth 2 (two) times a day  -     metoprolol succinate (TOPROL-XL) 50 mg 24 hr tablet; Take 1 tablet (50 mg total) by mouth daily  2. Primary hypertension  Overview:  Maintained on metoprolol XL and furosemide  Assessment & Plan:  Controlled, no changes to therapy  Orders:  -     furosemide (LASIX) 40 mg tablet; Take 1 tablet (40 mg total) by mouth in the morning.  -     metoprolol succinate (TOPROL-XL) 50 mg 24 hr tablet; Take 1 tablet (50 mg total) by mouth daily  3. Sick sinus syndrome (HCC)  Overview:  Medtronic PPM implanted Oct 2019  Assessment & Plan:  Normal device function  4. Aneurysm of ascending aorta without rupture (HCC)  Overview:  CT on 10/11/23: 4.1 cm ascending aorta.  On anti-impulse therapy with beta-blocker.  Will need annual surveillance.  Assessment & Plan:  Pending repeat surveillance CT  Orders:  -     metoprolol succinate (TOPROL-XL) 50 mg 24 hr tablet; Take 1 tablet (50 mg total) by mouth daily      Stable on current therapy. No changes recommended today.   We will check CT chest for monitoring of TAA    We will see Daniel Martins back in 6 months for follow-up.    Subjective:     HPI: Daniel Martins is a 84 y.o. year old male with PAF, TAA, SSS s/p PPM, presenting for follow-up     LE edema has improved. He is on lasix with improvement.   Lightheadedness has resolved since we stopped lisinopril and amlodipine.     EP device report 4/1/25 - AP 99.3%, < 0.1%, battery 7.3 yrs, no HRE, AT/AF < 0.1%    Cardiac Testing:    Echo 6/5/24  Interpretation Summary  Show Result Comparison     Left  Ventricle: Left ventricular cavity size is normal. Wall thickness is mildly increased. There is mild concentric hypertrophy. The left ventricular ejection fraction is 55%. Systolic function is normal. Wall motion is normal. Diastolic function is normal.    IVS: There is abnormal septal motion consistent with right ventricular pacing.    Right Ventricle: A pacer wire is present.    Left Atrium: The atrium is mildly dilated.    Aortic Valve: There is trace regurgitation.    Mitral Valve: There is mild annular calcification. There is mild regurgitation.    Tricuspid Valve: There is mild regurgitation. The right ventricular systolic pressure is mildly elevated.    Pulmonic Valve: There is trace regurgitation.    Aorta: The aortic root is mildly dilated. The ascending aorta is mildly dilated. The aortic root is 3.90 cm. The ascending aorta is 3.9 cm.          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     1.6 cm left adrenal nodule, indeterminate possibly adenoma if patient has no known oncological history, can be characterized with the nonemergent noncontrast CT/CT with adrenal protocol     Mild nonspecific thickening of the jejunum probably due to redundant small bowel loops     Diffuse bladder wall thickening likely sequela of chronic bladder outlet obstruction         EKGs, personally reviewed:  5/28/25 - atrial paced, 62 bpm, NIVCD, abnormal study    Relevant Labs & Results:    CMP 10/11/23 - K 4.2, Cr 1.11  Lipid panel 7/31/23 - LDL 80 mg/dL   CBC 10/11/23 - Hgb 13.4 g/dL    CMP  "2/1/25 - K 3.6, Cr 1.06, Na 136  CBC 2/1/25 - Hgb 12.6 g/dL     ROS:  Review of Systems:  Review of Systems    Objective:     Vitals:   Vitals:    05/28/25 0911   BP: 108/66   BP Location: Right arm   Patient Position: Sitting   Cuff Size: Standard   Pulse: 62   Weight: 68 kg (150 lb)    Body surface area is 1.87 meters squared.  Wt Readings from Last 3 Encounters:   05/28/25 68 kg (150 lb)   01/29/25 71.7 kg (158 lb)   01/27/25 72.6 kg (160 lb)       Physical Exam:  General: Daniel Martins is a well appearing male, in no acute distress, sitting comfortably  HEENT: moist mucous membranes, EOMI  Neck:  No JVD, supple, trachea midline  Cardiovascular: unremarkable S1/S2, regular rate and rhythm, no murmurs, rubs or gallops  Pulmonary: normal respiratory effort, CTAB  Abdomen: soft and nondistended  Extremities: No lower extremity edema. Warm and well perfused extremities.  Neuro: no focal motor deficits, AAOx3 (person, place, time)  Psych: Normal mood and affect, cooperative        Medications (at the START of this encounter):  Outpatient Medications Prior to Visit   Medication Sig Dispense Refill    tamsulosin (FLOMAX) 0.4 mg Take 0.4 mg by mouth daily with dinner      apixaban (Eliquis) 5 mg Take 1 tablet (5 mg total) by mouth 2 (two) times a day 90 tablet 3    furosemide (LASIX) 40 mg tablet Take 40 mg by mouth in the morning.      metoprolol succinate (TOPROL-XL) 50 mg 24 hr tablet Take 1 tablet (50 mg total) by mouth daily 90 tablet 3     No facility-administered medications prior to visit.       This note was completed in part utilizing Dragon [employer] One voice recognition software. Grammatical errors, random word insertion, spelling mistakes, occasional wrong word or \"sound-alike\" substitutions and incomplete sentences may be an occasional consequence of the system secondary to software limitations, ambient noise and hardware issues. At the time of dictation, efforts were made to edit, clarify and /or correct " errors.  Please read the chart carefully and recognize, using context, where substitutions have occurred.  If you have any questions or concerns about the context, text or information contained within the body of this dictation, please contact myself, the provider, for further clarification.

## 2025-06-03 ENCOUNTER — HOSPITAL ENCOUNTER (OUTPATIENT)
Dept: CT IMAGING | Facility: HOSPITAL | Age: 85
Discharge: HOME | End: 2025-06-03
Attending: INTERNAL MEDICINE
Payer: COMMERCIAL

## 2025-06-03 DIAGNOSIS — I71.21 ANEURYSM OF ASCENDING AORTA WITHOUT RUPTURE (HCC): ICD-10-CM

## 2025-06-03 PROCEDURE — 71250 CT THORAX DX C-: CPT

## 2025-07-03 ENCOUNTER — REMOTE DEVICE CLINIC VISIT (OUTPATIENT)
Dept: CARDIOLOGY CLINIC | Facility: CLINIC | Age: 85
End: 2025-07-03
Payer: COMMERCIAL

## 2025-07-03 DIAGNOSIS — Z95.0 PRESENCE OF PERMANENT CARDIAC PACEMAKER: Primary | ICD-10-CM

## 2025-07-03 PROCEDURE — 93294 REM INTERROG EVL PM/LDLS PM: CPT | Performed by: STUDENT IN AN ORGANIZED HEALTH CARE EDUCATION/TRAINING PROGRAM

## 2025-07-03 PROCEDURE — 93296 REM INTERROG EVL PM/IDS: CPT | Performed by: STUDENT IN AN ORGANIZED HEALTH CARE EDUCATION/TRAINING PROGRAM

## 2025-07-03 NOTE — PROGRESS NOTES
Results for orders placed or performed in visit on 07/03/25   Cardiac EP device report    Narrative    MDT DC PPM (MVP ON) - ACTIVE SYSTEM IS MRI CONDITIONAL  CARELINK TRANSMISSION: BATTERY VOLTAGE ADEQUATE. (6.9 YRS) AP 98%  <1%. ALL AVAILABLE LEAD PARAMETERS WITHIN NORMAL LIMITS. 1 VHR EPISODE DETECTED, SVT NOTED. 29 AT/AF EPISODES DETECTED (0.5% OF TIME) LONGEST 3 HOURS. PVC COUNT 47.2/HOUR. PATIENT IS ON ELIQUIS AND METOPROLOL SUCC. NORMAL DEVICE FUNCTION.----VILLATORO